# Patient Record
Sex: FEMALE | Race: BLACK OR AFRICAN AMERICAN | NOT HISPANIC OR LATINO | URBAN - METROPOLITAN AREA
[De-identification: names, ages, dates, MRNs, and addresses within clinical notes are randomized per-mention and may not be internally consistent; named-entity substitution may affect disease eponyms.]

---

## 2022-11-30 ENCOUNTER — INPATIENT (INPATIENT)
Facility: HOSPITAL | Age: 77
LOS: 47 days | Discharge: HOME | DRG: 884 | End: 2023-01-17
Attending: PSYCHIATRY & NEUROLOGY | Admitting: PSYCHIATRY & NEUROLOGY
Payer: MEDICARE

## 2022-11-30 VITALS
WEIGHT: 119.05 LBS | RESPIRATION RATE: 18 BRPM | HEIGHT: 62 IN | DIASTOLIC BLOOD PRESSURE: 74 MMHG | OXYGEN SATURATION: 99 % | HEART RATE: 64 BPM | SYSTOLIC BLOOD PRESSURE: 160 MMHG | TEMPERATURE: 97 F

## 2022-11-30 LAB
ALBUMIN SERPL ELPH-MCNC: 4 G/DL — SIGNIFICANT CHANGE UP (ref 3.3–5)
ALP SERPL-CCNC: 64 U/L — SIGNIFICANT CHANGE UP (ref 40–120)
ALT FLD-CCNC: 25 U/L — SIGNIFICANT CHANGE UP (ref 10–45)
AMPHET UR-MCNC: NEGATIVE — SIGNIFICANT CHANGE UP
ANION GAP SERPL CALC-SCNC: 7 MMOL/L — SIGNIFICANT CHANGE UP (ref 5–17)
APAP SERPL-MCNC: <5 UG/ML — LOW (ref 10–30)
APPEARANCE UR: CLEAR — SIGNIFICANT CHANGE UP
AST SERPL-CCNC: 42 U/L — HIGH (ref 10–40)
BACTERIA # UR AUTO: PRESENT /HPF
BARBITURATES UR SCN-MCNC: NEGATIVE — SIGNIFICANT CHANGE UP
BASOPHILS # BLD AUTO: 0.03 K/UL — SIGNIFICANT CHANGE UP (ref 0–0.2)
BASOPHILS NFR BLD AUTO: 0.8 % — SIGNIFICANT CHANGE UP (ref 0–2)
BENZODIAZ UR-MCNC: NEGATIVE — SIGNIFICANT CHANGE UP
BILIRUB SERPL-MCNC: 0.8 MG/DL — SIGNIFICANT CHANGE UP (ref 0.2–1.2)
BILIRUB UR-MCNC: NEGATIVE — SIGNIFICANT CHANGE UP
BUN SERPL-MCNC: 13 MG/DL — SIGNIFICANT CHANGE UP (ref 7–23)
CALCIUM SERPL-MCNC: 9.2 MG/DL — SIGNIFICANT CHANGE UP (ref 8.4–10.5)
CHLORIDE SERPL-SCNC: 109 MMOL/L — HIGH (ref 96–108)
CO2 SERPL-SCNC: 28 MMOL/L — SIGNIFICANT CHANGE UP (ref 22–31)
COCAINE METAB.OTHER UR-MCNC: NEGATIVE — SIGNIFICANT CHANGE UP
COLOR SPEC: YELLOW — SIGNIFICANT CHANGE UP
COMMENT - URINE: SIGNIFICANT CHANGE UP
CREAT SERPL-MCNC: 1.05 MG/DL — SIGNIFICANT CHANGE UP (ref 0.5–1.3)
DIFF PNL FLD: NEGATIVE — SIGNIFICANT CHANGE UP
EGFR: 55 ML/MIN/1.73M2 — LOW
EOSINOPHIL # BLD AUTO: 0.03 K/UL — SIGNIFICANT CHANGE UP (ref 0–0.5)
EOSINOPHIL NFR BLD AUTO: 0.8 % — SIGNIFICANT CHANGE UP (ref 0–6)
EPI CELLS # UR: SIGNIFICANT CHANGE UP /HPF (ref 0–5)
ETHANOL SERPL-MCNC: <10 MG/DL — SIGNIFICANT CHANGE UP (ref 0–10)
GLUCOSE SERPL-MCNC: 72 MG/DL — SIGNIFICANT CHANGE UP (ref 70–99)
GLUCOSE UR QL: NEGATIVE — SIGNIFICANT CHANGE UP
HCT VFR BLD CALC: 34.8 % — SIGNIFICANT CHANGE UP (ref 34.5–45)
HGB BLD-MCNC: 11.1 G/DL — LOW (ref 11.5–15.5)
IMM GRANULOCYTES NFR BLD AUTO: 0.3 % — SIGNIFICANT CHANGE UP (ref 0–0.9)
KETONES UR-MCNC: ABNORMAL MG/DL
LEUKOCYTE ESTERASE UR-ACNC: ABNORMAL
LYMPHOCYTES # BLD AUTO: 1.39 K/UL — SIGNIFICANT CHANGE UP (ref 1–3.3)
LYMPHOCYTES # BLD AUTO: 38.4 % — SIGNIFICANT CHANGE UP (ref 13–44)
MCHC RBC-ENTMCNC: 28 PG — SIGNIFICANT CHANGE UP (ref 27–34)
MCHC RBC-ENTMCNC: 31.9 GM/DL — LOW (ref 32–36)
MCV RBC AUTO: 87.7 FL — SIGNIFICANT CHANGE UP (ref 80–100)
METHADONE UR-MCNC: NEGATIVE — SIGNIFICANT CHANGE UP
MONOCYTES # BLD AUTO: 0.4 K/UL — SIGNIFICANT CHANGE UP (ref 0–0.9)
MONOCYTES NFR BLD AUTO: 11 % — SIGNIFICANT CHANGE UP (ref 2–14)
NEUTROPHILS # BLD AUTO: 1.76 K/UL — LOW (ref 1.8–7.4)
NEUTROPHILS NFR BLD AUTO: 48.7 % — SIGNIFICANT CHANGE UP (ref 43–77)
NITRITE UR-MCNC: NEGATIVE — SIGNIFICANT CHANGE UP
NRBC # BLD: 0 /100 WBCS — SIGNIFICANT CHANGE UP (ref 0–0)
NT-PROBNP SERPL-SCNC: 162 PG/ML — SIGNIFICANT CHANGE UP (ref 0–300)
OPIATES UR-MCNC: NEGATIVE — SIGNIFICANT CHANGE UP
PCP SPEC-MCNC: SIGNIFICANT CHANGE UP
PCP UR-MCNC: NEGATIVE — SIGNIFICANT CHANGE UP
PH UR: 6 — SIGNIFICANT CHANGE UP (ref 5–8)
PLATELET # BLD AUTO: 220 K/UL — SIGNIFICANT CHANGE UP (ref 150–400)
POTASSIUM SERPL-MCNC: 3.3 MMOL/L — LOW (ref 3.5–5.3)
POTASSIUM SERPL-SCNC: 3.3 MMOL/L — LOW (ref 3.5–5.3)
PROT SERPL-MCNC: 6.4 G/DL — SIGNIFICANT CHANGE UP (ref 6–8.3)
PROT UR-MCNC: NEGATIVE MG/DL — SIGNIFICANT CHANGE UP
RBC # BLD: 3.97 M/UL — SIGNIFICANT CHANGE UP (ref 3.8–5.2)
RBC # FLD: 13.1 % — SIGNIFICANT CHANGE UP (ref 10.3–14.5)
RBC CASTS # UR COMP ASSIST: < 5 /HPF — SIGNIFICANT CHANGE UP
SALICYLATES SERPL-MCNC: <0.3 MG/DL — LOW (ref 2.8–20)
SARS-COV-2 RNA SPEC QL NAA+PROBE: NEGATIVE — SIGNIFICANT CHANGE UP
SODIUM SERPL-SCNC: 144 MMOL/L — SIGNIFICANT CHANGE UP (ref 135–145)
SP GR SPEC: >=1.03 — SIGNIFICANT CHANGE UP (ref 1–1.03)
THC UR QL: NEGATIVE — SIGNIFICANT CHANGE UP
TROPONIN T SERPL-MCNC: 0.01 NG/ML — SIGNIFICANT CHANGE UP (ref 0–0.01)
UROBILINOGEN FLD QL: 1 E.U./DL — SIGNIFICANT CHANGE UP
WBC # BLD: 3.62 K/UL — LOW (ref 3.8–10.5)
WBC # FLD AUTO: 3.62 K/UL — LOW (ref 3.8–10.5)
WBC UR QL: ABNORMAL /HPF

## 2022-11-30 PROCEDURE — 71046 X-RAY EXAM CHEST 2 VIEWS: CPT | Mod: 26

## 2022-11-30 PROCEDURE — 93010 ELECTROCARDIOGRAM REPORT: CPT

## 2022-11-30 PROCEDURE — 99285 EMERGENCY DEPT VISIT HI MDM: CPT | Mod: FS

## 2022-11-30 PROCEDURE — 90792 PSYCH DIAG EVAL W/MED SRVCS: CPT

## 2022-11-30 RX ORDER — TRAZODONE HCL 50 MG
50 TABLET ORAL AT BEDTIME
Refills: 0 | Status: DISCONTINUED | OUTPATIENT
Start: 2022-11-30 | End: 2023-01-17

## 2022-11-30 RX ORDER — OLANZAPINE 15 MG/1
2.5 TABLET, FILM COATED ORAL EVERY 8 HOURS
Refills: 0 | Status: DISCONTINUED | OUTPATIENT
Start: 2022-11-30 | End: 2023-01-17

## 2022-11-30 RX ORDER — ACETAMINOPHEN 500 MG
650 TABLET ORAL EVERY 6 HOURS
Refills: 0 | Status: DISCONTINUED | OUTPATIENT
Start: 2022-11-30 | End: 2023-01-17

## 2022-11-30 RX ORDER — MAGNESIUM HYDROXIDE 400 MG/1
30 TABLET, CHEWABLE ORAL DAILY
Refills: 0 | Status: DISCONTINUED | OUTPATIENT
Start: 2022-11-30 | End: 2023-01-17

## 2022-11-30 RX ORDER — RISPERIDONE 4 MG/1
0.5 TABLET ORAL AT BEDTIME
Refills: 0 | Status: DISCONTINUED | OUTPATIENT
Start: 2022-11-30 | End: 2022-12-04

## 2022-11-30 RX ORDER — POTASSIUM CHLORIDE 20 MEQ
40 PACKET (EA) ORAL ONCE
Refills: 0 | Status: COMPLETED | OUTPATIENT
Start: 2022-11-30 | End: 2022-11-30

## 2022-11-30 RX ADMIN — Medication 40 MILLIEQUIVALENT(S): at 06:45

## 2022-11-30 NOTE — ED ADULT NURSE NOTE - NSIMPLEMENTINTERV_GEN_ALL_ED
Implemented All Universal Safety Interventions:  [place] to call system. Call bell, personal items and telephone within reach. Instruct patient to call for assistance. Room bathroom lighting operational. Non-slip footwear when patient is off stretcher. Physically safe environment: no spills, clutter or unnecessary equipment. Stretcher in lowest position, wheels locked, appropriate side rails in place.

## 2022-11-30 NOTE — ED ADULT TRIAGE NOTE - ARRIVAL INFO ADDITIONAL COMMENTS
Pt to ED c/o intromittent chest pain x4 months. Pt has hx of Dementia. A&OX3 at this time. Daughter at bedside. Seen at [facility] yesterday for same complaint.

## 2022-11-30 NOTE — ED ADULT NURSE REASSESSMENT NOTE - NS ED NURSE REASSESS COMMENT FT1
After psych consult, patient placed on constant observation d/t involuntary admission. [facility]. Patient undressed, wanded, valuables secured. Patient maintained in a safe environment. Pt given safe food tray. After psych consult, patient placed on constant observation d/t involuntary admission. Pt pending [facility]. Patient undressed and valuables secured. Patient maintained in a safe environment. Pt given safe food tray.
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
## 2022-11-30 NOTE — BH PATIENT PROFILE - NSPROPTRIGHTCAREGIVER_GEN_A_NUR
January 10, 2020      Fabio Bruno MD  [address]  [address]  [facility] [address]           [facility], operated by [facility] [address]  [facility], [address] [address]  Phone: [phone]  Fax: [phone]          Patient: Kimi Cadena   MR Number: [id number]   YOB: 1956   Date of Visit: 1/10/2020       Dear Dr. Fabio Bruno:    Thank you for referring Kimi Cadena to me for evaluation. Attached you will find relevant portions of my assessment and plan of care.    If you have questions, please do not hesitate to call me. I look forward to following Kimi Cadena along with you.    Sincerely,    [employer], GAVIN    Enclosure  CC:  No Recipients    If you would like to receive this communication electronically, please contact [email or web address] or [phone] to request more information on [employer] access.    For providers and/or their staff who would like to refer a patient to [facility], please contact us through our one-stop-shop provider referral line, [facility], at [phone].    If you feel you have received this communication in error or would no longer like to receive these types of communications, please e-mail [email or web address]          declines

## 2022-11-30 NOTE — BH CONSULTATION LIAISON ASSESSMENT NOTE - DESCRIPTION
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 She has been staying with her daughter for the past 2 months after being d/c from [facility] psychiatric unit. Her  passed away ~4-5 years ago but sustains herself with financial compensation after his death. She has 2 daughters, oldest one lives in [place]. She believes that her oldest daughter is obsessed with her youngest daughter, since she can't have children and the youngest one does. She has 8 other siblings, 2 sisters have refused to take her in for care.

## 2022-11-30 NOTE — BH PATIENT PROFILE - FALL HARM RISK - UNIVERSAL INTERVENTIONS
Bed in lowest position, wheels locked, appropriate side rails in place/Call bell, personal items and telephone in reach/Instruct patient to call for assistance before getting out of bed or chair/Non-slip footwear when patient is out of bed/[place] to call system/Physically safe environment - no spills, clutter or unnecessary equipment/Purposeful Proactive Rounding/Room/bathroom lighting operational, light cord in reach

## 2022-11-30 NOTE — BH CONSULTATION LIAISON ASSESSMENT NOTE - NSBHATTESTCOMMENTATTENDFT_PSY_A_CORE
Patient is a 76 y/o woman, with no PMH and PPH of one prior psychiatric admission for paranoid delusions, 2 months ago at [facility], BIB by her daughter after missing from home for 3 days (was called by [facility] last night), for worsening paranoid delusions, irritability, agitation and abusing behavior.  Per pt's daughter, pt symptoms have been worsening since her discharge from inpatient psych (patient refused to take the risperidone prescribed). Upon assessment today, pt presents calm and cooperative, with disorganized thought process and delusions about being stalked and having her belongings stollen. Patient presents AAO to person, place (hospital), month, year, day of the week. She presents with normal registration and impaired recall at 3 and 5 min (2/3). Patient's delusional symptoms are currently causing significant impact in her ability to care for herself and she requires inpatient psychiatric admission for stabilization and safety.  Plan:  -patient would benefit from psychiatric admission, currently meeting criteria for involuntary admission  -restart risperidone at 0.5mg po qhs  - consult to facilitate access to inpatient [facility]

## 2022-11-30 NOTE — ED PROVIDER NOTE - PROGRESS NOTE DETAILS
kelsey -   mild hypokalemia to 3.3 - given oral repletion.  otherwise chest pain work-up unremarkable. ecg nonischemic, cxr ok, trop negative.   tox labs ok.   UA pending   medically cleared. psych consulted. to be seen by day psych  signed out to day team pending psych eval, pt ultimately will need admission as daughter does not feel safe caring for her at home. psych eval pt, for involuntary admit.  one to one order placed.  SW aware and will work on finding [facility] bed d/w psych and bed available on 8uris

## 2022-11-30 NOTE — ED ADULT NURSE NOTE - OBJECTIVE STATEMENT
77F/ ambulates to ED c/o on and off CP x 4 mos, accompanied by daughter . Phx of multiple visits  to [facility] ED for similar complaints as per patient. Patient denies sob, dizziness, n/v , fever.

## 2022-11-30 NOTE — ED PROVIDER NOTE - OBJECTIVE STATEMENT
77 yr old female, no medical hx, history of paranoid delusional psychosis, presents to the Emergency Department for multiple issues.   majority of history comes from daughter at bedside.   per daughter pt previously lived in [place]. recently had 2 month admission to geriatric psych reveles at [facility] for paranoid delusional psychosis. is supposed to take risperidone - was mostly refusing during admission and has refused to take is since being out of the hospital. since that admission pt now lives in [place] with daughter. has had intermittent issues w agitation / anger. frequently complains of chest pain and has been seen at multiple ERs for the chest pain with negative work-ups. pt left the home ?saturday morning and had been missing since that time. was found at [facility] yesterday. was evaluated there and deemed medically stable. tonight pt became agitated again and again complained of chest pain and wanted to go to the hospital.   daughter feels as though especially after recent episde of leaving the house on her own she can no longer take care of her properly at home. feels she needs to be admitted back for management of psychosis or go to a rehab facility. of note daughter reports pt is normally calm and cooperative at hospitals - seems to feel safe there. gets agitated and paranoid when at home.  pt current denies complaints. states she had chest pain earlier but it went away on its own.

## 2022-11-30 NOTE — ED PROVIDER NOTE - PHYSICAL EXAMINATION
Constitutional : Well appearing, non-toxic, no acute distress. calm and cooperative. awake, alert, oriented to person, place, time/situation.  Head : head normocephalic, atraumatic  EENMT : eyes clear bilaterally, PERRL, EOMI. airway patent. moist mucous membranes. neck supple.  Cardiac : Normal rate, regular rhythm. No murmur appreciated, no LE edema.  Resp : Breath sounds clear and equal bilaterally. Respirations even and unlabored.   Gastro : abdomen soft, nontender, nondistended. no rebound or guarding.  MSK :  range of motion is not limited, no muscle or joint tenderness  Back : No evidence of trauma. No spinal or CVA tenderness.  Vasc : Extremities warm and well perfused. 2+ radial and DP pulses. cap refill <2 seconds  Neuro : Alert and oriented, CNII-XII grossly intact, no focal deficits, no motor or sensory deficits.  Skin : Skin normal color for race, warm, dry and intact. No evidence of rash.  Psych : Alert and oriented to person, place, time/situation. normal mood and affect. no apparent risk to self or others.

## 2022-11-30 NOTE — ED PROVIDER NOTE - CLINICAL SUMMARY MEDICAL DECISION MAKING FREE TEXT BOX
pt w hx of paranoid delusional psychosis (not on meds). recent 2 month psych hospitalization. here w daughter. recently wandered from home and missing for two days. found yesterday, medically cleared at [facility] but daughter does not feel she can care for her at home anymore. pt wanted to come to hospital for chest pain - now resolved. per daughter ongoing x months. evaluated at multiple hospitals for same  pt well / nontoxic appearing, stable vitals. exam unremarkable. calm / cooperative answering questions appropriately   low suspicion acs given multiple work-ups in past.   will get labs, ecg, cxr. if negative cleared medically.   otherwise concern for safety at home and daughter doesn't feel she can care for her.  will consult psych given recent psych admission as symptoms dx paranoid delusional psychosis.   pt will ultimately need admission psych vs medicine for facility / rehab placement

## 2022-11-30 NOTE — ED PROVIDER NOTE - NS ED ATTENDING STATEMENT MOD
This was a shared visit with the JEREMY. I reviewed and verified the documentation and independently performed the documented:

## 2022-11-30 NOTE — BH CONSULTATION LIAISON ASSESSMENT NOTE - RISK ASSESSMENT
low risk of suicidality : patient does not have prior history of self harm, currently denies SI; she has a good support system, she demonstrates future oriented thinking.

## 2022-11-30 NOTE — BH CONSULTATION LIAISON ASSESSMENT NOTE - NSBHCHARTREVIEWVS_PSY_A_CORE FT
Vital Signs Last 24 Hrs  T(C): 36.6 (30 Nov 2022 06:45), Max: 36.6 (30 Nov 2022 06:45)  T(F): 97.9 (30 Nov 2022 06:45), Max: 97.9 (30 Nov 2022 06:45)  HR: 62 (30 Nov 2022 06:45) (62 - 64)  BP: 146/73 (30 Nov 2022 06:45) (146/73 - 160/74)  BP(mean): --  RR: 16 (30 Nov 2022 06:45) (16 - 18)  SpO2: 98% (30 Nov 2022 06:45) (98% - 99%)    Parameters below as of 30 Nov 2022 06:45  Patient On (Oxygen Delivery Method): room air

## 2022-12-01 LAB
A1C WITH ESTIMATED AVERAGE GLUCOSE RESULT: 5.4 % — SIGNIFICANT CHANGE UP (ref 4–5.6)
CHOLEST SERPL-MCNC: 168 MG/DL — SIGNIFICANT CHANGE UP
ESTIMATED AVERAGE GLUCOSE: 108 MG/DL — SIGNIFICANT CHANGE UP (ref 68–114)
HDLC SERPL-MCNC: 89 MG/DL — SIGNIFICANT CHANGE UP
LIPID PNL WITH DIRECT LDL SERPL: 73 MG/DL — SIGNIFICANT CHANGE UP
NON HDL CHOLESTEROL: 79 MG/DL — SIGNIFICANT CHANGE UP
TRIGL SERPL-MCNC: 29 MG/DL — SIGNIFICANT CHANGE UP

## 2022-12-01 PROCEDURE — 70450 CT HEAD/BRAIN W/O DYE: CPT | Mod: 26

## 2022-12-01 PROCEDURE — 99223 1ST HOSP IP/OBS HIGH 75: CPT

## 2022-12-01 RX ADMIN — RISPERIDONE 0.5 MILLIGRAM(S): 4 TABLET ORAL at 22:00

## 2022-12-01 RX ADMIN — Medication 650 MILLIGRAM(S): at 17:58

## 2022-12-01 NOTE — BH INPATIENT PSYCHIATRY ASSESSMENT NOTE - NSTXDISORGGOALOTHER_PSY_ALL_CORE
Patient will stabilize mood & alleviate sx of disorganized thoughts/behaviors to engage with discharge planning.
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
## 2022-12-01 NOTE — BH INPATIENT PSYCHIATRY ASSESSMENT NOTE - RISK ASSESSMENT
Static: mental illness, family hx of mental illness, age  Modifiable: Current mood/psychotic episode, insight/judgment, access to treatment  Protective: Involved family, physically healthy, future oriented

## 2022-12-01 NOTE — BH INPATIENT PSYCHIATRY ASSESSMENT NOTE - CURRENT MEDICATION
MEDICATIONS  (STANDING):  risperiDONE   Tablet 0.5 milliGRAM(s) Oral at bedtime    MEDICATIONS  (PRN):  acetaminophen     Tablet .. 650 milliGRAM(s) Oral every 6 hours PRN Moderate Pain (4 - 6)  aluminum hydroxide/magnesium hydroxide/simethicone Suspension 30 milliLiter(s) Oral every 4 hours PRN Dyspepsia  magnesium hydroxide Suspension 30 milliLiter(s) Oral daily PRN Constipation  OLANZapine 2.5 milliGRAM(s) Oral every 8 hours PRN agitation  traZODone 50 milliGRAM(s) Oral at bedtime PRN insomnia   MEDICATIONS  (STANDING):  multivitamin 1 Tablet(s) Oral daily  risperiDONE   Tablet 0.5 milliGRAM(s) Oral at bedtime    MEDICATIONS  (PRN):  acetaminophen     Tablet .. 650 milliGRAM(s) Oral every 6 hours PRN Moderate Pain (4 - 6)  aluminum hydroxide/magnesium hydroxide/simethicone Suspension 30 milliLiter(s) Oral every 4 hours PRN Dyspepsia  magnesium hydroxide Suspension 30 milliLiter(s) Oral daily PRN Constipation  OLANZapine 2.5 milliGRAM(s) Oral every 8 hours PRN agitation  traZODone 50 milliGRAM(s) Oral at bedtime PRN insomnia

## 2022-12-01 NOTE — BH SOCIAL WORK INITIAL PSYCHOSOCIAL EVALUATION - DETAILS
Per ED report: Per daughter, Schizophrenia runs heavily in the family, having affected 2 brothers, father and grandfather.

## 2022-12-01 NOTE — BH INPATIENT PSYCHIATRY ASSESSMENT NOTE - NSBHATTESTAPPBILLTIME_PSY_A_CORE
I attest my time as JEREMY is greater than 50% of the total combined time spent on qualifying patient care activities. I have reviewed and verified the documentation.

## 2022-12-01 NOTE — BH INPATIENT PSYCHIATRY ASSESSMENT NOTE - NSBHMSEINSIGHT_PSY_A_CORE
Poor Posterior Auricular Interpolation Flap Text: A decision was made to reconstruct the defect utilizing an interpolation axial flap and a staged reconstruction.  A telfa template was made of the defect.  This telfa template was then used to outline the posterior auricular interpolation flap.  The donor area for the pedicle flap was then injected with anesthesia.  The flap was excised through the skin and subcutaneous tissue down to the layer of the underlying musculature.  The pedicle flap was carefully excised within this deep plane to maintain its blood supply.  The edges of the donor site were undermined.   The donor site was closed in a primary fashion.  The pedicle was then rotated into position and sutured.  Once the tube was sutured into place, adequate blood supply was confirmed with blanching and refill.  The pedicle was then wrapped with xeroform gauze and dressed appropriately with a telfa and gauze bandage to ensure continued blood supply and protect the attached pedicle.

## 2022-12-01 NOTE — BH SOCIAL WORK INITIAL PSYCHOSOCIAL EVALUATION - NSHIGHRISKBEHFT_PSY_ALL_CORE
Per ED report: Per daughter, pt left Saturday morning and was missing for 3-4 days before being found in [facility] yesterday.

## 2022-12-01 NOTE — BH INPATIENT PSYCHIATRY ASSESSMENT NOTE - NSBHMETABOLIC_PSY_ALL_CORE_FT
BMI: BMI (kg/m2): 21.8 (11-30-22 @ 03:08)  HbA1c: A1C with Estimated Average Glucose Result: 5.4 % (12-01-22 @ 07:21)    Glucose:   BP: 142/77 (12-01-22 @ 08:45) (142/77 - 163/65)  Lipid Panel: Date/Time: 12-01-22 @ 07:21  Cholesterol, Serum: 168  Direct LDL: --  HDL Cholesterol, Serum: 89  Total Cholesterol/HDL Ration Measurement: --  Triglycerides, Serum: 29   BMI: BMI (kg/m2): 21.8 (11-30-22 @ 03:08)  HbA1c: A1C with Estimated Average Glucose Result: 5.4 % (12-01-22 @ 07:21)    Glucose:   BP: 146/61 (12-01-22 @ 16:48) (142/77 - 163/65)  Lipid Panel: Date/Time: 12-01-22 @ 07:21  Cholesterol, Serum: 168  Direct LDL: --  HDL Cholesterol, Serum: 89  Total Cholesterol/HDL Ration Measurement: --  Triglycerides, Serum: 29   BMI: BMI (kg/m2): 21.8 (11-30-22 @ 03:08)  HbA1c: A1C with Estimated Average Glucose Result: 5.4 % (12-01-22 @ 07:21)    Glucose:   BP: 131/60 (12-02-22 @ 10:02) (131/60 - 163/65)  Lipid Panel: Date/Time: 12-01-22 @ 07:21  Cholesterol, Serum: 168  Direct LDL: --  HDL Cholesterol, Serum: 89  Total Cholesterol/HDL Ration Measurement: --  Triglycerides, Serum: 29
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
## 2022-12-01 NOTE — BH INPATIENT PSYCHIATRY ASSESSMENT NOTE - DESCRIPTION
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 She has been staying with her daughter for the past 2 months after being d/c from [facility] psychiatric unit. Her  passed away ~4-5 years ago but sustains herself with financial compensation after his death. She has 2 daughters, oldest one [facility]. She believes that her oldest daughter is obsessed with her youngest daughter, since she can't have children and the youngest one does. She has 8 other siblings, 2 sisters have refused to take her in for care.

## 2022-12-01 NOTE — BH INPATIENT PSYCHIATRY ASSESSMENT NOTE - NSBHCHARTREVIEWVS_PSY_A_CORE FT
Vital Signs Last 24 Hrs  T(C): 36.9 (11-30-22 @ 18:32), Max: 36.9 (11-30-22 @ 18:32)  T(F): 98.5 (11-30-22 @ 18:32), Max: 98.5 (11-30-22 @ 18:32)  HR: 54 (12-01-22 @ 08:45) (54 - 66)  BP: 142/77 (12-01-22 @ 08:45) (142/77 - 163/65)  BP(mean): --  RR: 18 (12-01-22 @ 08:45) (16 - 18)  SpO2: 97% (12-01-22 @ 08:45) (97% - 100%)     Vital Signs Last 24 Hrs  T(C): 36.6 (12-01-22 @ 16:48), Max: 36.6 (12-01-22 @ 16:48)  T(F): 97.8 (12-01-22 @ 16:48), Max: 97.8 (12-01-22 @ 16:48)  HR: 65 (12-01-22 @ 16:48) (54 - 65)  BP: 146/61 (12-01-22 @ 16:48) (142/77 - 146/61)  BP(mean): --  RR: 18 (12-01-22 @ 16:48) (18 - 18)  SpO2: 100% (12-01-22 @ 16:48) (97% - 100%)     Vital Signs Last 24 Hrs  T(C): 36.5 (12-02-22 @ 10:02), Max: 36.6 (12-01-22 @ 16:48)  T(F): 97.7 (12-02-22 @ 10:02), Max: 97.8 (12-01-22 @ 16:48)  HR: 55 (12-02-22 @ 10:02) (55 - 65)  BP: 131/60 (12-02-22 @ 10:02) (131/60 - 146/61)  BP(mean): --  RR: 18 (12-02-22 @ 10:02) (18 - 18)  SpO2: 99% (12-02-22 @ 10:02) (99% - 100%)
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
## 2022-12-01 NOTE — BH SOCIAL WORK INITIAL PSYCHOSOCIAL EVALUATION - NSBHHOUSECOMMENTFT_PSY_ALL_CORE
Pt reports that she is currently residing with her daughter, Tuessusanne, and her son-in-law, Solitario Muller. Per chart, address is: [address] [place] [address] [id number], [place] [address]. However, pt wishes to return to her home in [place].
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
## 2022-12-02 DIAGNOSIS — F22 DELUSIONAL DISORDERS: ICD-10-CM

## 2022-12-02 LAB
APPEARANCE UR: CLEAR — SIGNIFICANT CHANGE UP
BILIRUB UR-MCNC: NEGATIVE — SIGNIFICANT CHANGE UP
COLOR SPEC: YELLOW — SIGNIFICANT CHANGE UP
DIFF PNL FLD: NEGATIVE — SIGNIFICANT CHANGE UP
GLUCOSE UR QL: NEGATIVE — SIGNIFICANT CHANGE UP
HIV 1+2 AB+HIV1 P24 AG SERPL QL IA: SIGNIFICANT CHANGE UP
KETONES UR-MCNC: NEGATIVE — SIGNIFICANT CHANGE UP
LEUKOCYTE ESTERASE UR-ACNC: NEGATIVE — SIGNIFICANT CHANGE UP
NITRITE UR-MCNC: NEGATIVE — SIGNIFICANT CHANGE UP
PH UR: 6 — SIGNIFICANT CHANGE UP (ref 5–8)
PROT UR-MCNC: NEGATIVE MG/DL — SIGNIFICANT CHANGE UP
SP GR SPEC: 1.02 — SIGNIFICANT CHANGE UP (ref 1–1.03)
TSH SERPL-MCNC: 1.38 UIU/ML — SIGNIFICANT CHANGE UP (ref 0.27–4.2)
UROBILINOGEN FLD QL: 0.2 E.U./DL — SIGNIFICANT CHANGE UP

## 2022-12-02 PROCEDURE — 93010 ELECTROCARDIOGRAM REPORT: CPT

## 2022-12-02 PROCEDURE — 99233 SBSQ HOSP IP/OBS HIGH 50: CPT

## 2022-12-02 RX ADMIN — Medication 1 TABLET(S): at 10:58

## 2022-12-02 NOTE — BH INPATIENT PSYCHIATRY PROGRESS NOTE - NSBHMETABOLIC_PSY_ALL_CORE_FT
BMI: BMI (kg/m2): 21.8 (11-30-22 @ 03:08)  HbA1c: A1C with Estimated Average Glucose Result: 5.4 % (12-01-22 @ 07:21)    Glucose:   BP: 131/60 (12-02-22 @ 10:02) (131/60 - 163/65)  Lipid Panel: Date/Time: 12-01-22 @ 07:21  Cholesterol, Serum: 168  Direct LDL: --  HDL Cholesterol, Serum: 89  Total Cholesterol/HDL Ration Measurement: --  Triglycerides, Serum: 29   BMI: BMI (kg/m2): 21.8 (11-30-22 @ 03:08)  HbA1c: A1C with Estimated Average Glucose Result: 5.4 % (12-01-22 @ 07:21)    Glucose:   BP: 131/60 (12-02-22 @ 10:02) (131/60 - 163/65)  Lipid Panel: Date/Time: 12-01-22 @ 07:21  Cholesterol, Serum: 168  Direct LDL: --  HDL Cholesterol, Serum: 89  Total Cholesterol/HDL Ration Measurement: --  Triglycerides, Serum: 29    ACC: [id number] EXAM:  CT BRAIN                          PROCEDURE DATE:  12/01/2022          INTERPRETATION:  PROCEDURE: CT head without intravenous contrast    INDICATION: Acute onset of psychosis    TECHNIQUE: Multiple axial images were obtained at 5 mm intervals from the   skull base to the vertex. Sagittal and coronal reformatted images were   obtained from the axial data set. The images were reviewed in brain and   bone windows.    COMPARISON: None    FINDINGS: The CT examination demonstrates is limited secondary to to   extensive streak artifact from multiple overlying metallic objects on the   patient's head. The ventricles, cisternal spaces, and cortical sulci   grossly appear within normal limits. There is no midline shift or extra   axial collections. The gray white differentiation appears within normal   limits. There is no intracranial hemorrhage or acute transcortical   infarct. There is extensive hypodensity within the periventricular white   matter which is nonspecific and may represent the sequela of small vessel   ischemic disease in this patient.    The bony windows demonstrates no fractures. The visualized paranasal   sinuses are within normal limits. The mastoid air cells are well aerated.    IMPRESSION: Limited exam secondary to extensive streak artifact. No   definite intracranial hemorrhage or acute transcortical infarct.   Extensive small vessel ischemic disease.    --- End of Report ---

## 2022-12-02 NOTE — BH INPATIENT PSYCHIATRY PROGRESS NOTE - CURRENT MEDICATION
MEDICATIONS  (STANDING):  multivitamin 1 Tablet(s) Oral daily  risperiDONE   Tablet 0.5 milliGRAM(s) Oral at bedtime    MEDICATIONS  (PRN):  acetaminophen     Tablet .. 650 milliGRAM(s) Oral every 6 hours PRN Moderate Pain (4 - 6)  aluminum hydroxide/magnesium hydroxide/simethicone Suspension 30 milliLiter(s) Oral every 4 hours PRN Dyspepsia  magnesium hydroxide Suspension 30 milliLiter(s) Oral daily PRN Constipation  OLANZapine 2.5 milliGRAM(s) Oral every 8 hours PRN agitation  traZODone 50 milliGRAM(s) Oral at bedtime PRN insomnia

## 2022-12-02 NOTE — BH INPATIENT PSYCHIATRY PROGRESS NOTE - NSBHCHARTREVIEWVS_PSY_A_CORE FT
Vital Signs Last 24 Hrs  T(C): 36.5 (12-02-22 @ 10:02), Max: 36.6 (12-01-22 @ 16:48)  T(F): 97.7 (12-02-22 @ 10:02), Max: 97.8 (12-01-22 @ 16:48)  HR: 55 (12-02-22 @ 10:02) (55 - 65)  BP: 131/60 (12-02-22 @ 10:02) (131/60 - 146/61)  BP(mean): --  RR: 18 (12-02-22 @ 10:02) (18 - 18)  SpO2: 99% (12-02-22 @ 10:02) (99% - 100%)
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
## 2022-12-03 LAB — FOLATE SERPL-MCNC: >20 NG/ML — SIGNIFICANT CHANGE UP

## 2022-12-03 PROCEDURE — 99232 SBSQ HOSP IP/OBS MODERATE 35: CPT

## 2022-12-03 RX ADMIN — RISPERIDONE 0.5 MILLIGRAM(S): 4 TABLET ORAL at 21:58

## 2022-12-03 NOTE — DIETITIAN INITIAL EVALUATION ADULT - NUTRITIONGOAL OUTCOME1
Pt to recall 2+ knowledge points from assessment and follow through with general healthy diet upon discharge.

## 2022-12-03 NOTE — DIETITIAN INITIAL EVALUATION ADULT - ADD RECOMMEND
1. Continue with Regular diet.   2. Monitor %PO intake, diet tolerance.   >>Encourage PO intake as appropriate   3. Monitor lytes, replete prn. Monitor BG.   4. Trend weekly wts. Monitor skin integrity, s/sx GI distress.   5. Pain/BM regimen per team   6. RD diet edu reinforcement prn.   7. RD to remain available for additional nutrition interventions as needed.

## 2022-12-03 NOTE — DIETITIAN INITIAL EVALUATION ADULT - NSFNSPHYEXAMSKINFT_GEN_A_CORE
Airway  Performed by: Trent Jennings MD  Authorized by: Trent Jennings MD     Final Airway Type:  Endotracheal airway  Final Endotracheal Airway*:  ETT  ETT Size (mm)*:  7.5  Cuff*:  Regular  Technique Used for Successful ETT Placement:  Direct laryngoscopy  Devices/Methods Used in Placement*:  Mask and Oral Airway  Intubation Procedure*:  Preoxygenation, ETCO2, Atraumatic, Dentition Unchanged and Phaynx Clear  Insertion Site:  Oral  Blade Type*:  MAC  Blade Size*:  3  Cuff Volume (mL):  8  Measured from*:  Gums  Secured at (cm)*:  23  Placement Verified by: auscultation and capnometry    Glottic View*:  2 - partial view of glottis  Attempts*:  1  Number of Other Approaches Attempted:  0   Patient Identified, Procedure confirmed, Emergency equipment available and Safety protocols followed  Location:  OR  Urgency:  Elective  Difficult Airway: No    Indications for Airway Management:  Anesthesia  Spontaneous Ventilation: absent    Sedation Level:  Anesthetized  Mask Difficulty Assessment:  2 - vent by mask + OA or adjuvant +/- NMBA  Performed By:  Anesthesiologist  Anesthesiologist:  Trent Jennings MD  Start Time: 4/5/2022 11:05 AM        
Celestino 22  no pressure injuries   no edema

## 2022-12-03 NOTE — BH INPATIENT PSYCHIATRY PROGRESS NOTE - NSBHMETABOLIC_PSY_ALL_CORE_FT
BMI: BMI (kg/m2): 21.8 (11-30-22 @ 03:08)  HbA1c: A1C with Estimated Average Glucose Result: 5.4 % (12-01-22 @ 07:21)    Glucose:   BP: 124/66 (12-02-22 @ 17:04) (124/66 - 163/65)  Lipid Panel: Date/Time: 12-01-22 @ 07:21  Cholesterol, Serum: 168  Direct LDL: --  HDL Cholesterol, Serum: 89  Total Cholesterol/HDL Ration Measurement: --  Triglycerides, Serum: 29
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
## 2022-12-03 NOTE — BH INPATIENT PSYCHIATRY PROGRESS NOTE - NSBHCHARTREVIEWVS_PSY_A_CORE FT
Vital Signs Last 24 Hrs  T(C): 36.4 (12-02-22 @ 17:04), Max: 36.4 (12-02-22 @ 17:04)  T(F): 97.5 (12-02-22 @ 17:04), Max: 97.5 (12-02-22 @ 17:04)  HR: 66 (12-02-22 @ 17:04) (66 - 66)  BP: 124/66 (12-02-22 @ 17:04) (124/66 - 124/66)  BP(mean): --  RR: 18 (12-02-22 @ 17:04) (18 - 18)  SpO2: 100% (12-02-22 @ 17:04) (100% - 100%)

## 2022-12-03 NOTE — DIETITIAN INITIAL EVALUATION ADULT - OTHER CALCULATIONS
%IBW; 108; ABW used to calculate estimated needs d/t pt being between 80 and 120%IBW. Adjusted for risk for PCM, clinical judgment.

## 2022-12-03 NOTE — BH INPATIENT PSYCHIATRY PROGRESS NOTE - NSBHASSESSSUMMFT_PSY_ALL_CORE
76 yo woman, , retired, domiciled alone in [facility] [place] [facility], denies psychiatric history, no known PMH, with symptoms of paranoia, recently T&R'd from [facility], now admitted to [facility] on 2[facility] for psychosis with prominent paranoid delusions. Of note, MMSE 22/30. 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    PLAN:  --Continue 2PC admission  --Risperdal 0.5mg qhs PO for psychosis, titrate as tolerated  --EKG with QTc wnl but with 1st degree AV block  --S/p head CT with extensive small vessel ischemic disease
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
## 2022-12-04 LAB
ANION GAP SERPL CALC-SCNC: 7 MMOL/L — SIGNIFICANT CHANGE UP (ref 5–17)
BUN SERPL-MCNC: 18 MG/DL — SIGNIFICANT CHANGE UP (ref 7–23)
CALCIUM SERPL-MCNC: 9.5 MG/DL — SIGNIFICANT CHANGE UP (ref 8.4–10.5)
CHLORIDE SERPL-SCNC: 108 MMOL/L — SIGNIFICANT CHANGE UP (ref 96–108)
CO2 SERPL-SCNC: 30 MMOL/L — SIGNIFICANT CHANGE UP (ref 22–31)
CREAT SERPL-MCNC: 1.01 MG/DL — SIGNIFICANT CHANGE UP (ref 0.5–1.3)
EGFR: 57 ML/MIN/1.73M2 — LOW
GLUCOSE SERPL-MCNC: 99 MG/DL — SIGNIFICANT CHANGE UP (ref 70–99)
MAGNESIUM SERPL-MCNC: 1.9 MG/DL — SIGNIFICANT CHANGE UP (ref 1.6–2.6)
PHOSPHATE SERPL-MCNC: 3.3 MG/DL — SIGNIFICANT CHANGE UP (ref 2.5–4.5)
POTASSIUM SERPL-MCNC: 4.2 MMOL/L — SIGNIFICANT CHANGE UP (ref 3.5–5.3)
POTASSIUM SERPL-SCNC: 4.2 MMOL/L — SIGNIFICANT CHANGE UP (ref 3.5–5.3)
SODIUM SERPL-SCNC: 145 MMOL/L — SIGNIFICANT CHANGE UP (ref 135–145)

## 2022-12-04 PROCEDURE — 99231 SBSQ HOSP IP/OBS SF/LOW 25: CPT

## 2022-12-04 RX ORDER — RISPERIDONE 4 MG/1
1 TABLET ORAL AT BEDTIME
Refills: 0 | Status: DISCONTINUED | OUTPATIENT
Start: 2022-12-04 | End: 2022-12-23

## 2022-12-04 RX ADMIN — Medication 1 TABLET(S): at 10:01

## 2022-12-04 NOTE — BH INPATIENT PSYCHIATRY PROGRESS NOTE - NSBHCHARTREVIEWVS_PSY_A_CORE FT
Vital Signs Last 24 Hrs  T(C): 36.6 (12-03-22 @ 16:00), Max: 36.6 (12-03-22 @ 16:00)  T(F): 97.9 (12-03-22 @ 16:00), Max: 97.9 (12-03-22 @ 16:00)  HR: 72 (12-03-22 @ 16:00) (72 - 72)  BP: 132/59 (12-03-22 @ 16:00) (132/59 - 132/59)  BP(mean): --  RR: 18 (12-03-22 @ 16:00) (18 - 18)  SpO2: 100% (12-03-22 @ 16:00) (100% - 100%)     Vital Signs Last 24 Hrs  T(C): 36.5 (12-04-22 @ 09:00), Max: 36.6 (12-03-22 @ 16:00)  T(F): 97.7 (12-04-22 @ 09:00), Max: 97.9 (12-03-22 @ 16:00)  HR: 63 (12-04-22 @ 09:00) (63 - 72)  BP: 130/83 (12-04-22 @ 09:00) (130/83 - 132/59)  BP(mean): --  RR: 17 (12-04-22 @ 09:00) (17 - 18)  SpO2: 100% (12-04-22 @ 09:00) (100% - 100%)

## 2022-12-04 NOTE — BH INPATIENT PSYCHIATRY PROGRESS NOTE - NSBHASSESSSUMMFT_PSY_ALL_CORE
76 yo woman, , retired, domiciled alone in [facility], denies psychiatric history, no known PMH, with symptoms of paranoia, recently T&R'd from [facility], now admitted to [facility] for psychosis with prominent paranoid delusions. Of note, MMSE 22/30. Overall impression includes neurocognitive disorder (likely component of vascular-related dementia with recent CTH findings of small vessel ischemic disease) with psychotic features, r/o primary psychotic disorder.    PLAN:  -2PC admit; frequent checks  -f/u repeat BMP, Mg, Phos (s/p potassium supplement with prior K+ 3.3)  -uptitrate risperidone to 1mg qhs  -re-eval if c/f MDD w psychotic features vs NCD with psychoses/behav disturbances; may benefit from SSRI + neuroleptic if hx c/w mood sx preceding psychoses  -repeat EKG, monitor QTc, chronic 1st degree AV block; pt asymptomatic 76 yo woman, , retired, domiciled alone in [facility], denies psychiatric history, no known PMH, with symptoms of paranoia, recently T&R'd from [facility], now admitted to [facility] for psychosis with prominent paranoid delusions. Of note, MMSE 22/30. Overall impression includes neurocognitive disorder (likely component of vascular-related dementia with recent CTH findings of small vessel ischemic disease) with psychotic features, r/o primary psychotic disorder.    PLAN:  -2PC admit; frequent checks  -f/u repeat BMP, Mg, Phos (s/p potassium supplement with prior K+ 3.3)--normalized  -uptitrate risperidone to 1mg qhs  -re-eval if c/f MDD w psychotic features vs NCD with psychoses/behav disturbances; may benefit from SSRI + neuroleptic if hx c/w mood sx preceding psychoses  -repeat EKG, monitor QTc, chronic 1st degree AV block; pt asymptomatic  -nutrition consult

## 2022-12-04 NOTE — BH INPATIENT PSYCHIATRY PROGRESS NOTE - CURRENT MEDICATION
MEDICATIONS  (STANDING):  multivitamin 1 Tablet(s) Oral daily  risperiDONE   Tablet 0.5 milliGRAM(s) Oral at bedtime    MEDICATIONS  (PRN):  acetaminophen     Tablet .. 650 milliGRAM(s) Oral every 6 hours PRN Moderate Pain (4 - 6)  aluminum hydroxide/magnesium hydroxide/simethicone Suspension 30 milliLiter(s) Oral every 4 hours PRN Dyspepsia  magnesium hydroxide Suspension 30 milliLiter(s) Oral daily PRN Constipation  OLANZapine 2.5 milliGRAM(s) Oral every 8 hours PRN agitation  traZODone 50 milliGRAM(s) Oral at bedtime PRN insomnia   MEDICATIONS  (STANDING):  multivitamin 1 Tablet(s) Oral daily  risperiDONE   Tablet 1 milliGRAM(s) Oral at bedtime    MEDICATIONS  (PRN):  acetaminophen     Tablet .. 650 milliGRAM(s) Oral every 6 hours PRN Moderate Pain (4 - 6)  aluminum hydroxide/magnesium hydroxide/simethicone Suspension 30 milliLiter(s) Oral every 4 hours PRN Dyspepsia  magnesium hydroxide Suspension 30 milliLiter(s) Oral daily PRN Constipation  OLANZapine 2.5 milliGRAM(s) Oral every 8 hours PRN agitation  traZODone 50 milliGRAM(s) Oral at bedtime PRN insomnia
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
## 2022-12-04 NOTE — BH INPATIENT PSYCHIATRY PROGRESS NOTE - NSBHMETABOLIC_PSY_ALL_CORE_FT
BMI: BMI (kg/m2): 21.8 (11-30-22 @ 03:08)  HbA1c: A1C with Estimated Average Glucose Result: 5.4 % (12-01-22 @ 07:21)    Glucose:   BP: 132/59 (12-03-22 @ 16:00) (124/66 - 146/61)  Lipid Panel: Date/Time: 12-01-22 @ 07:21  Cholesterol, Serum: 168  Direct LDL: --  HDL Cholesterol, Serum: 89  Total Cholesterol/HDL Ration Measurement: --  Triglycerides, Serum: 29   BMI: BMI (kg/m2): 21.8 (11-30-22 @ 03:08)  HbA1c: A1C with Estimated Average Glucose Result: 5.4 % (12-01-22 @ 07:21)    Glucose:   BP: 130/83 (12-04-22 @ 09:00) (124/66 - 146/61)  Lipid Panel: Date/Time: 12-01-22 @ 07:21  Cholesterol, Serum: 168  Direct LDL: --  HDL Cholesterol, Serum: 89  Total Cholesterol/HDL Ration Measurement: --  Triglycerides, Serum: 29

## 2022-12-05 PROCEDURE — 99233 SBSQ HOSP IP/OBS HIGH 50: CPT

## 2022-12-05 NOTE — BH INPATIENT PSYCHIATRY PROGRESS NOTE - NSBHCHARTREVIEWVS_PSY_A_CORE FT
Vital Signs Last 24 Hrs  T(C): 36.4 (12-05-22 @ 09:00), Max: 36.8 (12-04-22 @ 16:28)  T(F): 97.6 (12-05-22 @ 09:00), Max: 98.2 (12-04-22 @ 16:28)  HR: 58 (12-05-22 @ 09:00) (58 - 62)  BP: 148/71 (12-05-22 @ 09:00) (125/75 - 148/71)  BP(mean): --  RR: 18 (12-05-22 @ 09:00) (18 - 18)  SpO2: 97% (12-05-22 @ 09:00) (97% - 99%)     Vital Signs Last 24 Hrs  T(C): 36.4 (12-05-22 @ 09:00), Max: 36.4 (12-05-22 @ 09:00)  T(F): 97.6 (12-05-22 @ 09:00), Max: 97.6 (12-05-22 @ 09:00)  HR: 58 (12-05-22 @ 09:00) (58 - 58)  BP: 148/71 (12-05-22 @ 09:00) (148/71 - 148/71)  BP(mean): --  RR: 18 (12-05-22 @ 09:00) (18 - 18)  SpO2: 97% (12-05-22 @ 09:00) (97% - 97%)

## 2022-12-05 NOTE — BH INPATIENT PSYCHIATRY PROGRESS NOTE - NSBHMETABOLIC_PSY_ALL_CORE_FT
BMI: BMI (kg/m2): 21.8 (11-30-22 @ 03:08)  HbA1c: A1C with Estimated Average Glucose Result: 5.4 % (12-01-22 @ 07:21)    Glucose:   BP: 148/71 (12-05-22 @ 09:00) (124/66 - 148/71)  Lipid Panel: Date/Time: 12-01-22 @ 07:21  Cholesterol, Serum: 168  Direct LDL: --  HDL Cholesterol, Serum: 89  Total Cholesterol/HDL Ration Measurement: --  Triglycerides, Serum: 29
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
## 2022-12-05 NOTE — BH INPATIENT PSYCHIATRY PROGRESS NOTE - CURRENT MEDICATION
MEDICATIONS  (STANDING):  multivitamin 1 Tablet(s) Oral daily  risperiDONE   Tablet 1 milliGRAM(s) Oral at bedtime    MEDICATIONS  (PRN):  acetaminophen     Tablet .. 650 milliGRAM(s) Oral every 6 hours PRN Moderate Pain (4 - 6)  aluminum hydroxide/magnesium hydroxide/simethicone Suspension 30 milliLiter(s) Oral every 4 hours PRN Dyspepsia  magnesium hydroxide Suspension 30 milliLiter(s) Oral daily PRN Constipation  OLANZapine 2.5 milliGRAM(s) Oral every 8 hours PRN agitation  traZODone 50 milliGRAM(s) Oral at bedtime PRN insomnia
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
## 2022-12-06 PROCEDURE — 99233 SBSQ HOSP IP/OBS HIGH 50: CPT

## 2022-12-06 NOTE — BH INPATIENT PSYCHIATRY PROGRESS NOTE - NSBHCHARTREVIEWVS_PSY_A_CORE FT
Vital Signs Last 24 Hrs  T(C): 37.1 (12-06-22 @ 08:55), Max: 37.1 (12-06-22 @ 08:55)  T(F): 98.7 (12-06-22 @ 08:55), Max: 98.7 (12-06-22 @ 08:55)  HR: 97 (12-06-22 @ 08:55) (65 - 97)  BP: 187/72 (12-06-22 @ 08:55) (123/63 - 187/72)  BP(mean): --  RR: 18 (12-06-22 @ 08:55) (18 - 18)  SpO2: 100% (12-06-22 @ 08:55) (99% - 100%)

## 2022-12-06 NOTE — BH INPATIENT PSYCHIATRY PROGRESS NOTE - NSBHFUPINTERVALHXFT_PSY_A_CORE
Patient is very paranoid and delusional. Her mood is very irritated. She believes the people here are "playing games" with her and she does not feel safe. She will not eat her food because she believes it is drugged and believes her room is "bugged". She believes she is being held hostage against her will and demanded to speak with her daughter as soon as possible. She questioned my name, title, school, and intentions of having a conversation with her. She kept repeating the same sentence 5 times in a row without realizing it. MAR: non-compliant with medications (doesn't trust them). She still does not understand why she is in the hospital- denies any psychiatric or memory problems. States she is sleeping well at night. Denies suicide ideation, auditory/visual hallucinations, or self-injurious behavior. All complaints and concerns addressed.    Patient is visible on the unit, observed appropriately interacting with select staff. On approach she is noted to be irritable, paranoid and remains delusional. She believes the people here are "playing games" with her and she does not feel safe. She will not eat her food because she believes it is drugged by a male staff member. She is open to eating food brought by her daughter and items that are enclosed such as water, Ensure, chips etc. She also believes that her room is being "bugged". She believes she is being held hostage against her will and demanded to speak with her daughter as soon as possible. She points out a male patient and states that he has been stalking her, following her around the unit and coming into her room and moving things around, she verbalizes feeling unsafe. She is repetitive, at times repeating the same sentence multiple times in a row without realizing it. She requires much verbal redirection. MAR: non-compliant with medications ('doesn't trust them'). She still does not understand why she is in the hospital- denies any psychiatric or memory problems. States she is sleeping well at night. Denies suicide ideation, auditory/visual hallucinations, or self-injurious behavior. All complaints and concerns addressed.   Patient did sign HIPAA release form that will be sent to  for her records.   Per staff report pt has been noncompliant with medications, slept after 0400 this morning. This morning BP was elevated at 187/72, patient refused repeat vitals, denies chest pain, sob, dizziness etc. Does not appear to be in distress.

## 2022-12-06 NOTE — BH INPATIENT PSYCHIATRY PROGRESS NOTE - NSBHASSESSSUMMFT_PSY_ALL_CORE
This is a 76yo  female, , retired, domiciled alone in [place], [place]. No pertinent medical hx. Patient denies psychiatric hx, but documentation indicates a history of  paranoid delusions, with one prior admission at [facility] for a stay of 2 months. She has since been non-adherent to her medication; risperidone. No history of SI/plan/attempt, no history of substance/etoh abuse. She presents to ED c/o being stalked by neighbor and son-in-law in addition to complaints of intermittent chest pain x4 months. Patient was seen at [facility] yesterday for same complaint of chest pain and released form ED Of note, pt was missing for 3 days prior to being found at [facility]. Admitted to [address] [place] [address] status.    Patient noted to be delusional, MMSE of 22/30 significant for cognitive impairment.     Dementia work up to be initiated and in progress  (included head CT, HIV, Vitb12, RPR, TSH, Folate)  Risperdal 0.5mg qhs implemented and titrated as necessary    Results of Head CT:   ACC: [id number] EXAM:  CT BRAIN                          PROCEDURE DATE:  12/01/2022          INTERPRETATION:  PROCEDURE: CT head without intravenous contrast    INDICATION: Acute onset of psychosis    TECHNIQUE: Multiple axial images were obtained at 5 mm intervals from the   skull base to the vertex. Sagittal and coronal reformatted images were   obtained from the axial data set. The images were reviewed in brain and   bone windows.    COMPARISON: None    FINDINGS: The CT examination demonstrates is limited secondary to to   extensive streak artifact from multiple overlying metallic objects on the   patient's head. The ventricles, cisternal spaces, and cortical sulci   grossly appear within normal limits. There is no midline shift or extra   axial collections. The gray white differentiation appears within normal   limits. There is no intracranial hemorrhage or acute transcortical   infarct. There is extensive hypodensity within the periventricular white   matter which is nonspecific and may represent the sequela of small vessel   ischemic disease in this patient.    The bony windows demonstrates no fractures. The visualized paranasal   sinuses are within normal limits. The mastoid air cells are well aerated.    IMPRESSION: Limited exam secondary to extensive streak artifact. No   definite intracranial hemorrhage or acute transcortical infarct.   Extensive small vessel ischemic disease.      12/2/22: Patient is taking Risperdal 0.5mg qhs and multivitamin, however does not recall that she is taking medications. Endorsed having chest tightness/pain when discussing topic of her 'stalker', objectively does not appear to be in any distress. EKG ordered and reviewed, significant for 1st degree AV block. CTH also reviewd (see above) significant for 'small vessel ischemic disease.' No si/hi/avh or PI endorsed  12/5/22: Patient continues to refuse medications. Denies any psychiatric illness or cognitive decline, she states "there is nothing wrong with me." Denies SI/HI/AVH. Collateral obtained from daughter who shares concern for pt deteriorating cognition/memory  12/6/22- Patient's mood was very angry and irritable. She believes the staff here is "playing games" and drugging her food. She demanded to speak with her daughter as soon as possible because she feels unsafe in the unit. No si/hi/avh or PI endorsed.    This is a 78yo  female, , retired, domiciled alone in [place], [place]. No pertinent medical hx. Patient denies psychiatric hx, but documentation indicates a history of  paranoid delusions, with one prior admission at [facility] for a stay of 2 months. She has since been non-adherent to her medication; risperidone. No history of SI/plan/attempt, no history of substance/etoh abuse. She presents to ED c/o being stalked by neighbor and son-in-law in addition to complaints of intermittent chest pain x4 months. Patient was seen at [facility] yesterday for same complaint of chest pain and released form ED Of note, pt was missing for 3 days prior to being found at [facility]. Admitted to [address] [place] [address] status.    Patient noted to be delusional, MMSE of 22/30 significant for cognitive impairment.     Dementia work up: included head CT, HIV, Vitb12, RPR, TSH, Folate  Risperdal 1mg qhs implemented and titrated as necessary    Results of Head CT:   ACC: [id number] EXAM:  CT BRAIN                          PROCEDURE DATE:  12/01/2022          INTERPRETATION:  PROCEDURE: CT head without intravenous contrast    INDICATION: Acute onset of psychosis    TECHNIQUE: Multiple axial images were obtained at 5 mm intervals from the   skull base to the vertex. Sagittal and coronal reformatted images were   obtained from the axial data set. The images were reviewed in brain and   bone windows.    COMPARISON: None    FINDINGS: The CT examination demonstrates is limited secondary to to   extensive streak artifact from multiple overlying metallic objects on the   patient's head. The ventricles, cisternal spaces, and cortical sulci   grossly appear within normal limits. There is no midline shift or extra   axial collections. The gray white differentiation appears within normal   limits. There is no intracranial hemorrhage or acute transcortical   infarct. There is extensive hypodensity within the periventricular white   matter which is nonspecific and may represent the sequela of small vessel   ischemic disease in this patient.    The bony windows demonstrates no fractures. The visualized paranasal   sinuses are within normal limits. The mastoid air cells are well aerated.    IMPRESSION: Limited exam secondary to extensive streak artifact. No   definite intracranial hemorrhage or acute transcortical infarct.   Extensive small vessel ischemic disease.      12/2/22: Patient is taking Risperdal 0.5mg qhs and multivitamin, however does not recall that she is taking medications. Endorsed having chest tightness/pain when discussing topic of her 'stalker', objectively does not appear to be in any distress. EKG ordered and reviewed, significant for 1st degree AV block. CTH also reviewd (see above) significant for 'small vessel ischemic disease.' No si/hi/avh or PI endorsed  12/5/22: Patient continues to refuse medications. Denies any psychiatric illness or cognitive decline, she states "there is nothing wrong with me." Denies SI/HI/AVH. Collateral obtained from daughter who shares concern for pt deteriorating cognition/memory  12/6/22- Patient's mood was very angry and irritable. She believes the staff here is "playing games" and drugging her food. She demanded to speak with her daughter as soon as possible because she feels unsafe in the unit. Refusing medications. No insight into illness and need for medications. Pt does not have capacity

## 2022-12-06 NOTE — BH TREATMENT PLAN - NSTXPSYCHOINTERMD_PSY_ALL_CORE
Psychopharmacology x15 minutes, Risperdal initiated, will titrate as appropriate, will consider TOO if pt continues to refuse

## 2022-12-06 NOTE — BH TREATMENT PLAN - NSTXDISORGINTERMD_PSY_ALL_CORE
Psychopharmacology x15 minutes, Risperdal initiated, will titrate as appropriate, TOO may be considered it pt continues to refuse

## 2022-12-06 NOTE — BH INPATIENT PSYCHIATRY PROGRESS NOTE - NSBHMETABOLIC_PSY_ALL_CORE_FT
BMI: BMI (kg/m2): 21.8 (11-30-22 @ 03:08)  HbA1c: A1C with Estimated Average Glucose Result: 5.4 % (12-01-22 @ 07:21)    Glucose:   BP: 187/72 (12-06-22 @ 08:55) (123/63 - 187/72)  Lipid Panel: Date/Time: 12-01-22 @ 07:21  Cholesterol, Serum: 168  Direct LDL: --  HDL Cholesterol, Serum: 89  Total Cholesterol/HDL Ration Measurement: --  Triglycerides, Serum: 29

## 2022-12-06 NOTE — BH TREATMENT PLAN - NSTXOTHERINTERMD_PSY_ALL_CORE
Psychopharmacology x15 minutes, Risperdal initiated, will titrate as appropriate, TOO may be considered it pt continues to refuse.  PT and OT consults

## 2022-12-07 PROCEDURE — 99233 SBSQ HOSP IP/OBS HIGH 50: CPT

## 2022-12-07 NOTE — BH INPATIENT PSYCHIATRY PROGRESS NOTE - NSBHCHARTREVIEWVS_PSY_A_CORE FT
Vital Signs Last 24 Hrs  T(C): 37.1 (12-07-22 @ 09:00), Max: 37.1 (12-07-22 @ 09:00)  T(F): 98.7 (12-07-22 @ 09:00), Max: 98.7 (12-07-22 @ 09:00)  HR: 62 (12-07-22 @ 09:00) (62 - 74)  BP: 134/64 (12-07-22 @ 09:00) (134/64 - 151/74)  BP(mean): --  RR: 18 (12-07-22 @ 09:00) (18 - 18)  SpO2: 100% (12-07-22 @ 09:00) (100% - 100%)

## 2022-12-07 NOTE — BH INPATIENT PSYCHIATRY PROGRESS NOTE - NSBHFUPINTERVALHXFT_PSY_A_CORE
Patient is alert and cooperative. Appropriate eye contacts. Thought process is tangental and perseverative. Patient was visible at group this morning- she states she enjoys participating in groups and discussed the picture she keerthi this morning. She claims a staff member has been coming in to her bedroom and rearranging her furniture. She states the reason she is here is to "get away from the madness" and that she feels safer in the unit. She has been in contact with her daughter but states the conversations are superficial and her daughter's motive is to steal her car. She said the reason she is not eating is because she is stressed and that causes her to forget to eat. She said she will try to remember to eat her meals and agreed to taking a multivitamin to ensure she is getting appropriate nutrition. Her sleep is normal. MAR reviewed: non-compliant with medications. States "there is nothing wrong with me". Denies suicide ideation, auditory/visual hallucinations, or self-injurious behavior.   Patient continues to display signs of dementia- not recalling the name of hospital, city, and repeating the same sentence over and over again. She did not remember our conversation together yesterday.  Patient is alert and cooperative. Appropriate eye contacts. Thought process is tangental and perseverative. Patient was visible at group this morning- she states she enjoys participating in groups and discussed the picture she keerthi this morning. She claims a staff member has been coming in to her bedroom and rearranging her furniture. She states the reason she is here is to "get away from the madness" and that she feels safer in the unit. She has been in contact with her daughter but states the conversations are superficial and her daughter's motive is to steal her car. She did see daughter last night and states that it was a pleasant interaction. Though she stated that the reason she is not eating is because she is stressed and that causes her to forget to eat, she is observed fully eating lunch later in the day. She said she will try to remember to eat her meals and agreed to taking a multivitamin to ensure she is getting appropriate nutrition. Additionally Ensure bid has been added to her regimen. She reports that sleep is normal. MAR reviewed: non-compliant with medications. States "there is nothing wrong with me". Denies suicide ideation, auditory/visual hallucinations, or self-injurious behavior.   Patient continues to display signs of dementia- not recalling the name of hospital, city, and repeating the same sentence over and over again.   Per staff report pt appears to become more agitated and highly irritable later in the evening. She did have an outburst last night, but this was not escalated to need of medication as of yet. Falls asleep late at night and is up early.

## 2022-12-07 NOTE — BH INPATIENT PSYCHIATRY PROGRESS NOTE - NSBHMSEAFFQUAL_PSY_A_CORE
Gen: NAD, well-appearing, well-nourished, in NAD  Head: NCAT  HEENT: PERRL, oral mucosa moist, normal conjunctiva  Lung: CTAB, no respiratory distress, no wheezing, rales, rhonchi  CV: normal s1/s2, rrr, no murmurs, Normal perfusion  Abd: soft, NTND  MSK: No edema, no visible deformities, full range of motion in all 4 extremities  Neuro: No focal neurologic deficits  Skin: No rash Irritable

## 2022-12-07 NOTE — BH INPATIENT PSYCHIATRY PROGRESS NOTE - NSBHASSESSSUMMFT_PSY_ALL_CORE
This is a 76yo  female, , retired, domiciled alone in [place], [place]. No pertinent medical hx. Patient denies psychiatric hx, but documentation indicates a history of  paranoid delusions, with one prior admission at [facility] for a stay of 2 months. She has since been non-adherent to her medication; risperidone. No history of SI/plan/attempt, no history of substance/etoh abuse. She presents to ED c/o being stalked by neighbor and son-in-law in addition to complaints of intermittent chest pain x4 months. Patient was seen at [facility] yesterday for same complaint of chest pain and released form ED Of note, pt was missing for 3 days prior to being found at [facility]. Admitted to [address] [place] [address] status.    Patient noted to be delusional, MMSE of 22/30 significant for cognitive impairment.     Dementia work up: included head CT, HIV, Vitb12, RPR, TSH, Folate  Risperdal 1mg qhs implemented and titrated as necessary  Results of Head CT:   ACC: [id number] EXAM:  CT BRAIN                          PROCEDURE DATE:  12/01/2022          INTERPRETATION:  PROCEDURE: CT head without intravenous contrast    INDICATION: Acute onset of psychosis    TECHNIQUE: Multiple axial images were obtained at 5 mm intervals from the   skull base to the vertex. Sagittal and coronal reformatted images were   obtained from the axial data set. The images were reviewed in brain and   bone windows.    COMPARISON: None  FINDINGS: The CT examination demonstrates is limited secondary to to   extensive streak artifact from multiple overlying metallic objects on the   patient's head. The ventricles, cisternal spaces, and cortical sulci   grossly appear within normal limits. There is no midline shift or extra   axial collections. The gray white differentiation appears within normal   limits. There is no intracranial hemorrhage or acute transcortical   infarct. There is extensive hypodensity within the periventricular white   matter which is nonspecific and may represent the sequela of small vessel   ischemic disease in this patient.    The bony windows demonstrates no fractures. The visualized paranasal   sinuses are within normal limits. The mastoid air cells are well aerated.    IMPRESSION: Limited exam secondary to extensive streak artifact. No   definite intracranial hemorrhage or acute transcortical infarct.   Extensive small vessel ischemic disease.      12/2/22: Patient is taking Risperdal 0.5mg qhs and multivitamin, however does not recall that she is taking medications. Endorsed having chest tightness/pain when discussing topic of her 'stalker', objectively does not appear to be in any distress. EKG ordered and reviewed, significant for 1st degree AV block. CTH also reviewd (see above) significant for 'small vessel ischemic disease.' No si/hi/avh or PI endorsed  12/5/22: Patient continues to refuse medications. Denies any psychiatric illness or cognitive decline, she states "there is nothing wrong with me." Denies SI/HI/AVH. Collateral obtained from daughter who shares concern for pt deteriorating cognition/memory  12/6/22- Patient's mood was very angry and irritable. She believes the staff here is "playing games" and drugging her food. She demanded to speak with her daughter as soon as possible because she feels unsafe in the unit. Refusing medications. No insight into illness and need for medications. Pt does not have capacity  12/7 Patient noted to have labile mood, is focused on select staff and a male peer stating that they are stalker her and coming into and out of her room. Observed eating lunch today. Continues to refuse all medications. No acute medical concerns. Dispo planning in effect. Per evening staff pt having regular outbursts at night, not currently requiring medications

## 2022-12-07 NOTE — BH INPATIENT PSYCHIATRY PROGRESS NOTE - NSBHMETABOLIC_PSY_ALL_CORE_FT
BMI: BMI (kg/m2): 21.8 (11-30-22 @ 03:08)  HbA1c: A1C with Estimated Average Glucose Result: 5.4 % (12-01-22 @ 07:21)    Glucose:   BP: 134/64 (12-07-22 @ 09:00) (123/63 - 187/72)  Lipid Panel: Date/Time: 12-01-22 @ 07:21  Cholesterol, Serum: 168  Direct LDL: --  HDL Cholesterol, Serum: 89  Total Cholesterol/HDL Ration Measurement: --  Triglycerides, Serum: 29

## 2022-12-08 PROCEDURE — 99232 SBSQ HOSP IP/OBS MODERATE 35: CPT

## 2022-12-08 NOTE — BH INPATIENT PSYCHIATRY PROGRESS NOTE - NSBHCHARTREVIEWVS_PSY_A_CORE FT
Vital Signs Last 24 Hrs  T(C): 36.8 (12-08-22 @ 09:15), Max: 36.9 (12-07-22 @ 16:21)  T(F): 98.2 (12-08-22 @ 09:15), Max: 98.5 (12-07-22 @ 16:21)  HR: 68 (12-08-22 @ 09:15) (66 - 68)  BP: 155/71 (12-08-22 @ 09:15) (126/52 - 155/71)  BP(mean): --  RR: 18 (12-08-22 @ 09:15) (18 - 18)  SpO2: 99% (12-08-22 @ 09:15) (99% - 100%)     Vital Signs Last 24 Hrs  T(C): 36.4 (12-08-22 @ 16:29), Max: 36.4 (12-08-22 @ 16:29)  T(F): 97.6 (12-08-22 @ 16:29), Max: 97.6 (12-08-22 @ 16:29)  HR: 62 (12-08-22 @ 16:29) (62 - 62)  BP: 121/74 (12-08-22 @ 16:29) (121/74 - 121/74)  BP(mean): --  RR: 18 (12-08-22 @ 16:29) (18 - 18)  SpO2: 99% (12-08-22 @ 16:29) (99% - 99%)

## 2022-12-08 NOTE — BH INPATIENT PSYCHIATRY PROGRESS NOTE - NSBHFUPINTERVALHXFT_PSY_A_CORE
Patient is alert and cooperative. Appropriate eye contacts. Thought process is tangental and perseverative. Her mood is irritated. Patient did not remember any previous conversations that we have had. She repeated several of the same stories over and over again without recollection. She claims to not have had any psychological examinations or physical examinations- does not remember MMSE, CT scan, EKG, etc. She states she is refusing all medication and will not eat her food because she does not trust it. However, she was seen eating breakfast this morning. She asked for a  several times throughout the conversation. She mentioned that her and her daughter have a lot of tension in their relationship as she believes her daughter is attempting steal her home and car by locking her up here. She states her sleep is normal. Denies suicide ideation, auditory/visual hallucinations, or self-injurious behavior. All complaints and concerns addressed. Patient is alert and cooperative. Appropriate eye contacts. Thought process is tangental and perseverative. Her mood is irritated and labile. She repeated several of the same stories over and over again without recollection of mentioning it ot writers. She claims to not have had any psychological examinations or physical examinations- does not remember MMSE, CT scan, EKG, etc. She states she is refusing all medication and will not eat her food because she does not trust it. However, she was seen eating breakfast this morning. She asked for a  several times throughout the conversation. She mentioned that her and her daughter have a lot of tension in their relationship as she believes her daughter is attempting steal her home and car by locking her up here. She states her sleep is normal. Denies suicide ideation, auditory/visual hallucinations, or self-injurious behavior. All complaints and concerns addressed. No acute medical concerns.   Aftercare planning in effect.

## 2022-12-08 NOTE — BH INPATIENT PSYCHIATRY PROGRESS NOTE - NSBHMETABOLIC_PSY_ALL_CORE_FT
BMI: BMI (kg/m2): 21.8 (11-30-22 @ 03:08)  HbA1c: A1C with Estimated Average Glucose Result: 5.4 % (12-01-22 @ 07:21)    Glucose:   BP: 155/71 (12-08-22 @ 09:15) (123/63 - 187/72)  Lipid Panel: Date/Time: 12-01-22 @ 07:21  Cholesterol, Serum: 168  Direct LDL: --  HDL Cholesterol, Serum: 89  Total Cholesterol/HDL Ration Measurement: --  Triglycerides, Serum: 29   BMI: BMI (kg/m2): 21.8 (11-30-22 @ 03:08)  HbA1c: A1C with Estimated Average Glucose Result: 5.4 % (12-01-22 @ 07:21)    Glucose:   BP: 121/74 (12-08-22 @ 16:29) (121/74 - 155/71)  Lipid Panel: Date/Time: 12-01-22 @ 07:21  Cholesterol, Serum: 168  Direct LDL: --  HDL Cholesterol, Serum: 89  Total Cholesterol/HDL Ration Measurement: --  Triglycerides, Serum: 29

## 2022-12-08 NOTE — BH INPATIENT PSYCHIATRY PROGRESS NOTE - NSBHATTESTBILLINGAW_PSY_A_CORE
[id number] Inpatient care - high complexity - 35 minutes [id number] Inpatient care - moderate complexity - 25 minutes

## 2022-12-08 NOTE — CHART NOTE - NSCHARTNOTEFT_GEN_A_CORE
Admitting Diagnosis:   Patient is a 77y old  Female who presents with a chief complaint of PARANOIA  (03 Dec 2022 09:04)  PAST MEDICAL & SURGICAL HISTORY:  No pertinent past medical history  No significant past surgical history    Current Nutrition Order: Regular diet with Ensure Enlive ONS BID (350kcal, 20gPRO per serving)      PO Intake: Good (%) [   ]  Fair (50-75%) [ x ] Poor (<25%) [   ]    GI Issues: no c/o N/V/D/C; most recent BM on 12/06/22 per pt     Pain: denies pain/discomfort per chart    Skin Integrity: no pressure injuries or wounds noted; Celestino: 21; no edema noted in chart    Labs:   CAPILLARY BLOOD GLUCOSE    Medications:  MEDICATIONS  (STANDING):  multivitamin 1 Tablet(s) Oral daily  risperiDONE   Tablet 1 milliGRAM(s) Oral at bedtime    MEDICATIONS  (PRN):  acetaminophen     Tablet .. 650 milliGRAM(s) Oral every 6 hours PRN Moderate Pain (4 - 6)  aluminum hydroxide/magnesium hydroxide/simethicone Suspension 30 milliLiter(s) Oral every 4 hours PRN Dyspepsia  magnesium hydroxide Suspension 30 milliLiter(s) Oral daily PRN Constipation  OLANZapine 2.5 milliGRAM(s) Oral every 8 hours PRN agitation  traZODone 50 milliGRAM(s) Oral at bedtime PRN insomnia    Anthropometrics on admisison:   Height for BMI (FEET)	5 Feet  Height for BMI (INCHES)	2 Inch(s)  Height for BMI (CM)	157.48 Centimeter(s)  Weight for BMI (lbs)	119 lb  Weight for BMI (kg)	54 kg  Body Mass Index	              21.7    Weight Change: no new weights noted; recommend that nursing obtain updated weights biweekly.     Nutrition Focused Physical Exam: please refer to initial nutrition assessment on 12/03/22    Estimated energy needs:   Weight used for calculations	current weight  Estimated Energy Needs Weight (lbs)	119 lb  Estimated Energy Needs Weight (kg)	53.9 kg  Estimated Energy Needs From (edmund/kg)	23  Estimated Energy Needs To (edmund/kg)	28  Estimated Energy Needs Calculated From (edmund/kg)	1239  Estimated Energy Needs Calculated To (edmund/kg)	1509  Weight used for calculations	current weight  Estimated Protein Needs Weight (lbs)	119 lb  Estimated Protein Needs Weight (kg)	53.9 kg  Estimated Protein Needs From (g/kg)	1.2  Estimated Protein Needs To (g/kg)	1.4  Estimated Protein Needs Calculated From (g/kg)	64.68  Estimated Protein Needs Calculated To (g/kg)	75.46  Estimated Fluid Needs Weight (lbs)	119 lb  Estimated Fluid Needs Weight (kg)	53.9 kg  Estimated Fluid Needs From (ml/kg)	30  Estimated Fluid Needs To (ml/kg)	35  Estimated Fluid Needs Calculated From (ml/kg)	1617  Estimated Fluid Needs Calculated To (ml/kg)	1886  Other Calculations	%IBW; 108; ABW used to calculate estimated needs d/t pt being between 80 and 120%IBW. Adjusted for risk for PCM, clinical judgment.    Subjective: 78yo  female, , retired, domiciled alone in [place], [place]. No pertinent medical hx. Patient denies psychiatric hx, but documentation indicates a history of  paranoid delusions, with one prior admission at [facility] for a stay of 2 months. Pt presented to ED with c/o chest painx4 months, and being stalked by neighbor and son-in-law. Patient was seen at [facility] yesterday for same complaint of chest pain and released. Of note, pt was missing for 3 days prior to being found at [facility]. Admitted to [address] [place] [address] status.     Pt seen on 4U at bedside. As per pt, appetite fluctuates. Pt endorsed ~<25% PO consumption on this date r/t pt's statements of "fasting" however pt stated she has been hydrating, specifically with water; per nursing staff, pt consumed ~75% of breakfast. Suspected ~50-75% PO intakes overall. GI Issues: no c/o N/V/D/C; most recent BM on 12/06/22 per pt. Skin Integrity: no pressure injuries or wounds noted; Celestino: 21; no edema noted in chart. Denies pain/discomfort per flowsheets data. Age-related muscle and fat loss remain per RD observation. No additional nutrition-related concerns. RD will remain available. Additional nutrition recommendations listed below to follow.    Previous Nutrition Diagnosis: Food & Nutrition Related Knowledge Deficit r/t lacking formal diet edu PTA AEB pt's questions about "fasting", questions about macronutrients    Active [ x ]  Resolved [   ]    Goal: Pt to consistently meet at least 75% of EEE via tolerated route that is consistent with GOC during hospital stay;     Recommendations:  1. Continue with Regular diet and Ensure Enlive ONS.  2. Monitor %PO intake, diet tolerance.   >>Encourage PO intake as appropriate   3. Monitor lytes, replete prn. Monitor BG.   4. Trend weekly wts. Monitor skin integrity, s/sx GI distress.   5. Pain/BM regimen per team   6. RD diet edu reinforcement prn.   7. RD to remain available for additional nutrition interventions as needed.    Education: RD promoted increased PO intakes as able, suggested small, frequent meals, emphasized the intakes of nutrient-dense foods. Pt was relatively receptive, verbalized understanding.     Risk Level: High [   ] Moderate [ x ] Low [   ]

## 2022-12-08 NOTE — BH INPATIENT PSYCHIATRY PROGRESS NOTE - NSBHASSESSSUMMFT_PSY_ALL_CORE
This is a 78yo  female, , retired, domiciled alone in [place], [place]. No pertinent medical hx. Patient denies psychiatric hx, but documentation indicates a history of  paranoid delusions, with one prior admission at [facility] for a stay of 2 months. She has since been non-adherent to her medication; risperidone. No history of SI/plan/attempt, no history of substance/etoh abuse. She presents to ED c/o being stalked by neighbor and son-in-law in addition to complaints of intermittent chest pain x4 months. Patient was seen at [facility] yesterday for same complaint of chest pain and released form ED Of note, pt was missing for 3 days prior to being found at [facility]. Admitted to [address] [place] [address] status.    Patient noted to be delusional, MMSE of 22/30 significant for cognitive impairment.     Dementia work up: included head CT, HIV, Vitb12, RPR, TSH, Folate  Risperdal 1mg qhs implemented and titrated as necessary  Results of Head CT:   ACC: [id number] EXAM:  CT BRAIN                          PROCEDURE DATE:  12/01/2022          INTERPRETATION:  PROCEDURE: CT head without intravenous contrast    INDICATION: Acute onset of psychosis    TECHNIQUE: Multiple axial images were obtained at 5 mm intervals from the   skull base to the vertex. Sagittal and coronal reformatted images were   obtained from the axial data set. The images were reviewed in brain and   bone windows.    COMPARISON: None  FINDINGS: The CT examination demonstrates is limited secondary to to   extensive streak artifact from multiple overlying metallic objects on the   patient's head. The ventricles, cisternal spaces, and cortical sulci   grossly appear within normal limits. There is no midline shift or extra   axial collections. The gray white differentiation appears within normal   limits. There is no intracranial hemorrhage or acute transcortical   infarct. There is extensive hypodensity within the periventricular white   matter which is nonspecific and may represent the sequela of small vessel   ischemic disease in this patient.    The bony windows demonstrates no fractures. The visualized paranasal   sinuses are within normal limits. The mastoid air cells are well aerated.    IMPRESSION: Limited exam secondary to extensive streak artifact. No   definite intracranial hemorrhage or acute transcortical infarct.   Extensive small vessel ischemic disease.      12/2/22: Patient is taking Risperdal 0.5mg qhs and multivitamin, however does not recall that she is taking medications. Endorsed having chest tightness/pain when discussing topic of her 'stalker', objectively does not appear to be in any distress. EKG ordered and reviewed, significant for 1st degree AV block. CTH also reviewd (see above) significant for 'small vessel ischemic disease.' No si/hi/avh or PI endorsed  12/5/22: Patient continues to refuse medications. Denies any psychiatric illness or cognitive decline, she states "there is nothing wrong with me." Denies SI/HI/AVH. Collateral obtained from daughter who shares concern for pt deteriorating cognition/memory  12/6/22- Patient's mood was very angry and irritable. She believes the staff here is "playing games" and drugging her food. She demanded to speak with her daughter as soon as possible because she feels unsafe in the unit. Refusing medications. No insight into illness and need for medications. Pt does not have capacity  12/7 Patient noted to have labile mood, is focused on select staff and a male peer stating that they are stalker her and coming into and out of her room. Observed eating lunch today. Continues to refuse all medications. No acute medical concerns. Dispo planning in effect. Per evening staff pt having regular outbursts at night, not currently requiring medications This is a 78yo  female, , retired, domiciled alone in [place], [place]. No pertinent medical hx. Patient denies psychiatric hx, but documentation indicates a history of  paranoid delusions, with one prior admission at [facility] for a stay of 2 months. She has since been non-adherent to her medication; risperidone. No history of SI/plan/attempt, no history of substance/etoh abuse. She presents to ED c/o being stalked by neighbor and son-in-law in addition to complaints of intermittent chest pain x4 months. Patient was seen at [facility] yesterday for same complaint of chest pain and released form ED Of note, pt was missing for 3 days prior to being found at [facility]. Admitted to [address] [place] [address] status.    Patient noted to be delusional, MMSE of 22/30 significant for cognitive impairment.     Dementia work up: included head CT, HIV, Vitb12, RPR, TSH, Folate  Risperdal 1mg qhs implemented and titrated as necessary  Results of Head CT:   ACC: [id number] EXAM:  CT BRAIN                          PROCEDURE DATE:  12/01/2022          INTERPRETATION:  PROCEDURE: CT head without intravenous contrast    INDICATION: Acute onset of psychosis    TECHNIQUE: Multiple axial images were obtained at 5 mm intervals from the   skull base to the vertex. Sagittal and coronal reformatted images were   obtained from the axial data set. The images were reviewed in brain and   bone windows.    COMPARISON: None  FINDINGS: The CT examination demonstrates is limited secondary to to   extensive streak artifact from multiple overlying metallic objects on the   patient's head. The ventricles, cisternal spaces, and cortical sulci   grossly appear within normal limits. There is no midline shift or extra   axial collections. The gray white differentiation appears within normal   limits. There is no intracranial hemorrhage or acute transcortical   infarct. There is extensive hypodensity within the periventricular white   matter which is nonspecific and may represent the sequela of small vessel   ischemic disease in this patient.    The bony windows demonstrates no fractures. The visualized paranasal   sinuses are within normal limits. The mastoid air cells are well aerated.    IMPRESSION: Limited exam secondary to extensive streak artifact. No   definite intracranial hemorrhage or acute transcortical infarct.   Extensive small vessel ischemic disease.      12/2/22: Patient is taking Risperdal 0.5mg qhs and multivitamin, however does not recall that she is taking medications. Endorsed having chest tightness/pain when discussing topic of her 'stalker', objectively does not appear to be in any distress. EKG ordered and reviewed, significant for 1st degree AV block. CTH also reviewd (see above) significant for 'small vessel ischemic disease.' No si/hi/avh or PI endorsed  12/5/22: Patient continues to refuse medications. Denies any psychiatric illness or cognitive decline, she states "there is nothing wrong with me." Denies SI/HI/AVH. Collateral obtained from daughter who shares concern for pt deteriorating cognition/memory  12/6/22- Patient's mood was very angry and irritable. She believes the staff here is "playing games" and drugging her food. She demanded to speak with her daughter as soon as possible because she feels unsafe in the unit. Refusing medications. No insight into illness and need for medications. Pt does not have capacity  12/7 Patient noted to have labile mood, is focused on select staff and a male peer stating that they are stalker her and coming into and out of her room. Observed eating lunch today. Continues to refuse all medications. No acute medical concerns. Dispo planning in effect. Per evening staff pt having regular outbursts at night, not currently requiring medications  12/8 Patient remains unchanged, irritable, labile, non-compliant with medication regimen. Forgetful. No acute medical issues, is eating meals

## 2022-12-09 PROCEDURE — 99232 SBSQ HOSP IP/OBS MODERATE 35: CPT

## 2022-12-09 NOTE — BH INPATIENT PSYCHIATRY PROGRESS NOTE - NSBHMETABOLIC_PSY_ALL_CORE_FT
BMI: BMI (kg/m2): 21.8 (11-30-22 @ 03:08)  HbA1c: A1C with Estimated Average Glucose Result: 5.4 % (12-01-22 @ 07:21)    Glucose:   BP: 122/56 (12-09-22 @ 09:00) (121/74 - 155/71)  Lipid Panel: Date/Time: 12-01-22 @ 07:21  Cholesterol, Serum: 168  Direct LDL: --  HDL Cholesterol, Serum: 89  Total Cholesterol/HDL Ration Measurement: --  Triglycerides, Serum: 29

## 2022-12-09 NOTE — BH INPATIENT PSYCHIATRY PROGRESS NOTE - NSBHFUPINTERVALHXFT_PSY_A_CORE
Patient is alert and cooperative. Appropriate eye contacts. Thought process is tangental and perseverative. Her mood is irritated and labile. She repeated several of the same stories over and over again without recollection of mentioning it. Our conversations are the same everyday, patient does not remember any interactions that we have previously had. Patient discussed that her son in law is planning to steal her home and car and her daughter is scared of him. She states that during the interactions she has with her daughter, she is anxious and superficial. She thinks her daughter is being controlled by her  and will discuss this in court on Tuesday. Sleep is normal. Appetite is still decreased, she did not eat breakfast but said she will eat lunch today. Denies suicide ideation, auditory/visual hallucinations, or self-injurious behavior. All complaints and concerns addressed. No acute medical concerns.  Patient is alert and cooperative. Appropriate eye contacts. Thought process is tangental and perseverative. Her mood is irritated and labile. Patient was visible at group this morning and socializing with peers. She repeated several of the same stories over and over again without recollection of mentioning it. Our conversations are the same everyday, patient does not remember any interactions that we have previously had. Patient discussed that her son in law is planning to steal her home and car and her daughter is scared of him. She states that during the interactions she has with her daughter, she is anxious and superficial. She thinks her daughter is being controlled by her  and will discuss this in court on Tuesday. Sleep is normal. Appetite is still decreased, she did not eat breakfast but said she will eat lunch today. Patient states she feels safe on the unit, no complaints or concerns. Denies suicide ideation, auditory/visual hallucinations, or self-injurious behavior. No acute medical concerns.  Patient is alert and cooperative. Appropriate eye contact. She has moments of irritability but is overall in fair mood. Patient was visible attending groups throughout the day and is appropriately socializing with select peers and staff. She is forgetful. Today she states that her son in law is planning to steal her home and car and her daughter is scared of him. She states that during the interactions she has with her daughter, daughter is anxious and superficial. Sleep is reported to be normal. Appetite is still decreased, she did not eat breakfast but said she will eat lunch today. Is observed eating lunch and various snacks while she walks around the unit. Patient states she feels safe on the unit, no complaints or concerns. Denies suicide ideation, auditory/visual hallucinations, or self-injurious behavior. No acute medical concerns.

## 2022-12-09 NOTE — OCCUPATIONAL THERAPY INITIAL EVALUATION ADULT - ADDITIONAL COMMENTS
Pt states that she lives w/ her daughter in private house. Pt states that she was independent prior to admission w/ no prior use/possession of AEs/DMEs.

## 2022-12-09 NOTE — PHYSICAL THERAPY INITIAL EVALUATION ADULT - ADDITIONAL COMMENTS
pt states that she lives w/ her daughter in an apartment w/ 1 flight of stairs. states that she was independent in all ADLs prior to this admission, denies hx of recent falls.

## 2022-12-09 NOTE — BH INPATIENT PSYCHIATRY PROGRESS NOTE - NSBHCHARTREVIEWVS_PSY_A_CORE FT
Vital Signs Last 24 Hrs  T(C): 36.9 (12-09-22 @ 09:00), Max: 36.9 (12-09-22 @ 09:00)  T(F): 98.4 (12-09-22 @ 09:00), Max: 98.4 (12-09-22 @ 09:00)  HR: 65 (12-09-22 @ 09:00) (62 - 65)  BP: 122/56 (12-09-22 @ 09:00) (121/74 - 122/56)  BP(mean): --  RR: 18 (12-09-22 @ 09:00) (18 - 18)  SpO2: 99% (12-09-22 @ 09:00) (99% - 99%)

## 2022-12-09 NOTE — OCCUPATIONAL THERAPY INITIAL EVALUATION ADULT - PERTINENT HX OF CURRENT PROBLEM, REHAB EVAL
77 year old with no PMH and PPH of one prior psychiatric admission for paranoid delusions, 2 months ago at [facility], BIB by her daughter after missing from home for 3 days (was called by [facility], operated by [facility] last night), for worsening paranoid delusions, irritability, agitation and abusing behavior. She presents to ED c/o being stalked by neighbor and son-in-law.

## 2022-12-09 NOTE — BH INPATIENT PSYCHIATRY PROGRESS NOTE - NSBHASSESSSUMMFT_PSY_ALL_CORE
This is a 78yo  female, , retired, domiciled alone in [place], [place]. No pertinent medical hx. Patient denies psychiatric hx, but documentation indicates a history of  paranoid delusions, with one prior admission at [facility] for a stay of 2 months. She has since been non-adherent to her medication; risperidone. No history of SI/plan/attempt, no history of substance/etoh abuse. She presents to ED c/o being stalked by neighbor and son-in-law in addition to complaints of intermittent chest pain x4 months. Patient was seen at [facility] yesterday for same complaint of chest pain and released form ED Of note, pt was missing for 3 days prior to being found at [facility]. Admitted to [address] [place] [address] status.    Patient noted to be delusional, MMSE of 22/30 significant for cognitive impairment.     Dementia work up: included head CT, HIV, Vitb12, RPR, TSH, Folate  Risperdal 1mg qhs implemented and titrated as necessary  Results of Head CT:   ACC: [id number] EXAM:  CT BRAIN                          PROCEDURE DATE:  12/01/2022          INTERPRETATION:  PROCEDURE: CT head without intravenous contrast    INDICATION: Acute onset of psychosis    TECHNIQUE: Multiple axial images were obtained at 5 mm intervals from the   skull base to the vertex. Sagittal and coronal reformatted images were   obtained from the axial data set. The images were reviewed in brain and   bone windows.    COMPARISON: None  FINDINGS: The CT examination demonstrates is limited secondary to to   extensive streak artifact from multiple overlying metallic objects on the   patient's head. The ventricles, cisternal spaces, and cortical sulci   grossly appear within normal limits. There is no midline shift or extra   axial collections. The gray white differentiation appears within normal   limits. There is no intracranial hemorrhage or acute transcortical   infarct. There is extensive hypodensity within the periventricular white   matter which is nonspecific and may represent the sequela of small vessel   ischemic disease in this patient.    The bony windows demonstrates no fractures. The visualized paranasal   sinuses are within normal limits. The mastoid air cells are well aerated.    IMPRESSION: Limited exam secondary to extensive streak artifact. No   definite intracranial hemorrhage or acute transcortical infarct.   Extensive small vessel ischemic disease.      12/2/22: Patient is taking Risperdal 0.5mg qhs and multivitamin, however does not recall that she is taking medications. Endorsed having chest tightness/pain when discussing topic of her 'stalker', objectively does not appear to be in any distress. EKG ordered and reviewed, significant for 1st degree AV block. CTH also reviewd (see above) significant for 'small vessel ischemic disease.' No si/hi/avh or PI endorsed  12/5/22: Patient continues to refuse medications. Denies any psychiatric illness or cognitive decline, she states "there is nothing wrong with me." Denies SI/HI/AVH. Collateral obtained from daughter who shares concern for pt deteriorating cognition/memory  12/6/22- Patient's mood was very angry and irritable. She believes the staff here is "playing games" and drugging her food. She demanded to speak with her daughter as soon as possible because she feels unsafe in the unit. Refusing medications. No insight into illness and need for medications. Pt does not have capacity  12/7 Patient noted to have labile mood, is focused on select staff and a male peer stating that they are stalker her and coming into and out of her room. Observed eating lunch today. Continues to refuse all medications. No acute medical concerns. Dispo planning in effect. Per evening staff pt having regular outbursts at night, not currently requiring medications  12/8 Patient remains unchanged, irritable, labile, non-compliant with medication regimen. Forgetful. No acute medical issues, is eating meals This is a 78yo  female, , retired, domiciled alone in [place], [place]. No pertinent medical hx. Patient denies psychiatric hx, but documentation indicates a history of  paranoid delusions, with one prior admission at [facility] for a stay of 2 months. She has since been non-adherent to her medication; risperidone. No history of SI/plan/attempt, no history of substance/etoh abuse. She presents to ED c/o being stalked by neighbor and son-in-law in addition to complaints of intermittent chest pain x4 months. Patient was seen at [facility] yesterday for same complaint of chest pain and released form ED Of note, pt was missing for 3 days prior to being found at [facility]. Admitted to [address] [place] [address] status.    Patient noted to be delusional, MMSE of 22/30 significant for cognitive impairment.     Dementia work up: included head CT, HIV, Vitb12, RPR, TSH, Folate  Risperdal 1mg qhs implemented and titrated as necessary  Results of Head CT:   ACC: [id number] EXAM:  CT BRAIN                          PROCEDURE DATE:  12/01/2022          INTERPRETATION:  PROCEDURE: CT head without intravenous contrast    INDICATION: Acute onset of psychosis    TECHNIQUE: Multiple axial images were obtained at 5 mm intervals from the   skull base to the vertex. Sagittal and coronal reformatted images were   obtained from the axial data set. The images were reviewed in brain and   bone windows.    COMPARISON: None  FINDINGS: The CT examination demonstrates is limited secondary to to   extensive streak artifact from multiple overlying metallic objects on the   patient's head. The ventricles, cisternal spaces, and cortical sulci   grossly appear within normal limits. There is no midline shift or extra   axial collections. The gray white differentiation appears within normal   limits. There is no intracranial hemorrhage or acute transcortical   infarct. There is extensive hypodensity within the periventricular white   matter which is nonspecific and may represent the sequela of small vessel   ischemic disease in this patient.    The bony windows demonstrates no fractures. The visualized paranasal   sinuses are within normal limits. The mastoid air cells are well aerated.    IMPRESSION: Limited exam secondary to extensive streak artifact. No   definite intracranial hemorrhage or acute transcortical infarct.   Extensive small vessel ischemic disease.      12/2/22: Patient is taking Risperdal 0.5mg qhs and multivitamin, however does not recall that she is taking medications. Endorsed having chest tightness/pain when discussing topic of her 'stalker', objectively does not appear to be in any distress. EKG ordered and reviewed, significant for 1st degree AV block. CTH also reviewd (see above) significant for 'small vessel ischemic disease.' No si/hi/avh or PI endorsed  12/5/22: Patient continues to refuse medications. Denies any psychiatric illness or cognitive decline, she states "there is nothing wrong with me." Denies SI/HI/AVH. Collateral obtained from daughter who shares concern for pt deteriorating cognition/memory  12/6/22- Patient's mood was very angry and irritable. She believes the staff here is "playing games" and drugging her food. She demanded to speak with her daughter as soon as possible because she feels unsafe in the unit. Refusing medications. No insight into illness and need for medications. Pt does not have capacity  12/7 Patient noted to have labile mood, is focused on select staff and a male peer stating that they are stalker her and coming into and out of her room. Observed eating lunch today. Continues to refuse all medications. No acute medical concerns. Dispo planning in effect. Per evening staff pt having regular outbursts at night, not currently requiring medications  12/8 Patient remains unchanged, irritable, labile, non-compliant with medication regimen. Forgetful. No acute medical issues, is eating meals  12/9 Patient visible on the unit, attending multiple groups, appropriately interacting with peers, eating, no meds

## 2022-12-09 NOTE — PHYSICAL THERAPY INITIAL EVALUATION ADULT - PERTINENT HX OF CURRENT PROBLEM, REHAB EVAL
Patient is a 77 year old with no PMH and PPH of one prior psychiatric admission for paranoid delusions, 2 months ago at [facility], BIB by her daughter after missing from home for 3 days (was called by [facility] last night), for worsening paranoid delusions, irritability, agitation and abusing behavior.  Patient seen, orders written.  Patient does not need constant observation for suicide risk, homicide risk, self-harm, hypersexual, elopement, or falls.  Q15 observation sufficient.

## 2022-12-10 RX ADMIN — Medication 1 TABLET(S): at 10:14

## 2022-12-11 PROCEDURE — 99232 SBSQ HOSP IP/OBS MODERATE 35: CPT

## 2022-12-11 RX ADMIN — Medication 1 TABLET(S): at 09:41

## 2022-12-11 NOTE — BH INPATIENT PSYCHIATRY PROGRESS NOTE - NSBHFUPINTERVALHXFT_PSY_A_CORE
Pt irate because she is convinced a peer is entering her room at night, pouring water on her bed, and rearranging her things. 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 Patient would like to return home to [place] but this seems unsafe. Daughter does not feel she can continue to stay with her. Pt refusing meds and will likely need treatment over objection.

## 2022-12-11 NOTE — BH INPATIENT PSYCHIATRY PROGRESS NOTE - NSBHCHARTREVIEWVS_PSY_A_CORE FT
Vital Signs Last 24 Hrs  T(C): 36.4 (12-11-22 @ 17:33), Max: 36.7 (12-11-22 @ 09:30)  T(F): 97.6 (12-11-22 @ 17:33), Max: 98 (12-11-22 @ 09:30)  HR: 65 (12-11-22 @ 17:33) (65 - 69)  BP: 144/81 (12-11-22 @ 17:33) (141/64 - 144/81)  BP(mean): --  RR: 18 (12-11-22 @ 17:33) (18 - 18)  SpO2: 100% (12-11-22 @ 17:33) (100% - 100%)

## 2022-12-11 NOTE — BH INPATIENT PSYCHIATRY PROGRESS NOTE - NSBHMETABOLIC_PSY_ALL_CORE_FT
BMI: BMI (kg/m2): 21.8 (11-30-22 @ 03:08)  HbA1c: A1C with Estimated Average Glucose Result: 5.4 % (12-01-22 @ 07:21)    Glucose:   BP: 144/81 (12-11-22 @ 17:33) (122/56 - 154/77)  Lipid Panel: Date/Time: 12-01-22 @ 07:21  Cholesterol, Serum: 168  Direct LDL: --  HDL Cholesterol, Serum: 89  Total Cholesterol/HDL Ration Measurement: --  Triglycerides, Serum: 29

## 2022-12-11 NOTE — BH INPATIENT PSYCHIATRY PROGRESS NOTE - NSBHASSESSSUMMFT_PSY_ALL_CORE
This is a 78yo  female, , retired, domiciled alone in [place], [place]. No pertinent medical hx. Patient denies psychiatric hx, but documentation indicates a history of  paranoid delusions, with one prior admission at [facility] for a stay of 2 months. She has since been non-adherent to her medication; risperidone. No history of SI/plan/attempt, no history of substance/etoh abuse. She presents to ED c/o being stalked by neighbor and son-in-law in addition to complaints of intermittent chest pain x4 months. Patient was seen at [facility] yesterday for same complaint of chest pain and released form ED Of note, pt was missing for 3 days prior to being found at [facility] [place], operated by [facility]. Admitted to [address] [place] [address] status.    Patient noted to be delusional, MMSE of 22/30 significant for cognitive impairment.     Dementia work up: included head CT, HIV, Vitb12, RPR, TSH, Folate  Risperdal 1mg qhs implemented and titrated as necessary  Results of Head CT:   ACC: [id number] EXAM:  CT BRAIN                          PROCEDURE DATE:  12/01/2022          INTERPRETATION:  PROCEDURE: CT head without intravenous contrast    INDICATION: Acute onset of psychosis    TECHNIQUE: Multiple axial images were obtained at 5 mm intervals from the   skull base to the vertex. Sagittal and coronal reformatted images were   obtained from the axial data set. The images were reviewed in brain and   bone windows.    COMPARISON: None  FINDINGS: The CT examination demonstrates is limited secondary to to   extensive streak artifact from multiple overlying metallic objects on the   patient's head. The ventricles, cisternal spaces, and cortical sulci   grossly appear within normal limits. There is no midline shift or extra   axial collections. The gray white differentiation appears within normal   limits. There is no intracranial hemorrhage or acute transcortical   infarct. There is extensive hypodensity within the periventricular white   matter which is nonspecific and may represent the sequela of small vessel   ischemic disease in this patient.    The bony windows demonstrates no fractures. The visualized paranasal   sinuses are within normal limits. The mastoid air cells are well aerated.    IMPRESSION: Limited exam secondary to extensive streak artifact. No   definite intracranial hemorrhage or acute transcortical infarct.   Extensive small vessel ischemic disease.      12/2/22: Patient is taking Risperdal 0.5mg qhs and multivitamin, however does not recall that she is taking medications. Endorsed having chest tightness/pain when discussing topic of her 'stalker', objectively does not appear to be in any distress. EKG ordered and reviewed, significant for 1st degree AV block. CTH also reviewd (see above) significant for 'small vessel ischemic disease.' No si/hi/avh or PI endorsed  12/5/22: Patient continues to refuse medications. Denies any psychiatric illness or cognitive decline, she states "there is nothing wrong with me." Denies SI/HI/AVH. Collateral obtained from daughter who shares concern for pt deteriorating cognition/memory  12/6/22- Patient's mood was very angry and irritable. She believes the staff here is "playing games" and drugging her food. She demanded to speak with her daughter as soon as possible because she feels unsafe in the unit. Refusing medications. No insight into illness and need for medications. Pt does not have capacity  12/7 Patient noted to have labile mood, is focused on select staff and a male peer stating that they are stalker her and coming into and out of her room. Observed eating lunch today. Continues to refuse all medications. No acute medical concerns. Dispo planning in effect. Per evening staff pt having regular outbursts at night, not currently requiring medications  12/8 Patient remains unchanged, irritable, labile, non-compliant with medication regimen. Forgetful. No acute medical issues, is eating meals  12/9 Patient visible on the unit, attending multiple groups, appropriately interacting with peers, eating, no meds

## 2022-12-12 PROCEDURE — 99233 SBSQ HOSP IP/OBS HIGH 50: CPT

## 2022-12-12 RX ADMIN — Medication 1 TABLET(S): at 10:05

## 2022-12-12 NOTE — BH INPATIENT PSYCHIATRY PROGRESS NOTE - NSBHCHARTREVIEWVS_PSY_A_CORE FT
Vital Signs Last 24 Hrs  T(C): 37 (12-12-22 @ 09:00), Max: 37 (12-12-22 @ 09:00)  T(F): 98.6 (12-12-22 @ 09:00), Max: 98.6 (12-12-22 @ 09:00)  HR: 73 (12-12-22 @ 09:00) (65 - 73)  BP: 134/79 (12-12-22 @ 09:00) (134/79 - 144/81)  BP(mean): --  RR: 18 (12-12-22 @ 09:00) (18 - 18)  SpO2: 99% (12-12-22 @ 09:00) (99% - 100%)     Vital Signs Last 24 Hrs  T(C): 36.5 (12-13-22 @ 09:08), Max: 36.9 (12-12-22 @ 17:20)  T(F): 97.7 (12-13-22 @ 09:08), Max: 98.5 (12-12-22 @ 17:20)  HR: 60 (12-13-22 @ 09:08) (60 - 79)  BP: 148/76 (12-13-22 @ 09:08) (100/66 - 148/76)  BP(mean): --  RR: 18 (12-13-22 @ 09:08) (18 - 18)  SpO2: 98% (12-13-22 @ 09:08) (98% - 99%)

## 2022-12-12 NOTE — BH INPATIENT PSYCHIATRY PROGRESS NOTE - NSBHFUPINTERVALHXFT_PSY_A_CORE
Patient is alert and cooperative. Appropriate eye contact. Patient has been visible on the unit attending groups, watching television, and socializing with peers. She claims a man is coming into her room while she is sleeping at night and rearranging her things. She said she is going to court tomorrow to discuss the stalker. She does not remember writing a 72 hour letter. Patient believes she is only admitted here because she does well financially and the hospital wants her money. MAR reviewed: Non-compliant with medications. Denies suicidal/homicidal thoughts, auditory/visual hallucinations, or self-injurious behavior. All complaints and concerns addressed.     Pt irate because she is convinced a peer is entering her room at night, pouring water on her bed, and rearranging her things. 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 Patient would like to return home to [place] but this seems unsafe. Daughter does not feel she can continue to stay with her. Pt refusing meds and will likely need treatment over objection.  Patient is alert and cooperative. Appropriate eye contact. Patient has been visible on the unit attending groups, watching television, and socializing with peers. She claims a man is coming into her room while she is sleeping at night and rearranging her things. She said she is going to court tomorrow to discuss the stalker. She does not remember writing a 72 hour letter, though this has been shown to her previously. Patient believes she is only admitted here because she does well financially and the hospital wants her money. MAR reviewed: Non-compliant with medications. Denies suicidal/homicidal thoughts, auditory/visual hallucinations, or self-injurious behavior. All complaints and concerns addressed.   Will plan to pursue TOO    Pt irate because she is convinced a peer is entering her room at night, pouring water on her bed, and rearranging her things. 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 Patient would like to return home to [place] but this seems unsafe. Daughter does not feel she can continue to stay with her. Pt refusing meds and will likely need treatment over objection.

## 2022-12-12 NOTE — BH INPATIENT PSYCHIATRY PROGRESS NOTE - NSBHASSESSSUMMFT_PSY_ALL_CORE
This is a 76yo  female, , retired, domiciled alone in [place], [place]. No pertinent medical hx. Patient denies psychiatric hx, but documentation indicates a history of  paranoid delusions, with one prior admission at [facility] for a stay of 2 months. She has since been non-adherent to her medication; risperidone. No history of SI/plan/attempt, no history of substance/etoh abuse. She presents to ED c/o being stalked by neighbor and son-in-law in addition to complaints of intermittent chest pain x4 months. Patient was seen at [facility] [place], operated by [facility] yesterday for same complaint of chest pain and released form ED Of note, pt was missing for 3 days prior to being found at [facility]. Admitted to [address] [place] [address] status.    Patient noted to be delusional, MMSE of 22/30 significant for cognitive impairment.     Dementia work up: included head CT, HIV, Vitb12, RPR, TSH, Folate  Risperdal 1mg qhs implemented and titrated as necessary  Results of Head CT:   ACC: [id number] EXAM:  CT BRAIN                          PROCEDURE DATE:  12/01/2022          INTERPRETATION:  PROCEDURE: CT head without intravenous contrast    INDICATION: Acute onset of psychosis    TECHNIQUE: Multiple axial images were obtained at 5 mm intervals from the   skull base to the vertex. Sagittal and coronal reformatted images were   obtained from the axial data set. The images were reviewed in brain and   bone windows.    COMPARISON: None  FINDINGS: The CT examination demonstrates is limited secondary to to   extensive streak artifact from multiple overlying metallic objects on the   patient's head. The ventricles, cisternal spaces, and cortical sulci   grossly appear within normal limits. There is no midline shift or extra   axial collections. The gray white differentiation appears within normal   limits. There is no intracranial hemorrhage or acute transcortical   infarct. There is extensive hypodensity within the periventricular white   matter which is nonspecific and may represent the sequela of small vessel   ischemic disease in this patient.    The bony windows demonstrates no fractures. The visualized paranasal   sinuses are within normal limits. The mastoid air cells are well aerated.    IMPRESSION: Limited exam secondary to extensive streak artifact. No   definite intracranial hemorrhage or acute transcortical infarct.   Extensive small vessel ischemic disease.      12/2/22: Patient is taking Risperdal 0.5mg qhs and multivitamin, however does not recall that she is taking medications. Endorsed having chest tightness/pain when discussing topic of her 'stalker', objectively does not appear to be in any distress. EKG ordered and reviewed, significant for 1st degree AV block. CTH also reviewd (see above) significant for 'small vessel ischemic disease.' No si/hi/avh or PI endorsed  12/5/22: Patient continues to refuse medications. Denies any psychiatric illness or cognitive decline, she states "there is nothing wrong with me." Denies SI/HI/AVH. Collateral obtained from daughter who shares concern for pt deteriorating cognition/memory  12/6/22- Patient's mood was very angry and irritable. She believes the staff here is "playing games" and drugging her food. She demanded to speak with her daughter as soon as possible because she feels unsafe in the unit. Refusing medications. No insight into illness and need for medications. Pt does not have capacity  12/7 Patient noted to have labile mood, is focused on select staff and a male peer stating that they are stalker her and coming into and out of her room. Observed eating lunch today. Continues to refuse all medications. No acute medical concerns. Dispo planning in effect. Per evening staff pt having regular outbursts at night, not currently requiring medications  12/8 Patient remains unchanged, irritable, labile, non-compliant with medication regimen. Forgetful. No acute medical issues, is eating meals  12/9 Patient visible on the unit, attending multiple groups, appropriately interacting with peers, eating, no meds This is a 78yo  female, , retired, domiciled alone in [place], [place]. No pertinent medical hx. Patient denies psychiatric hx, but documentation indicates a history of  paranoid delusions, with one prior admission at [facility] for a stay of 2 months. She has since been non-adherent to her medication; risperidone. No history of SI/plan/attempt, no history of substance/etoh abuse. She presents to ED c/o being stalked by neighbor and son-in-law in addition to complaints of intermittent chest pain x4 months. Patient was seen at [facility] yesterday for same complaint of chest pain and released form ED Of note, pt was missing for 3 days prior to being found at [facility] [place], operated by [facility]. Admitted to [address] [place] [address] status.    Patient noted to be delusional, MMSE of 22/30 significant for cognitive impairment.     Dementia work up: included head CT, HIV, Vitb12, RPR, TSH, Folate  Risperdal 1mg qhs implemented and titrated as necessary  Results of Head CT:   ACC: [id number] EXAM:  CT BRAIN                          PROCEDURE DATE:  12/01/2022          INTERPRETATION:  PROCEDURE: CT head without intravenous contrast    INDICATION: Acute onset of psychosis    TECHNIQUE: Multiple axial images were obtained at 5 mm intervals from the   skull base to the vertex. Sagittal and coronal reformatted images were   obtained from the axial data set. The images were reviewed in brain and   bone windows.    COMPARISON: None  FINDINGS: The CT examination demonstrates is limited secondary to to   extensive streak artifact from multiple overlying metallic objects on the   patient's head. The ventricles, cisternal spaces, and cortical sulci   grossly appear within normal limits. There is no midline shift or extra   axial collections. The gray white differentiation appears within normal   limits. There is no intracranial hemorrhage or acute transcortical   infarct. There is extensive hypodensity within the periventricular white   matter which is nonspecific and may represent the sequela of small vessel   ischemic disease in this patient.    The bony windows demonstrates no fractures. The visualized paranasal   sinuses are within normal limits. The mastoid air cells are well aerated.    IMPRESSION: Limited exam secondary to extensive streak artifact. No   definite intracranial hemorrhage or acute transcortical infarct.   Extensive small vessel ischemic disease.      12/2/22: Patient is taking Risperdal 0.5mg qhs and multivitamin, however does not recall that she is taking medications. Endorsed having chest tightness/pain when discussing topic of her 'stalker', objectively does not appear to be in any distress. EKG ordered and reviewed, significant for 1st degree AV block. CTH also reviewd (see above) significant for 'small vessel ischemic disease.' No si/hi/avh or PI endorsed  12/5/22: Patient continues to refuse medications. Denies any psychiatric illness or cognitive decline, she states "there is nothing wrong with me." Denies SI/HI/AVH. Collateral obtained from daughter who shares concern for pt deteriorating cognition/memory  12/6/22- Patient's mood was very angry and irritable. She believes the staff here is "playing games" and drugging her food. She demanded to speak with her daughter as soon as possible because she feels unsafe in the unit. Refusing medications. No insight into illness and need for medications. Pt does not have capacity  12/7 Patient noted to have labile mood, is focused on select staff and a male peer stating that they are stalker her and coming into and out of her room. Observed eating lunch today. Continues to refuse all medications. No acute medical concerns. Dispo planning in effect. Per evening staff pt having regular outbursts at night, not currently requiring medications  12/8 Patient remains unchanged, irritable, labile, non-compliant with medication regimen. Forgetful. No acute medical issues, is eating meals  12/9 Patient visible on the unit, attending multiple groups, appropriately interacting with peers, eating, no meds  12/12 Patient going to multiple groups, pleasant with select peers and staff, continues to refuse all medications, remains paranoid and delusion. Will pursue TOO

## 2022-12-12 NOTE — BH INPATIENT PSYCHIATRY PROGRESS NOTE - NSBHMETABOLIC_PSY_ALL_CORE_FT
BMI: BMI (kg/m2): 21.8 (11-30-22 @ 03:08)  HbA1c: A1C with Estimated Average Glucose Result: 5.4 % (12-01-22 @ 07:21)    Glucose:   BP: 134/79 (12-12-22 @ 09:00) (134/79 - 154/77)  Lipid Panel: Date/Time: 12-01-22 @ 07:21  Cholesterol, Serum: 168  Direct LDL: --  HDL Cholesterol, Serum: 89  Total Cholesterol/HDL Ration Measurement: --  Triglycerides, Serum: 29   BMI: BMI (kg/m2): 21.8 (11-30-22 @ 03:08)  HbA1c: A1C with Estimated Average Glucose Result: 5.4 % (12-01-22 @ 07:21)    Glucose:   BP: 148/76 (12-13-22 @ 09:08) (100/66 - 154/77)  Lipid Panel: Date/Time: 12-01-22 @ 07:21  Cholesterol, Serum: 168  Direct LDL: --  HDL Cholesterol, Serum: 89  Total Cholesterol/HDL Ration Measurement: --  Triglycerides, Serum: 29

## 2022-12-13 PROCEDURE — 99232 SBSQ HOSP IP/OBS MODERATE 35: CPT

## 2022-12-13 NOTE — BH INPATIENT PSYCHIATRY PROGRESS NOTE - NSBHASSESSSUMMFT_PSY_ALL_CORE
This is a 76yo  female, , retired, domiciled alone in [place], [place]. No pertinent medical hx. Patient denies psychiatric hx, but documentation indicates a history of  paranoid delusions, with one prior admission at [facility] for a stay of 2 months. She has since been non-adherent to her medication; risperidone. No history of SI/plan/attempt, no history of substance/etoh abuse. She presents to ED c/o being stalked by neighbor and son-in-law in addition to complaints of intermittent chest pain x4 months. Patient was seen at [facility] yesterday for same complaint of chest pain and released form ED Of note, pt was missing for 3 days prior to being found at [facility]. Admitted to [address] [place] [address] status.    Patient noted to be delusional, MMSE of 22/30 significant for cognitive impairment.     Dementia work up: included head CT, HIV, Vitb12, RPR, TSH, Folate  Risperdal 1mg qhs implemented and titrated as necessary  Results of Head CT:   ACC: [id number] EXAM:  CT BRAIN                          PROCEDURE DATE:  12/01/2022          INTERPRETATION:  PROCEDURE: CT head without intravenous contrast    INDICATION: Acute onset of psychosis    TECHNIQUE: Multiple axial images were obtained at 5 mm intervals from the   skull base to the vertex. Sagittal and coronal reformatted images were   obtained from the axial data set. The images were reviewed in brain and   bone windows.    COMPARISON: None  FINDINGS: The CT examination demonstrates is limited secondary to to   extensive streak artifact from multiple overlying metallic objects on the   patient's head. The ventricles, cisternal spaces, and cortical sulci   grossly appear within normal limits. There is no midline shift or extra   axial collections. The gray white differentiation appears within normal   limits. There is no intracranial hemorrhage or acute transcortical   infarct. There is extensive hypodensity within the periventricular white   matter which is nonspecific and may represent the sequela of small vessel   ischemic disease in this patient.    The bony windows demonstrates no fractures. The visualized paranasal   sinuses are within normal limits. The mastoid air cells are well aerated.    IMPRESSION: Limited exam secondary to extensive streak artifact. No   definite intracranial hemorrhage or acute transcortical infarct.   Extensive small vessel ischemic disease.      12/2/22: Patient is taking Risperdal 0.5mg qhs and multivitamin, however does not recall that she is taking medications. Endorsed having chest tightness/pain when discussing topic of her 'stalker', objectively does not appear to be in any distress. EKG ordered and reviewed, significant for 1st degree AV block. CTH also reviewd (see above) significant for 'small vessel ischemic disease.' No si/hi/avh or PI endorsed  12/5/22: Patient continues to refuse medications. Denies any psychiatric illness or cognitive decline, she states "there is nothing wrong with me." Denies SI/HI/AVH. Collateral obtained from daughter who shares concern for pt deteriorating cognition/memory  12/6/22- Patient's mood was very angry and irritable. She believes the staff here is "playing games" and drugging her food. She demanded to speak with her daughter as soon as possible because she feels unsafe in the unit. Refusing medications. No insight into illness and need for medications. Pt does not have capacity  12/7 Patient noted to have labile mood, is focused on select staff and a male peer stating that they are stalker her and coming into and out of her room. Observed eating lunch today. Continues to refuse all medications. No acute medical concerns. Dispo planning in effect. Per evening staff pt having regular outbursts at night, not currently requiring medications  12/8 Patient remains unchanged, irritable, labile, non-compliant with medication regimen. Forgetful. No acute medical issues, is eating meals  12/9 Patient visible on the unit, attending multiple groups, appropriately interacting with peers, eating, no meds  12/12 Patient going to multiple groups, pleasant with select peers and staff, continues to refuse all medications, remains paranoid and delusion. Will pursue TOO This is a 78yo  female, , retired, domiciled alone in [place], [place]. No pertinent medical hx. Patient denies psychiatric hx, but documentation indicates a history of  paranoid delusions, with one prior admission at [facility] for a stay of 2 months. She has since been non-adherent to her medication; risperidone. No history of SI/plan/attempt, no history of substance/etoh abuse. She presents to ED c/o being stalked by neighbor and son-in-law in addition to complaints of intermittent chest pain x4 months. Patient was seen at [facility] yesterday for same complaint of chest pain and released form ED Of note, pt was missing for 3 days prior to being found at [facility]. Admitted to [address] [place] [address] status.    Patient noted to be delusional, MMSE of 22/30 significant for cognitive impairment.     Dementia work up: included head CT, HIV, Vitb12, RPR, TSH, Folate  Risperdal 1mg qhs implemented and titrated as necessary  Results of Head CT:   ACC: [id number] EXAM:  CT BRAIN                          PROCEDURE DATE:  12/01/2022          INTERPRETATION:  PROCEDURE: CT head without intravenous contrast    INDICATION: Acute onset of psychosis    TECHNIQUE: Multiple axial images were obtained at 5 mm intervals from the   skull base to the vertex. Sagittal and coronal reformatted images were   obtained from the axial data set. The images were reviewed in brain and   bone windows.    COMPARISON: None  FINDINGS: The CT examination demonstrates is limited secondary to to   extensive streak artifact from multiple overlying metallic objects on the   patient's head. The ventricles, cisternal spaces, and cortical sulci   grossly appear within normal limits. There is no midline shift or extra   axial collections. The gray white differentiation appears within normal   limits. There is no intracranial hemorrhage or acute transcortical   infarct. There is extensive hypodensity within the periventricular white   matter which is nonspecific and may represent the sequela of small vessel   ischemic disease in this patient.    The bony windows demonstrates no fractures. The visualized paranasal   sinuses are within normal limits. The mastoid air cells are well aerated.    IMPRESSION: Limited exam secondary to extensive streak artifact. No   definite intracranial hemorrhage or acute transcortical infarct.   Extensive small vessel ischemic disease.      12/2/22: Patient is taking Risperdal 0.5mg qhs and multivitamin, however does not recall that she is taking medications. Endorsed having chest tightness/pain when discussing topic of her 'stalker', objectively does not appear to be in any distress. EKG ordered and reviewed, significant for 1st degree AV block. CTH also reviewd (see above) significant for 'small vessel ischemic disease.' No si/hi/avh or PI endorsed  12/5/22: Patient continues to refuse medications. Denies any psychiatric illness or cognitive decline, she states "there is nothing wrong with me." Denies SI/HI/AVH. Collateral obtained from daughter who shares concern for pt deteriorating cognition/memory  12/6/22- Patient's mood was very angry and irritable. She believes the staff here is "playing games" and drugging her food. She demanded to speak with her daughter as soon as possible because she feels unsafe in the unit. Refusing medications. No insight into illness and need for medications. Pt does not have capacity  12/7 Patient noted to have labile mood, is focused on select staff and a male peer stating that they are stalker her and coming into and out of her room. Observed eating lunch today. Continues to refuse all medications. No acute medical concerns. Dispo planning in effect. Per evening staff pt having regular outbursts at night, not currently requiring medications  12/8 Patient remains unchanged, irritable, labile, non-compliant with medication regimen. Forgetful. No acute medical issues, is eating meals  12/9 Patient visible on the unit, attending multiple groups, appropriately interacting with peers, eating, no meds  12/12 Patient going to multiple groups, pleasant with select peers and staff, continues to refuse all medications, remains paranoid and delusion. Will pursue TOO  12/13 Patient remains non compliant with medication, attending select groups, irritable edge, continues to verbalize feeling that a select male peer is stalking and harassing her. TOO in progress

## 2022-12-13 NOTE — BH INPATIENT PSYCHIATRY PROGRESS NOTE - NSBHCHARTREVIEWVS_PSY_A_CORE FT
Vital Signs Last 24 Hrs  T(C): 36.5 (12-13-22 @ 09:08), Max: 36.9 (12-12-22 @ 17:20)  T(F): 97.7 (12-13-22 @ 09:08), Max: 98.5 (12-12-22 @ 17:20)  HR: 60 (12-13-22 @ 09:08) (60 - 79)  BP: 148/76 (12-13-22 @ 09:08) (100/66 - 148/76)  BP(mean): --  RR: 18 (12-13-22 @ 09:08) (18 - 18)  SpO2: 98% (12-13-22 @ 09:08) (98% - 99%)     Vital Signs Last 24 Hrs  T(C): 36.4 (12-13-22 @ 16:34), Max: 36.5 (12-13-22 @ 09:08)  T(F): 97.6 (12-13-22 @ 16:34), Max: 97.7 (12-13-22 @ 09:08)  HR: 110 (12-13-22 @ 16:34) (60 - 110)  BP: 123/66 (12-13-22 @ 16:34) (123/66 - 148/76)  BP(mean): --  RR: 18 (12-13-22 @ 16:34) (18 - 18)  SpO2: 97% (12-13-22 @ 16:34) (97% - 98%)

## 2022-12-13 NOTE — BH INPATIENT PSYCHIATRY PROGRESS NOTE - NSBHMETABOLIC_PSY_ALL_CORE_FT
BMI: BMI (kg/m2): 21.8 (11-30-22 @ 03:08)  HbA1c: A1C with Estimated Average Glucose Result: 5.4 % (12-01-22 @ 07:21)    Glucose:   BP: 148/76 (12-13-22 @ 09:08) (100/66 - 154/77)  Lipid Panel: Date/Time: 12-01-22 @ 07:21  Cholesterol, Serum: 168  Direct LDL: --  HDL Cholesterol, Serum: 89  Total Cholesterol/HDL Ration Measurement: --  Triglycerides, Serum: 29   BMI: BMI (kg/m2): 21.8 (11-30-22 @ 03:08)  HbA1c: A1C with Estimated Average Glucose Result: 5.4 % (12-01-22 @ 07:21)    Glucose:   BP: 123/66 (12-13-22 @ 16:34) (100/66 - 148/76)  Lipid Panel: Date/Time: 12-01-22 @ 07:21  Cholesterol, Serum: 168  Direct LDL: --  HDL Cholesterol, Serum: 89  Total Cholesterol/HDL Ration Measurement: --  Triglycerides, Serum: 29
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
## 2022-12-14 PROCEDURE — 99232 SBSQ HOSP IP/OBS MODERATE 35: CPT

## 2022-12-14 NOTE — BH INPATIENT PSYCHIATRY PROGRESS NOTE - NSBHCHARTREVIEWVS_PSY_A_CORE FT
Vital Signs Last 24 Hrs  T(C): 36.7 (12-14-22 @ 09:30), Max: 36.7 (12-14-22 @ 09:30)  T(F): 98.1 (12-14-22 @ 09:30), Max: 98.1 (12-14-22 @ 09:30)  HR: 71 (12-14-22 @ 09:30) (71 - 110)  BP: 115/63 (12-14-22 @ 09:30) (115/63 - 123/66)  BP(mean): --  RR: 18 (12-14-22 @ 09:30) (18 - 18)  SpO2: 98% (12-14-22 @ 09:30) (97% - 98%)
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
## 2022-12-14 NOTE — BH INPATIENT PSYCHIATRY PROGRESS NOTE - NSBHASSESSSUMMFT_PSY_ALL_CORE
This is a 78yo  female, , retired, domiciled alone in [place], [place]. No pertinent medical hx. Patient denies psychiatric hx, but documentation indicates a history of  paranoid delusions, with one prior admission at [facility] for a stay of 2 months. She has since been non-adherent to her medication; risperidone. No history of SI/plan/attempt, no history of substance/etoh abuse. She presents to ED c/o being stalked by neighbor and son-in-law in addition to complaints of intermittent chest pain x4 months. Patient was seen at [facility] yesterday for same complaint of chest pain and released form ED Of note, pt was missing for 3 days prior to being found at [facility]. Admitted to [address] [place] [address] status.    Patient noted to be delusional, MMSE of 22/30 significant for cognitive impairment.     Dementia work up: included head CT, HIV, Vitb12, RPR, TSH, Folate  Risperdal 1mg qhs implemented and titrated as necessary  Results of Head CT:   ACC: [id number] EXAM:  CT BRAIN                          PROCEDURE DATE:  12/01/2022          INTERPRETATION:  PROCEDURE: CT head without intravenous contrast    INDICATION: Acute onset of psychosis    TECHNIQUE: Multiple axial images were obtained at 5 mm intervals from the   skull base to the vertex. Sagittal and coronal reformatted images were   obtained from the axial data set. The images were reviewed in brain and   bone windows.    COMPARISON: None  FINDINGS: The CT examination demonstrates is limited secondary to to   extensive streak artifact from multiple overlying metallic objects on the   patient's head. The ventricles, cisternal spaces, and cortical sulci   grossly appear within normal limits. There is no midline shift or extra   axial collections. The gray white differentiation appears within normal   limits. There is no intracranial hemorrhage or acute transcortical   infarct. There is extensive hypodensity within the periventricular white   matter which is nonspecific and may represent the sequela of small vessel   ischemic disease in this patient.    The bony windows demonstrates no fractures. The visualized paranasal   sinuses are within normal limits. The mastoid air cells are well aerated.    IMPRESSION: Limited exam secondary to extensive streak artifact. No   definite intracranial hemorrhage or acute transcortical infarct.   Extensive small vessel ischemic disease.      12/2/22: Patient is taking Risperdal 0.5mg qhs and multivitamin, however does not recall that she is taking medications. Endorsed having chest tightness/pain when discussing topic of her 'stalker', objectively does not appear to be in any distress. EKG ordered and reviewed, significant for 1st degree AV block. CTH also reviewd (see above) significant for 'small vessel ischemic disease.' No si/hi/avh or PI endorsed  12/5/22: Patient continues to refuse medications. Denies any psychiatric illness or cognitive decline, she states "there is nothing wrong with me." Denies SI/HI/AVH. Collateral obtained from daughter who shares concern for pt deteriorating cognition/memory  12/6/22- Patient's mood was very angry and irritable. She believes the staff here is "playing games" and drugging her food. She demanded to speak with her daughter as soon as possible because she feels unsafe in the unit. Refusing medications. No insight into illness and need for medications. Pt does not have capacity  12/7 Patient noted to have labile mood, is focused on select staff and a male peer stating that they are stalker her and coming into and out of her room. Observed eating lunch today. Continues to refuse all medications. No acute medical concerns. Dispo planning in effect. Per evening staff pt having regular outbursts at night, not currently requiring medications  12/8 Patient remains unchanged, irritable, labile, non-compliant with medication regimen. Forgetful. No acute medical issues, is eating meals  12/9 Patient visible on the unit, attending multiple groups, appropriately interacting with peers, eating, no meds  12/12 Patient going to multiple groups, pleasant with select peers and staff, continues to refuse all medications, remains paranoid and delusion. Will pursue TOO  12/13 Patient remains non compliant with medication, attending select groups, irritable edge, continues to verbalize feeling that a select male peer is stalking and harassing her. TOO in progress This is a 78yo  female, , retired, domiciled alone in [place], [place]. No pertinent medical hx. Patient denies psychiatric hx, but documentation indicates a history of  paranoid delusions, with one prior admission at [facility] for a stay of 2 months. She has since been non-adherent to her medication; risperidone. No history of SI/plan/attempt, no history of substance/etoh abuse. She presents to ED c/o being stalked by neighbor and son-in-law in addition to complaints of intermittent chest pain x4 months. Patient was seen at [facility] yesterday for same complaint of chest pain and released form ED Of note, pt was missing for 3 days prior to being found at [facility]. Admitted to [address] [place] [address] status.    Patient noted to be delusional, MMSE of 22/30 significant for cognitive impairment.     Dementia work up: included head CT, HIV, Vitb12, RPR, TSH, Folate  Risperdal 1mg qhs implemented and titrated as necessary  Results of Head CT:   ACC: [id number] EXAM:  CT BRAIN                          PROCEDURE DATE:  12/01/2022          INTERPRETATION:  PROCEDURE: CT head without intravenous contrast    INDICATION: Acute onset of psychosis    TECHNIQUE: Multiple axial images were obtained at 5 mm intervals from the   skull base to the vertex. Sagittal and coronal reformatted images were   obtained from the axial data set. The images were reviewed in brain and   bone windows.    COMPARISON: None  FINDINGS: The CT examination demonstrates is limited secondary to to   extensive streak artifact from multiple overlying metallic objects on the   patient's head. The ventricles, cisternal spaces, and cortical sulci   grossly appear within normal limits. There is no midline shift or extra   axial collections. The gray white differentiation appears within normal   limits. There is no intracranial hemorrhage or acute transcortical   infarct. There is extensive hypodensity within the periventricular white   matter which is nonspecific and may represent the sequela of small vessel   ischemic disease in this patient.    The bony windows demonstrates no fractures. The visualized paranasal   sinuses are within normal limits. The mastoid air cells are well aerated.    IMPRESSION: Limited exam secondary to extensive streak artifact. No   definite intracranial hemorrhage or acute transcortical infarct.   Extensive small vessel ischemic disease.      12/2/22: Patient is taking Risperdal 0.5mg qhs and multivitamin, however does not recall that she is taking medications. Endorsed having chest tightness/pain when discussing topic of her 'stalker', objectively does not appear to be in any distress. EKG ordered and reviewed, significant for 1st degree AV block. CTH also reviewd (see above) significant for 'small vessel ischemic disease.' No si/hi/avh or PI endorsed  12/5/22: Patient continues to refuse medications. Denies any psychiatric illness or cognitive decline, she states "there is nothing wrong with me." Denies SI/HI/AVH. Collateral obtained from daughter who shares concern for pt deteriorating cognition/memory  12/6/22- Patient's mood was very angry and irritable. She believes the staff here is "playing games" and drugging her food. She demanded to speak with her daughter as soon as possible because she feels unsafe in the unit. Refusing medications. No insight into illness and need for medications. Pt does not have capacity  12/7 Patient noted to have labile mood, is focused on select staff and a male peer stating that they are stalker her and coming into and out of her room. Observed eating lunch today. Continues to refuse all medications. No acute medical concerns. Dispo planning in effect. Per evening staff pt having regular outbursts at night, not currently requiring medications  12/8 Patient remains unchanged, irritable, labile, non-compliant with medication regimen. Forgetful. No acute medical issues, is eating meals  12/9 Patient visible on the unit, attending multiple groups, appropriately interacting with peers, eating, no meds  12/12 Patient going to multiple groups, pleasant with select peers and staff, continues to refuse all medications, remains paranoid and delusion. Will pursue TOO  12/13 Patient remains non compliant with medication, attending select groups, irritable edge, continues to verbalize feeling that a select male peer is stalking and harassing her. TOO in progress  12/14 Patient noted to be irritable and dismissive. She is focused on going to court today for discharge. Refusing all medications, paranoid and delusional

## 2022-12-14 NOTE — BH INPATIENT PSYCHIATRY PROGRESS NOTE - NSBHMETABOLIC_PSY_ALL_CORE_FT
BMI: BMI (kg/m2): 21.8 (11-30-22 @ 03:08)  HbA1c: A1C with Estimated Average Glucose Result: 5.4 % (12-01-22 @ 07:21)    Glucose:   BP: 115/63 (12-14-22 @ 09:30) (100/66 - 148/76)  Lipid Panel: Date/Time: 12-01-22 @ 07:21  Cholesterol, Serum: 168  Direct LDL: --  HDL Cholesterol, Serum: 89  Total Cholesterol/HDL Ration Measurement: --  Triglycerides, Serum: 29

## 2022-12-15 PROCEDURE — 99232 SBSQ HOSP IP/OBS MODERATE 35: CPT

## 2022-12-15 NOTE — BH TREATMENT PLAN - NSTXOTHERINTERMD_PSY_ALL_CORE
Psychopharmacology x15 minutes, Risperdal initiated, will titrate as appropriate, TOO with MALLOY the goal will be completed.   PT and OT consult

## 2022-12-15 NOTE — BH TREATMENT PLAN - NSTXPSYCHOGOALOTHER_PSY_ALL_CORE
Patient will stabilize mood and improve on disorganized thoughts/behaviors to engage with discharge planning.
Patient will stabilize mood & alleviate sx of psychosis to engage with discharge planning.
Patient will stabilize mood and improve on disorganized thoughts/behaviors to engage with discharge planning.
Patient will stabilize mood and improve on disorganized thoughts/behaviors to engage with discharge planning.

## 2022-12-15 NOTE — BH INPATIENT PSYCHIATRY PROGRESS NOTE - NSBHFUPINTERVALHXFT_PSY_A_CORE
Patient is alert and cooperative, with appropriate eye contact. Patients thought process is tangential and perseverative. She states she is in a very good mood today because she believes court ruled in her favor yesterday. She says she had a good  that represented her and the case was dismissed in under 15 minutes. She is under the impression that she is leaving soon. She discussed how she believes the staff here is racist and dislikes black women. She has been sleeping well during the night and states her appetite is improving. Patient denies suicidal/homicidal thoughts, auditory/visual hallucinations, or self-injurious behavior. MAR reviewed: non-compliant with medications- patient does not believe she has any medical problems that require her to take medication.

## 2022-12-15 NOTE — BH INPATIENT PSYCHIATRY PROGRESS NOTE - NSBHCHARTREVIEWVS_PSY_A_CORE FT
Vital Signs Last 24 Hrs  T(C): 36.6 (12-14-22 @ 17:09), Max: 36.6 (12-14-22 @ 17:09)  T(F): 97.9 (12-14-22 @ 17:09), Max: 97.9 (12-14-22 @ 17:09)  HR: --  BP: 115/74 (12-14-22 @ 17:09) (115/74 - 115/74)  BP(mean): --  RR: 18 (12-14-22 @ 17:09) (18 - 18)  SpO2: 100% (12-14-22 @ 17:09) (100% - 100%)     Vital Signs Last 24 Hrs  T(C): 36.6 (12-16-22 @ 09:00), Max: 36.9 (12-15-22 @ 17:00)  T(F): 97.8 (12-16-22 @ 09:00), Max: 98.5 (12-15-22 @ 17:00)  HR: 67 (12-16-22 @ 09:00) (67 - 71)  BP: 121/77 (12-16-22 @ 09:00) (111/68 - 121/77)  BP(mean): --  RR: 18 (12-16-22 @ 09:00) (18 - 18)  SpO2: 99% (12-16-22 @ 09:00) (99% - 100%)

## 2022-12-15 NOTE — CHART NOTE - NSCHARTNOTEFT_GEN_A_CORE
Admitting Diagnosis:   Patient is a 77y old  Female who presents with a chief complaint of PARANOIA   (03 Dec 2022 09:04)    PAST MEDICAL & SURGICAL HISTORY: none    Current Nutrition Order: Regular diet with Ensure Enlive ONS BID (350kcal, 20gPRO per serving)      PO Intake: Good (%) [ x ]  Fair (50-75%) [   ] Poor (<25%) [   ]    GI Issues:     Pain:    Skin Integrity:    Labs:         CAPILLARY BLOOD GLUCOSE          Medications:  MEDICATIONS  (STANDING):  multivitamin 1 Tablet(s) Oral daily  risperiDONE   Tablet 1 milliGRAM(s) Oral at bedtime    MEDICATIONS  (PRN):  acetaminophen     Tablet .. 650 milliGRAM(s) Oral every 6 hours PRN Moderate Pain (4 - 6)  aluminum hydroxide/magnesium hydroxide/simethicone Suspension 30 milliLiter(s) Oral every 4 hours PRN Dyspepsia  magnesium hydroxide Suspension 30 milliLiter(s) Oral daily PRN Constipation  OLANZapine 2.5 milliGRAM(s) Oral every 8 hours PRN agitation  traZODone 50 milliGRAM(s) Oral at bedtime PRN insomnia      Weight:  Daily     Daily     Weight Change:     Nutrition Focused Physical Exam: Completed [   ]  Not Pertinent [   ]  Muscle Wasting- Temporal [   ]  Clavicle/Pectoral [   ]  Shoulder/Deltoid [   ]  Scapula [   ]  Interosseous [   ]  Quadriceps [   ]  Gastrocnemius [   ]  Fat Wasting- Orbital [   ]  Buccal [   ]  Triceps [   ]  Rib [   ]  Suspect [PCM] 2/2 to physical assessment, [poor intake], and [wt loss]; please see malnutrition chart note.    Estimated energy needs:     Subjective:     Previous Nutrition Diagnosis:    Active [   ]  Resolved [   ]    If resolved, new PES:     Goal:    Recommendations:    Education:     Risk Level: High [   ] Moderate [   ] Low [   ] Admitting Diagnosis:   Patient is a 77y old  Female who presents with a chief complaint of PARANOIA   (03 Dec 2022 09:04)    PAST MEDICAL & SURGICAL HISTORY: none    Current Nutrition Order: Regular diet with Ensure Enlive ONS BID (350kcal, 20gPRO per serving)      PO Intake: Good (%) [ x ]  Fair (50-75%) [   ] Poor (<25%) [   ]    GI Issues: none noted by pt; no c/o N/V/D/C; most recent BM yesterday per pt statements    Pain: denies pain/discomfort per chart     Skin Integrity: within normal limits at this time per chart; no notes r/t edematous status; Celestino: 21    Labs:     CAPILLARY BLOOD GLUCOSE    Medications:  MEDICATIONS  (STANDING):  multivitamin 1 Tablet(s) Oral daily  risperiDONE   Tablet 1 milliGRAM(s) Oral at bedtime    MEDICATIONS  (PRN):  acetaminophen     Tablet .. 650 milliGRAM(s) Oral every 6 hours PRN Moderate Pain (4 - 6)  aluminum hydroxide/magnesium hydroxide/simethicone Suspension 30 milliLiter(s) Oral every 4 hours PRN Dyspepsia  magnesium hydroxide Suspension 30 milliLiter(s) Oral daily PRN Constipation  OLANZapine 2.5 milliGRAM(s) Oral every 8 hours PRN agitation  traZODone 50 milliGRAM(s) Oral at bedtime PRN insomnia    Anthropometrics on admisison:   Height for BMI (FEET)	5 Feet  Height for BMI (INCHES)	2 Inch(s)  Height for BMI (CM)	157.48 Centimeter(s)  Weight for BMI (lbs)	119 lb  Weight for BMI (kg)	54 kg  Body Mass Index	              21.7    Weight Change: new weight noted on 12/06/22 in EMR: 57.2kg (126#); this wt increase of 7# is noteworthy    please refer to initial nutrition assessment on 12/03/22    Estimated energy needs:   Weight used for calculations	current weight  Estimated Energy Needs Weight (lbs)	126 lb  Estimated Energy Needs Weight (kg)	57.2 kg  kcal: 23-28kcal/kg = 1316-1602kcal/day   protein: 1.2-1.4g/kg = 69-80g/day   fluids: 30-35mL/kg = 1716-2002mL/day     Other Calculations	%IBW; 115; ABW used to calculate estimated needs d/t pt being between 80 and 120% IBW. Adjusted for risk for PCM, clinical judgment.    Subjective: 76yo  female, , retired, domiciled alone in [place], [place]. No pertinent medical hx. Patient denies psychiatric hx, but documentation indicates a history of  paranoid delusions, with one prior admission at [facility] for a stay of 2 months. Pt presented to ED with c/o chest painx4 months, and being stalked by neighbor and son-in-law. Patient was seen at [facility] yesterday for same complaint of chest pain and released. Of note, pt was missing for 3 days prior to being found at [facility]. Admitted to [address] [place] [address] status.     Pt seen on 4U at bedside. As per pt, appetite fluctuates but appetite is currently fair to good. Pt endorsed ~75% PO consumption on this date, ~75% PO intakes overall. Pt stated she generally enjoys drinking the Ensure ONS BID. GI: no c/o N/V/D/C; most recent BM on 12/06/22 per pt. Skin Integrity: within normal limits at this time per chart; no notes r/t edematous status; Celestino: 21. Denies pain/discomfort per flowsheets data. Labs reviewed: elevated AST (42), low eGFR (57, 55); RD will continue to monitor trends. Age-related muscle and fat loss remain per RD observation. No additional nutrition-related concerns. RD will remain available. Additional nutrition recommendations listed below to follow.    Previous Nutrition Diagnosis: Food & Nutrition Related Knowledge Deficit r/t lacking formal diet edu PTA AEB pt's questions about "fasting", questions about macronutrients    Active [ x ]  Resolved [   ]    Goal: Pt to consistently meet at least 75% of EEE via tolerated route that is consistent with GOC during hospital stay;     Recommendations:  1. Continue with Regular diet and Ensure Enlive ONS.  2. Monitor %PO intake, diet tolerance.   >>Encourage PO intake as appropriate   3. Monitor lytes, replete prn. Monitor BG.   4. Trend weekly wts. Monitor skin integrity, s/sx GI distress.   5. Pain/BM regimen per team   6. RD diet edu reinforcement prn.   7. RD to remain available for additional nutrition interventions as needed.    Education: RD promoted increased PO intakes as able, suggested small, frequent meals when appetite is decreased, emphasized the intakes of nutrient-dense foods. Pt was relatively receptive, verbalized understanding.     Risk Level: High [   ] Moderate [ x ] Low [   ]. Admitting Diagnosis:   Patient is a 77y old  Female who presents with a chief complaint of PARANOIA   (03 Dec 2022 09:04)    PAST MEDICAL & SURGICAL HISTORY: none    Current Nutrition Order: Regular diet with Ensure Enlive ONS BID (350kcal, 20gPRO per serving)      PO Intake: Good (%) [ x ]  Fair (50-75%) [   ] Poor (<25%) [   ]    GI Issues: none noted by pt; no c/o N/V/D/C; most recent BM yesterday per pt statements    Pain: denies pain/discomfort per chart     Skin Integrity: within normal limits at this time per chart; no notes r/t edematous status; Celestino: 21    Labs:     CAPILLARY BLOOD GLUCOSE    Medications:  MEDICATIONS  (STANDING):  multivitamin 1 Tablet(s) Oral daily  risperiDONE   Tablet 1 milliGRAM(s) Oral at bedtime    MEDICATIONS  (PRN):  acetaminophen     Tablet .. 650 milliGRAM(s) Oral every 6 hours PRN Moderate Pain (4 - 6)  aluminum hydroxide/magnesium hydroxide/simethicone Suspension 30 milliLiter(s) Oral every 4 hours PRN Dyspepsia  magnesium hydroxide Suspension 30 milliLiter(s) Oral daily PRN Constipation  OLANZapine 2.5 milliGRAM(s) Oral every 8 hours PRN agitation  traZODone 50 milliGRAM(s) Oral at bedtime PRN insomnia    Anthropometrics on admisison:   Height for BMI (FEET)	5 Feet  Height for BMI (INCHES)	2 Inch(s)  Height for BMI (CM)	157.48 Centimeter(s)  Weight for BMI (lbs)	119 lb  Weight for BMI (kg)	54 kg  Body Mass Index	              21.7    Weight Change: new weight noted on 12/06/22 in EMR: 57.2kg (126#); this wt increase of 7# is noteworthy    please refer to initial nutrition assessment on 12/03/22    Estimated energy needs:   Weight used for calculations	current weight  Estimated Energy Needs Weight (lbs)	126 lb  Estimated Energy Needs Weight (kg)	57.2 kg  kcal: 23-28kcal/kg = 1316-1602kcal/day   protein: 1.2-1.4g/kg = 69-80g/day   fluids: 30-35mL/kg = 1716-2002mL/day     Other Calculations	%IBW; 115; ABW used to calculate estimated needs d/t pt being between 80 and 120% IBW. Adjusted for risk for PCM, clinical judgment.    Subjective: 76yo  female, , retired, domiciled alone in [place], [place]. No pertinent medical hx. Patient denies psychiatric hx, but documentation indicates a history of  paranoid delusions, with one prior admission at [facility] for a stay of 2 months. Pt presented to ED with c/o chest painx4 months, and being stalked by neighbor and son-in-law. Patient was seen at [facility] yesterday for same complaint of chest pain and released. Of note, pt was missing for 3 days prior to being found at [facility]. Admitted to [address] [place] [address] status.     Pt seen on 4U at bedside. As per pt, appetite fluctuates but appetite is currently fair to good. Pt endorsed ~75% PO consumption on this date, ~75% PO intakes overall. Pt stated she generally enjoys drinking the Ensure ONS BID. GI: no c/o N/V/D/C; most recent BM on 12/06/22 per pt. Skin Integrity: within normal limits at this time per chart; no notes r/t edematous status; Celestino: 21. Denies pain/discomfort per flowsheets data. Labs reviewed: elevated AST (42), low eGFR (57, 55); RD will continue to monitor trends. Age-related muscle and fat loss remain per RD observation. Pt continues to receive MVI supplementation. No additional nutrition-related concerns. RD will remain available. Additional nutrition recommendations listed below to follow.    Previous Nutrition Diagnosis: Food & Nutrition Related Knowledge Deficit r/t lacking formal diet edu PTA AEB pt's questions about "fasting", questions about macronutrients    Active [ x ]  Resolved [   ]    Goal: Pt to consistently meet at least 75% of EEE via tolerated route that is consistent with GOC during hospital stay;     Recommendations:  1. Continue with Regular diet and Ensure Enlive ONS.  2. Monitor %PO intake, diet tolerance.   >>Encourage PO intake as appropriate   3. Monitor lytes, replete prn. Monitor BG.   4. Trend weekly wts. Monitor skin integrity, s/sx GI distress.   5. Pain/BM regimen per team   6. RD diet edu reinforcement prn.   7. RD to remain available for additional nutrition interventions as needed.    Education: RD promoted increased PO intakes as able, suggested small, frequent meals when appetite is decreased, emphasized the intakes of nutrient-dense foods. Pt was relatively receptive, verbalized understanding.     Risk Level: High [   ] Moderate [ x ] Low [   ].

## 2022-12-15 NOTE — BH TREATMENT PLAN - NSTXDISORGINTERMD_PSY_ALL_CORE
Psychopharmacology x15 minutes, Risperdal initiated, will titrate as appropriate, TOO with the goal of MALLOY will be completed.

## 2022-12-15 NOTE — BH TREATMENT PLAN - NSTXPSYCHOINTERMD_PSY_ALL_CORE
Psychopharmacology x15 minutes, Risperdal initiated, will titrate as appropriate,TOO with the goal of MALLOY will be completed.

## 2022-12-15 NOTE — BH INPATIENT PSYCHIATRY PROGRESS NOTE - NSBHMETABOLIC_PSY_ALL_CORE_FT
BMI: BMI (kg/m2): 21.8 (11-30-22 @ 03:08)  HbA1c: A1C with Estimated Average Glucose Result: 5.4 % (12-01-22 @ 07:21)    Glucose:   BP: 115/74 (12-14-22 @ 17:09) (100/66 - 148/76)  Lipid Panel: Date/Time: 12-01-22 @ 07:21  Cholesterol, Serum: 168  Direct LDL: --  HDL Cholesterol, Serum: 89  Total Cholesterol/HDL Ration Measurement: --  Triglycerides, Serum: 29   BMI: BMI (kg/m2): 21.8 (11-30-22 @ 03:08)  HbA1c: A1C with Estimated Average Glucose Result: 5.4 % (12-01-22 @ 07:21)    Glucose:   BP: 121/77 (12-16-22 @ 09:00) (111/68 - 140/74)  Lipid Panel: Date/Time: 12-01-22 @ 07:21  Cholesterol, Serum: 168  Direct LDL: --  HDL Cholesterol, Serum: 89  Total Cholesterol/HDL Ration Measurement: --  Triglycerides, Serum: 29

## 2022-12-15 NOTE — BH INPATIENT PSYCHIATRY PROGRESS NOTE - NSBHASSESSSUMMFT_PSY_ALL_CORE
This is a 78yo  female, , retired, domiciled alone in [place], [place]. No pertinent medical hx. Patient denies psychiatric hx, but documentation indicates a history of  paranoid delusions, with one prior admission at [facility] for a stay of 2 months. She has since been non-adherent to her medication; risperidone. No history of SI/plan/attempt, no history of substance/etoh abuse. She presents to ED c/o being stalked by neighbor and son-in-law in addition to complaints of intermittent chest pain x4 months. Patient was seen at [facility] [place] operated by [facility] yesterday for same complaint of chest pain and released form ED Of note, pt was missing for 3 days prior to being found at [facility]. Admitted to [address] [place] [address] status.    Patient noted to be delusional, MMSE of 22/30 significant for cognitive impairment.     Dementia work up: included head CT, HIV, Vitb12, RPR, TSH, Folate  Risperdal 1mg qhs implemented and titrated as necessary  Results of Head CT:   ACC: [id number] EXAM:  CT BRAIN                          PROCEDURE DATE:  12/01/2022          INTERPRETATION:  PROCEDURE: CT head without intravenous contrast    INDICATION: Acute onset of psychosis    TECHNIQUE: Multiple axial images were obtained at 5 mm intervals from the   skull base to the vertex. Sagittal and coronal reformatted images were   obtained from the axial data set. The images were reviewed in brain and   bone windows.    COMPARISON: None  FINDINGS: The CT examination demonstrates is limited secondary to to   extensive streak artifact from multiple overlying metallic objects on the   patient's head. The ventricles, cisternal spaces, and cortical sulci   grossly appear within normal limits. There is no midline shift or extra   axial collections. The gray white differentiation appears within normal   limits. There is no intracranial hemorrhage or acute transcortical   infarct. There is extensive hypodensity within the periventricular white   matter which is nonspecific and may represent the sequela of small vessel   ischemic disease in this patient.    The bony windows demonstrates no fractures. The visualized paranasal   sinuses are within normal limits. The mastoid air cells are well aerated.    IMPRESSION: Limited exam secondary to extensive streak artifact. No   definite intracranial hemorrhage or acute transcortical infarct.   Extensive small vessel ischemic disease.      12/2/22: Patient is taking Risperdal 0.5mg qhs and multivitamin, however does not recall that she is taking medications. Endorsed having chest tightness/pain when discussing topic of her 'stalker', objectively does not appear to be in any distress. EKG ordered and reviewed, significant for 1st degree AV block. CTH also reviewd (see above) significant for 'small vessel ischemic disease.' No si/hi/avh or PI endorsed  12/5/22: Patient continues to refuse medications. Denies any psychiatric illness or cognitive decline, she states "there is nothing wrong with me." Denies SI/HI/AVH. Collateral obtained from daughter who shares concern for pt deteriorating cognition/memory  12/6/22- Patient's mood was very angry and irritable. She believes the staff here is "playing games" and drugging her food. She demanded to speak with her daughter as soon as possible because she feels unsafe in the unit. Refusing medications. No insight into illness and need for medications. Pt does not have capacity  12/7 Patient noted to have labile mood, is focused on select staff and a male peer stating that they are stalker her and coming into and out of her room. Observed eating lunch today. Continues to refuse all medications. No acute medical concerns. Dispo planning in effect. Per evening staff pt having regular outbursts at night, not currently requiring medications  12/8 Patient remains unchanged, irritable, labile, non-compliant with medication regimen. Forgetful. No acute medical issues, is eating meals  12/9 Patient visible on the unit, attending multiple groups, appropriately interacting with peers, eating, no meds  12/12 Patient going to multiple groups, pleasant with select peers and staff, continues to refuse all medications, remains paranoid and delusion. Will pursue TOO  12/13 Patient remains non compliant with medication, attending select groups, irritable edge, continues to verbalize feeling that a select male peer is stalking and harassing her. TOO in progress  12/14 Patient noted to be irritable and dismissive. She is focused on going to court today for discharge. Refusing all medications, paranoid and delusional This is a 78yo  female, , retired, domiciled alone in [place], [place]. No pertinent medical hx. Patient denies psychiatric hx, but documentation indicates a history of  paranoid delusions, with one prior admission at [facility] for a stay of 2 months. She has since been non-adherent to her medication; risperidone. No history of SI/plan/attempt, no history of substance/etoh abuse. She presents to ED c/o being stalked by neighbor and son-in-law in addition to complaints of intermittent chest pain x4 months. Patient was seen at [facility] yesterday for same complaint of chest pain and released form ED Of note, pt was missing for 3 days prior to being found at [facility]. Admitted to [address] [place] [address] status.    Patient noted to be delusional, MMSE of 22/30 significant for cognitive impairment.     Dementia work up: included head CT, HIV, Vitb12, RPR, TSH, Folate  Risperdal 1mg qhs implemented and titrated as necessary  Results of Head CT:   ACC: [id number] EXAM:  CT BRAIN                          PROCEDURE DATE:  12/01/2022          INTERPRETATION:  PROCEDURE: CT head without intravenous contrast    INDICATION: Acute onset of psychosis    TECHNIQUE: Multiple axial images were obtained at 5 mm intervals from the   skull base to the vertex. Sagittal and coronal reformatted images were   obtained from the axial data set. The images were reviewed in brain and   bone windows.    COMPARISON: None  FINDINGS: The CT examination demonstrates is limited secondary to to   extensive streak artifact from multiple overlying metallic objects on the   patient's head. The ventricles, cisternal spaces, and cortical sulci   grossly appear within normal limits. There is no midline shift or extra   axial collections. The gray white differentiation appears within normal   limits. There is no intracranial hemorrhage or acute transcortical   infarct. There is extensive hypodensity within the periventricular white   matter which is nonspecific and may represent the sequela of small vessel   ischemic disease in this patient.    The bony windows demonstrates no fractures. The visualized paranasal   sinuses are within normal limits. The mastoid air cells are well aerated.    IMPRESSION: Limited exam secondary to extensive streak artifact. No   definite intracranial hemorrhage or acute transcortical infarct.   Extensive small vessel ischemic disease.      12/2/22: Patient is taking Risperdal 0.5mg qhs and multivitamin, however does not recall that she is taking medications. Endorsed having chest tightness/pain when discussing topic of her 'stalker', objectively does not appear to be in any distress. EKG ordered and reviewed, significant for 1st degree AV block. CTH also reviewd (see above) significant for 'small vessel ischemic disease.' No si/hi/avh or PI endorsed  12/5/22: Patient continues to refuse medications. Denies any psychiatric illness or cognitive decline, she states "there is nothing wrong with me." Denies SI/HI/AVH. Collateral obtained from daughter who shares concern for pt deteriorating cognition/memory  12/6/22- Patient's mood was very angry and irritable. She believes the staff here is "playing games" and drugging her food. She demanded to speak with her daughter as soon as possible because she feels unsafe in the unit. Refusing medications. No insight into illness and need for medications. Pt does not have capacity  12/7 Patient noted to have labile mood, is focused on select staff and a male peer stating that they are stalker her and coming into and out of her room. Observed eating lunch today. Continues to refuse all medications. No acute medical concerns. Dispo planning in effect. Per evening staff pt having regular outbursts at night, not currently requiring medications  12/8 Patient remains unchanged, irritable, labile, non-compliant with medication regimen. Forgetful. No acute medical issues, is eating meals  12/9 Patient visible on the unit, attending multiple groups, appropriately interacting with peers, eating, no meds  12/12 Patient going to multiple groups, pleasant with select peers and staff, continues to refuse all medications, remains paranoid and delusion. Will pursue TOO  12/13 Patient remains non compliant with medication, attending select groups, irritable edge, continues to verbalize feeling that a select male peer is stalking and harassing her. TOO in progress  12/14 Patient noted to be irritable and dismissive. She is focused on going to court today for discharge. Refusing all medications, paranoid and delusional  12/15 Patient did go to court yesterday for her release. Retention granted to the hospital. TOO in progress

## 2022-12-16 PROCEDURE — 99232 SBSQ HOSP IP/OBS MODERATE 35: CPT

## 2022-12-16 NOTE — BH INPATIENT PSYCHIATRY PROGRESS NOTE - NSBHMETABOLIC_PSY_ALL_CORE_FT
BMI: BMI (kg/m2): 21.8 (11-30-22 @ 03:08)  HbA1c: A1C with Estimated Average Glucose Result: 5.4 % (12-01-22 @ 07:21)    Glucose:   BP: 121/77 (12-16-22 @ 09:00) (111/68 - 140/74)  Lipid Panel: Date/Time: 12-01-22 @ 07:21  Cholesterol, Serum: 168  Direct LDL: --  HDL Cholesterol, Serum: 89  Total Cholesterol/HDL Ration Measurement: --  Triglycerides, Serum: 29

## 2022-12-16 NOTE — BH INPATIENT PSYCHIATRY PROGRESS NOTE - NSBHFUPINTERVALHXFT_PSY_A_CORE
Patient remains visible on the unit, seen attending multiple groups, walking around the unit and engaging with peers and staff without issue. Cooperative on approach, but appears irritable, suspicious and guarded. Discharge focused and maintaining that she is going to get a  to troy the hospital. Continues to refuse all medications. Treatment over objection in progress.   Sleep and appetite are fair.

## 2022-12-16 NOTE — BH INPATIENT PSYCHIATRY PROGRESS NOTE - NSBHASSESSSUMMFT_PSY_ALL_CORE
This is a 76yo  female, , retired, domiciled alone in [place], [place]. No pertinent medical hx. Patient denies psychiatric hx, but documentation indicates a history of  paranoid delusions, with one prior admission at [facility] for a stay of 2 months. She has since been non-adherent to her medication; risperidone. No history of SI/plan/attempt, no history of substance/etoh abuse. She presents to ED c/o being stalked by neighbor and son-in-law in addition to complaints of intermittent chest pain x4 months. Patient was seen at [facility] yesterday for same complaint of chest pain and released form ED Of note, pt was missing for 3 days prior to being found at [facility]. Admitted to [address] [place] [address] status.    Patient noted to be delusional, MMSE of 22/30 significant for cognitive impairment.     Dementia work up: included head CT, HIV, Vitb12, RPR, TSH, Folate  Risperdal 1mg qhs implemented and titrated as necessary  Results of Head CT:   ACC: [id number] EXAM:  CT BRAIN                          PROCEDURE DATE:  12/01/2022          INTERPRETATION:  PROCEDURE: CT head without intravenous contrast    INDICATION: Acute onset of psychosis    TECHNIQUE: Multiple axial images were obtained at 5 mm intervals from the   skull base to the vertex. Sagittal and coronal reformatted images were   obtained from the axial data set. The images were reviewed in brain and   bone windows.    COMPARISON: None  FINDINGS: The CT examination demonstrates is limited secondary to to   extensive streak artifact from multiple overlying metallic objects on the   patient's head. The ventricles, cisternal spaces, and cortical sulci   grossly appear within normal limits. There is no midline shift or extra   axial collections. The gray white differentiation appears within normal   limits. There is no intracranial hemorrhage or acute transcortical   infarct. There is extensive hypodensity within the periventricular white   matter which is nonspecific and may represent the sequela of small vessel   ischemic disease in this patient.    The bony windows demonstrates no fractures. The visualized paranasal   sinuses are within normal limits. The mastoid air cells are well aerated.    IMPRESSION: Limited exam secondary to extensive streak artifact. No   definite intracranial hemorrhage or acute transcortical infarct.   Extensive small vessel ischemic disease.      12/2/22: Patient is taking Risperdal 0.5mg qhs and multivitamin, however does not recall that she is taking medications. Endorsed having chest tightness/pain when discussing topic of her 'stalker', objectively does not appear to be in any distress. EKG ordered and reviewed, significant for 1st degree AV block. CTH also reviewd (see above) significant for 'small vessel ischemic disease.' No si/hi/avh or PI endorsed  12/5/22: Patient continues to refuse medications. Denies any psychiatric illness or cognitive decline, she states "there is nothing wrong with me." Denies SI/HI/AVH. Collateral obtained from daughter who shares concern for pt deteriorating cognition/memory  12/6/22- Patient's mood was very angry and irritable. She believes the staff here is "playing games" and drugging her food. She demanded to speak with her daughter as soon as possible because she feels unsafe in the unit. Refusing medications. No insight into illness and need for medications. Pt does not have capacity  12/7 Patient noted to have labile mood, is focused on select staff and a male peer stating that they are stalker her and coming into and out of her room. Observed eating lunch today. Continues to refuse all medications. No acute medical concerns. Dispo planning in effect. Per evening staff pt having regular outbursts at night, not currently requiring medications  12/8 Patient remains unchanged, irritable, labile, non-compliant with medication regimen. Forgetful. No acute medical issues, is eating meals  12/9 Patient visible on the unit, attending multiple groups, appropriately interacting with peers, eating, no meds  12/12 Patient going to multiple groups, pleasant with select peers and staff, continues to refuse all medications, remains paranoid and delusion. Will pursue TOO  12/13 Patient remains non compliant with medication, attending select groups, irritable edge, continues to verbalize feeling that a select male peer is stalking and harassing her. TOO in progress  12/14 Patient noted to be irritable and dismissive. She is focused on going to court today for discharge. Refusing all medications, paranoid and delusional  12/15 Patient did go to court yesterday for her release. Retention granted to the hospital. TOO in progress  12/16 Suspicious, guarded, refusing medications, visible on the unit, going to groups.

## 2022-12-16 NOTE — BH INPATIENT PSYCHIATRY PROGRESS NOTE - NSBHCHARTREVIEWVS_PSY_A_CORE FT
Vital Signs Last 24 Hrs  T(C): 36.6 (12-16-22 @ 09:00), Max: 36.9 (12-15-22 @ 17:00)  T(F): 97.8 (12-16-22 @ 09:00), Max: 98.5 (12-15-22 @ 17:00)  HR: 67 (12-16-22 @ 09:00) (67 - 71)  BP: 121/77 (12-16-22 @ 09:00) (111/68 - 121/77)  BP(mean): --  RR: 18 (12-16-22 @ 09:00) (18 - 18)  SpO2: 99% (12-16-22 @ 09:00) (99% - 100%)

## 2022-12-19 PROCEDURE — 99232 SBSQ HOSP IP/OBS MODERATE 35: CPT

## 2022-12-19 NOTE — BH INPATIENT PSYCHIATRY PROGRESS NOTE - NSBHFUPINTERVALHXFT_PSY_A_CORE
Patient visible on the unit, seen standing my unit phones, states that she is trying to make phone calls, but on close observation, appears confused and not knowing how to dial on the phones, frequently pressing a couple numbers, then hanging up. Irritable on approach 'I'm not okay this is ridiculous, I'm being held here against my will.' She verbalizes intent to call her  to troy the hospital. Continues to refuse all medications, insight/judgment poor. Seen eating lunch. Per staff pt remains the same, slept after 0100 this morning

## 2022-12-19 NOTE — BH INPATIENT PSYCHIATRY PROGRESS NOTE - NSBHASSESSSUMMFT_PSY_ALL_CORE
This is a 78yo  female, , retired, domiciled alone in [place], [place]. No pertinent medical hx. Patient denies psychiatric hx, but documentation indicates a history of  paranoid delusions, with one prior admission at [facility] for a stay of 2 months. She has since been non-adherent to her medication; risperidone. No history of SI/plan/attempt, no history of substance/etoh abuse. She presents to ED c/o being stalked by neighbor and son-in-law in addition to complaints of intermittent chest pain x4 months. Patient was seen at [facility] yesterday for same complaint of chest pain and released form ED Of note, pt was missing for 3 days prior to being found at [facility]. Admitted to [address] [place] [address] status.    Patient noted to be delusional, MMSE of 22/30 significant for cognitive impairment.     Dementia work up: included head CT, HIV, Vitb12, RPR, TSH, Folate  Risperdal 1mg qhs implemented and titrated as necessary  Results of Head CT:   ACC: [id number] EXAM:  CT BRAIN                          PROCEDURE DATE:  12/01/2022          INTERPRETATION:  PROCEDURE: CT head without intravenous contrast    INDICATION: Acute onset of psychosis    TECHNIQUE: Multiple axial images were obtained at 5 mm intervals from the   skull base to the vertex. Sagittal and coronal reformatted images were   obtained from the axial data set. The images were reviewed in brain and   bone windows.    COMPARISON: None  FINDINGS: The CT examination demonstrates is limited secondary to to   extensive streak artifact from multiple overlying metallic objects on the   patient's head. The ventricles, cisternal spaces, and cortical sulci   grossly appear within normal limits. There is no midline shift or extra   axial collections. The gray white differentiation appears within normal   limits. There is no intracranial hemorrhage or acute transcortical   infarct. There is extensive hypodensity within the periventricular white   matter which is nonspecific and may represent the sequela of small vessel   ischemic disease in this patient.    The bony windows demonstrates no fractures. The visualized paranasal   sinuses are within normal limits. The mastoid air cells are well aerated.    IMPRESSION: Limited exam secondary to extensive streak artifact. No   definite intracranial hemorrhage or acute transcortical infarct.   Extensive small vessel ischemic disease.      12/2/22: Patient is taking Risperdal 0.5mg qhs and multivitamin, however does not recall that she is taking medications. Endorsed having chest tightness/pain when discussing topic of her 'stalker', objectively does not appear to be in any distress. EKG ordered and reviewed, significant for 1st degree AV block. CTH also reviewd (see above) significant for 'small vessel ischemic disease.' No si/hi/avh or PI endorsed  12/5/22: Patient continues to refuse medications. Denies any psychiatric illness or cognitive decline, she states "there is nothing wrong with me." Denies SI/HI/AVH. Collateral obtained from daughter who shares concern for pt deteriorating cognition/memory  12/6/22- Patient's mood was very angry and irritable. She believes the staff here is "playing games" and drugging her food. She demanded to speak with her daughter as soon as possible because she feels unsafe in the unit. Refusing medications. No insight into illness and need for medications. Pt does not have capacity  12/7 Patient noted to have labile mood, is focused on select staff and a male peer stating that they are stalker her and coming into and out of her room. Observed eating lunch today. Continues to refuse all medications. No acute medical concerns. Dispo planning in effect. Per evening staff pt having regular outbursts at night, not currently requiring medications  12/8 Patient remains unchanged, irritable, labile, non-compliant with medication regimen. Forgetful. No acute medical issues, is eating meals  12/9 Patient visible on the unit, attending multiple groups, appropriately interacting with peers, eating, no meds  12/12 Patient going to multiple groups, pleasant with select peers and staff, continues to refuse all medications, remains paranoid and delusion. Will pursue TOO  12/13 Patient remains non compliant with medication, attending select groups, irritable edge, continues to verbalize feeling that a select male peer is stalking and harassing her. TOO in progress  12/14 Patient noted to be irritable and dismissive. She is focused on going to court today for discharge. Refusing all medications, paranoid and delusional  12/15 Patient did go to court yesterday for her release. Retention granted to the hospital. TOO in progress  12/16 Suspicious, guarded, refusing medications, visible on the unit, going to groups.  12/19 Irritable, confused, TOO in progress, visible on the unit

## 2022-12-19 NOTE — BH PSYCHOLOGY - CLINICIAN PSYCHOTHERAPY NOTE - NSBHPSYCHOLADDL_PSY_A_CORE
This is a 76yo  female, , retired, domiciled alone in [place], [place]. No pertinent medical hx. Patient denies psychiatric hx, but documentation indicates a history of  paranoid delusions, with one prior admission at [facility] for a stay of 2 months. She has since been non-adherent to her medication; risperidone. No history of SI/plan/attempt, no history of substance/etoh abuse. She presents to ED c/o being stalked by neighbor and son-in-law in addition to complaints of intermittent chest pain x4 months.

## 2022-12-19 NOTE — BH PSYCHOLOGY - CLINICIAN PSYCHOTHERAPY NOTE - NSTXPSYCHOGOALOTHER_PSY_ALL_CORE
Patient will stabilize mood and improve on disorganized thoughts/behaviors to engage with discharge planning.

## 2022-12-19 NOTE — BH INPATIENT PSYCHIATRY PROGRESS NOTE - NSBHMETABOLIC_PSY_ALL_CORE_FT
BMI: BMI (kg/m2): 21.8 (11-30-22 @ 03:08)  HbA1c: A1C with Estimated Average Glucose Result: 5.4 % (12-01-22 @ 07:21)    Glucose:   BP: 146/63 (12-19-22 @ 08:20) (118/60 - 146/63)  Lipid Panel: Date/Time: 12-01-22 @ 07:21  Cholesterol, Serum: 168  Direct LDL: --  HDL Cholesterol, Serum: 89  Total Cholesterol/HDL Ration Measurement: --  Triglycerides, Serum: 29

## 2022-12-19 NOTE — BH INPATIENT PSYCHIATRY PROGRESS NOTE - NSTXPSYCHOGOALOTHER_PSY_ALL_CORE
Patient will stabilize mood and improve on disorganized thoughts/behaviors to engage with discharge planning.
Patient will stabilize mood & alleviate sx of psychosis to engage with discharge planning.
Patient will stabilize mood and improve on disorganized thoughts/behaviors to engage with discharge planning.
Patient will stabilize mood & alleviate sx of psychosis to engage with discharge planning.
Patient will stabilize mood and improve on disorganized thoughts/behaviors to engage with discharge planning.
Patient will stabilize mood & alleviate sx of psychosis to engage with discharge planning.
Patient will stabilize mood and improve on disorganized thoughts/behaviors to engage with discharge planning.
Patient will stabilize mood and improve on disorganized thoughts/behaviors to engage with discharge planning.
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
## 2022-12-19 NOTE — BH PSYCHOLOGY - CLINICIAN PSYCHOTHERAPY NOTE - TOKEN PULL-DIAGNOSIS
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

## 2022-12-19 NOTE — BH INPATIENT PSYCHIATRY PROGRESS NOTE - NSBHCHARTREVIEWVS_PSY_A_CORE FT
Vital Signs Last 24 Hrs  T(C): 36.7 (12-19-22 @ 08:20), Max: 36.9 (12-18-22 @ 17:00)  T(F): 98.1 (12-19-22 @ 08:20), Max: 98.5 (12-18-22 @ 17:00)  HR: 67 (12-19-22 @ 08:20) (65 - 67)  BP: 146/63 (12-19-22 @ 08:20) (118/60 - 146/63)  BP(mean): --  RR: 18 (12-19-22 @ 08:20) (18 - 18)  SpO2: 100% (12-19-22 @ 08:20) (97% - 100%)
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
## 2022-12-20 PROCEDURE — 99232 SBSQ HOSP IP/OBS MODERATE 35: CPT

## 2022-12-20 NOTE — BH INPATIENT PSYCHIATRY PROGRESS NOTE - NSBHCHARTREVIEWVS_PSY_A_CORE FT
Vital Signs Last 24 Hrs  T(C): 36.4 (12-21-22 @ 09:00), Max: 36.7 (12-20-22 @ 16:51)  T(F): 97.5 (12-21-22 @ 09:00), Max: 98.1 (12-20-22 @ 16:51)  HR: 58 (12-21-22 @ 09:00) (58 - 93)  BP: 133/70 (12-21-22 @ 09:00) (103/66 - 133/70)  BP(mean): --  RR: 18 (12-21-22 @ 09:00) (18 - 18)  SpO2: 100% (12-21-22 @ 09:00) (98% - 100%)

## 2022-12-20 NOTE — BH INPATIENT PSYCHIATRY PROGRESS NOTE - NSBHMETABOLIC_PSY_ALL_CORE_FT
BMI: BMI (kg/m2): 21.8 (11-30-22 @ 03:08)  HbA1c: A1C with Estimated Average Glucose Result: 5.4 % (12-01-22 @ 07:21)    Glucose:   BP: 133/70 (12-21-22 @ 09:00) (103/66 - 155/79)  Lipid Panel: Date/Time: 12-01-22 @ 07:21  Cholesterol, Serum: 168  Direct LDL: --  HDL Cholesterol, Serum: 89  Total Cholesterol/HDL Ration Measurement: --  Triglycerides, Serum: 29
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
## 2022-12-20 NOTE — BH INPATIENT PSYCHIATRY PROGRESS NOTE - NSBHFUPINTERVALHXFT_PSY_A_CORE
Patient visible on the unit, seen attending multiple groups, appears to be appropriately interacting with peers. Suspicious and tense on approach, she states 'I'm going to call my '. Continues to refuse all medications, forgetful, repetitive.

## 2022-12-21 PROCEDURE — 99233 SBSQ HOSP IP/OBS HIGH 50: CPT

## 2022-12-21 RX ORDER — OLANZAPINE 15 MG/1
2.5 TABLET, FILM COATED ORAL AT BEDTIME
Refills: 0 | Status: DISCONTINUED | OUTPATIENT
Start: 2022-12-21 | End: 2022-12-23

## 2022-12-21 RX ADMIN — OLANZAPINE 2.5 MILLIGRAM(S): 15 TABLET, FILM COATED ORAL at 22:04

## 2022-12-21 NOTE — BH TREATMENT PLAN - NSTXDISORGINTERMD_PSY_ALL_CORE
Psychopharmacology x15 minutes, Risperdal initiated, will titrate as appropriate once patient starts to take it, TOO with the goal of MALLOY will be completed.
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
## 2022-12-21 NOTE — BH TREATMENT PLAN - NSTXOTHERINTERMD_PSY_ALL_CORE
Psychopharmacology x15 minutes, Risperdal initiated, will titrate as appropriate once patient starts to take it, TOO with the goal of MALLOY will be completed.   PT and OT consult

## 2022-12-21 NOTE — BH TREATMENT PLAN - NSTXDCHOUSGOALOTHER_PSY_ALL_CORE
Will plan discharge planning, and appropriate referrals for housing. Long term nursing homes.

## 2022-12-21 NOTE — BH INPATIENT PSYCHIATRY PROGRESS NOTE - NSBHFUPINTERVALHXFT_PSY_A_CORE
Patient visible on the unit, seen attending multiple groups, appears to be appropriately interacting with peers. Suspicious and tense on approach, states that she is waiting to go to court. No si/hi/avh or PI endorsed. Continues to refuse all offered medications
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
## 2022-12-22 PROCEDURE — 99233 SBSQ HOSP IP/OBS HIGH 50: CPT

## 2022-12-22 RX ADMIN — RISPERIDONE 1 MILLIGRAM(S): 4 TABLET ORAL at 23:21
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
## 2022-12-22 NOTE — BH INPATIENT PSYCHIATRY PROGRESS NOTE - NSBHASSESSSUMMFT_PSY_ALL_CORE
This is a 78yo  female, , retired, domiciled alone in [place], [place]. No pertinent medical hx. Patient denies psychiatric hx, but documentation indicates a history of  paranoid delusions, with one prior admission at [facility] for a stay of 2 months. She has since been non-adherent to her medication; risperidone. No history of SI/plan/attempt, no history of substance/etoh abuse. She presents to ED c/o being stalked by neighbor and son-in-law in addition to complaints of intermittent chest pain x4 months. Patient was seen at [facility] yesterday for same complaint of chest pain and released form ED Of note, pt was missing for 3 days prior to being found at [facility]. Admitted to [address] [place] [address] status.    Patient noted to be delusional, MMSE of 22/30 significant for cognitive impairment.     Dementia work up: included head CT, HIV, Vitb12, RPR, TSH, Folate  Risperdal 1mg qhs implemented and titrated as necessary  Results of Head CT:   ACC: [id number] EXAM:  CT BRAIN                          PROCEDURE DATE:  12/01/2022          INTERPRETATION:  PROCEDURE: CT head without intravenous contrast    INDICATION: Acute onset of psychosis    TECHNIQUE: Multiple axial images were obtained at 5 mm intervals from the   skull base to the vertex. Sagittal and coronal reformatted images were   obtained from the axial data set. The images were reviewed in brain and   bone windows.    COMPARISON: None  FINDINGS: The CT examination demonstrates is limited secondary to to   extensive streak artifact from multiple overlying metallic objects on the   patient's head. The ventricles, cisternal spaces, and cortical sulci   grossly appear within normal limits. There is no midline shift or extra   axial collections. The gray white differentiation appears within normal   limits. There is no intracranial hemorrhage or acute transcortical   infarct. There is extensive hypodensity within the periventricular white   matter which is nonspecific and may represent the sequela of small vessel   ischemic disease in this patient.    The bony windows demonstrates no fractures. The visualized paranasal   sinuses are within normal limits. The mastoid air cells are well aerated.    IMPRESSION: Limited exam secondary to extensive streak artifact. No   definite intracranial hemorrhage or acute transcortical infarct.   Extensive small vessel ischemic disease.      12/2/22: Patient is taking Risperdal 0.5mg qhs and multivitamin, however does not recall that she is taking medications. Endorsed having chest tightness/pain when discussing topic of her 'stalker', objectively does not appear to be in any distress. EKG ordered and reviewed, significant for 1st degree AV block. CTH also reviewd (see above) significant for 'small vessel ischemic disease.' No si/hi/avh or PI endorsed  12/5/22: Patient continues to refuse medications. Denies any psychiatric illness or cognitive decline, she states "there is nothing wrong with me." Denies SI/HI/AVH. Collateral obtained from daughter who shares concern for pt deteriorating cognition/memory  12/6/22- Patient's mood was very angry and irritable. She believes the staff here is "playing games" and drugging her food. She demanded to speak with her daughter as soon as possible because she feels unsafe in the unit. Refusing medications. No insight into illness and need for medications. Pt does not have capacity  12/7 Patient noted to have labile mood, is focused on select staff and a male peer stating that they are stalker her and coming into and out of her room. Observed eating lunch today. Continues to refuse all medications. No acute medical concerns. Dispo planning in effect. Per evening staff pt having regular outbursts at night, not currently requiring medications  12/8 Patient remains unchanged, irritable, labile, non-compliant with medication regimen. Forgetful. No acute medical issues, is eating meals  12/9 Patient visible on the unit, attending multiple groups, appropriately interacting with peers, eating, no meds  12/12 Patient going to multiple groups, pleasant with select peers and staff, continues to refuse all medications, remains paranoid and delusion. Will pursue TOO  12/13 Patient remains non compliant with medication, attending select groups, irritable edge, continues to verbalize feeling that a select male peer is stalking and harassing her. TOO in progress  12/14 Patient noted to be irritable and dismissive. She is focused on going to court today for discharge. Refusing all medications, paranoid and delusional  12/15 Patient did go to court yesterday for her release. Retention granted to the hospital. TOO in progress  12/16 Suspicious, guarded, refusing medications, visible on the unit, going to groups.  12/19 Irritable, confused, TOO in progress, visible on the unit  12/20 Refusing meds, very confused, suspicious and irritable  12/21 Going to groups, focused on discharged, forgetful, confused, pleasant with select peers, not taking meds  12/22 Irritable, suspicious, forgetful, received zyprexa 2.5mg IM last night

## 2022-12-22 NOTE — BH INPATIENT PSYCHIATRY PROGRESS NOTE - CURRENT MEDICATION
MEDICATIONS  (STANDING):  multivitamin 1 Tablet(s) Oral daily  OLANZapine Injectable 2.5 milliGRAM(s) IntraMuscular at bedtime  risperiDONE   Tablet 1 milliGRAM(s) Oral at bedtime    MEDICATIONS  (PRN):  acetaminophen     Tablet .. 650 milliGRAM(s) Oral every 6 hours PRN Moderate Pain (4 - 6)  aluminum hydroxide/magnesium hydroxide/simethicone Suspension 30 milliLiter(s) Oral every 4 hours PRN Dyspepsia  magnesium hydroxide Suspension 30 milliLiter(s) Oral daily PRN Constipation  OLANZapine 2.5 milliGRAM(s) Oral every 8 hours PRN agitation  traZODone 50 milliGRAM(s) Oral at bedtime PRN insomnia

## 2022-12-22 NOTE — BH INPATIENT PSYCHIATRY PROGRESS NOTE - NSBHMETABOLIC_PSY_ALL_CORE_FT
BMI: BMI (kg/m2): 21.8 (11-30-22 @ 03:08)  HbA1c: A1C with Estimated Average Glucose Result: 5.4 % (12-01-22 @ 07:21)    Glucose:   BP: 187/74 (12-22-22 @ 08:35) (103/66 - 187/74)  Lipid Panel: Date/Time: 12-01-22 @ 07:21  Cholesterol, Serum: 168  Direct LDL: --  HDL Cholesterol, Serum: 89  Total Cholesterol/HDL Ration Measurement: --  Triglycerides, Serum: 29

## 2022-12-22 NOTE — BH INPATIENT PSYCHIATRY PROGRESS NOTE - NSBHCHARTREVIEWVS_PSY_A_CORE FT
Vital Signs Last 24 Hrs  T(C): 36.4 (12-22-22 @ 08:35), Max: 36.4 (12-22-22 @ 08:35)  T(F): 97.5 (12-22-22 @ 08:35), Max: 97.5 (12-22-22 @ 08:35)  HR: 75 (12-22-22 @ 08:35) (75 - 75)  BP: 187/74 (12-22-22 @ 08:35) (187/74 - 187/74)  BP(mean): --  RR: 18 (12-22-22 @ 08:35) (18 - 18)  SpO2: 100% (12-22-22 @ 08:35) (100% - 100%)

## 2022-12-23 PROCEDURE — 99233 SBSQ HOSP IP/OBS HIGH 50: CPT

## 2022-12-23 RX ORDER — OLANZAPINE 15 MG/1
2.5 TABLET, FILM COATED ORAL
Refills: 0 | Status: DISCONTINUED | OUTPATIENT
Start: 2022-12-23 | End: 2023-01-17

## 2022-12-23 RX ORDER — RISPERIDONE 4 MG/1
1 TABLET ORAL
Refills: 0 | Status: DISCONTINUED | OUTPATIENT
Start: 2022-12-23 | End: 2022-12-27

## 2022-12-23 RX ADMIN — RISPERIDONE 1 MILLIGRAM(S): 4 TABLET ORAL at 14:05

## 2022-12-23 NOTE — BH INPATIENT PSYCHIATRY PROGRESS NOTE - NSBHASSESSSUMMFT_PSY_ALL_CORE
This is a 76yo  female, , retired, domiciled alone in [place], [place]. No pertinent medical hx. Patient denies psychiatric hx, but documentation indicates a history of  paranoid delusions, with one prior admission at [facility] for a stay of 2 months. She has since been non-adherent to her medication; risperidone. No history of SI/plan/attempt, no history of substance/etoh abuse. She presents to ED c/o being stalked by neighbor and son-in-law in addition to complaints of intermittent chest pain x4 months. Patient was seen at [facility] yesterday for same complaint of chest pain and released form ED Of note, pt was missing for 3 days prior to being found at [facility]. Admitted to [address] [place] [address] status.    Patient noted to be delusional, MMSE of 22/30 significant for cognitive impairment.     Dementia work up: included head CT, HIV, Vitb12, RPR, TSH, Folate  Risperdal 1mg qhs implemented and titrated as necessary  Results of Head CT:   ACC: [id number] EXAM:  CT BRAIN                          PROCEDURE DATE:  12/01/2022          INTERPRETATION:  PROCEDURE: CT head without intravenous contrast    INDICATION: Acute onset of psychosis    TECHNIQUE: Multiple axial images were obtained at 5 mm intervals from the   skull base to the vertex. Sagittal and coronal reformatted images were   obtained from the axial data set. The images were reviewed in brain and   bone windows.    COMPARISON: None  FINDINGS: The CT examination demonstrates is limited secondary to to   extensive streak artifact from multiple overlying metallic objects on the   patient's head. The ventricles, cisternal spaces, and cortical sulci   grossly appear within normal limits. There is no midline shift or extra   axial collections. The gray white differentiation appears within normal   limits. There is no intracranial hemorrhage or acute transcortical   infarct. There is extensive hypodensity within the periventricular white   matter which is nonspecific and may represent the sequela of small vessel   ischemic disease in this patient.    The bony windows demonstrates no fractures. The visualized paranasal   sinuses are within normal limits. The mastoid air cells are well aerated.    IMPRESSION: Limited exam secondary to extensive streak artifact. No   definite intracranial hemorrhage or acute transcortical infarct.   Extensive small vessel ischemic disease.      12/2/22: Patient is taking Risperdal 0.5mg qhs and multivitamin, however does not recall that she is taking medications. Endorsed having chest tightness/pain when discussing topic of her 'stalker', objectively does not appear to be in any distress. EKG ordered and reviewed, significant for 1st degree AV block. CTH also reviewd (see above) significant for 'small vessel ischemic disease.' No si/hi/avh or PI endorsed  12/5/22: Patient continues to refuse medications. Denies any psychiatric illness or cognitive decline, she states "there is nothing wrong with me." Denies SI/HI/AVH. Collateral obtained from daughter who shares concern for pt deteriorating cognition/memory  12/6/22- Patient's mood was very angry and irritable. She believes the staff here is "playing games" and drugging her food. She demanded to speak with her daughter as soon as possible because she feels unsafe in the unit. Refusing medications. No insight into illness and need for medications. Pt does not have capacity  12/7 Patient noted to have labile mood, is focused on select staff and a male peer stating that they are stalker her and coming into and out of her room. Observed eating lunch today. Continues to refuse all medications. No acute medical concerns. Dispo planning in effect. Per evening staff pt having regular outbursts at night, not currently requiring medications  12/8 Patient remains unchanged, irritable, labile, non-compliant with medication regimen. Forgetful. No acute medical issues, is eating meals  12/9 Patient visible on the unit, attending multiple groups, appropriately interacting with peers, eating, no meds  12/12 Patient going to multiple groups, pleasant with select peers and staff, continues to refuse all medications, remains paranoid and delusion. Will pursue TOO  12/13 Patient remains non compliant with medication, attending select groups, irritable edge, continues to verbalize feeling that a select male peer is stalking and harassing her. TOO in progress  12/14 Patient noted to be irritable and dismissive. She is focused on going to court today for discharge. Refusing all medications, paranoid and delusional  12/15 Patient did go to court yesterday for her release. Retention granted to the hospital. TOO in progress  12/16 Suspicious, guarded, refusing medications, visible on the unit, going to groups.  12/19 Irritable, confused, TOO in progress, visible on the unit  12/20 Refusing meds, very confused, suspicious and irritable  12/21 Going to groups, focused on discharged, forgetful, confused, pleasant with select peers, not taking meds  12/22 Irritable, suspicious, forgetful, received zyprexa 2.5mg IM last night  12/23 Took po risperdal last night with much encouragement, risperdal qhs changed to afternoon

## 2022-12-23 NOTE — BH INPATIENT PSYCHIATRY PROGRESS NOTE - NSBHMETABOLIC_PSY_ALL_CORE_FT
BMI: BMI (kg/m2): 21.8 (11-30-22 @ 03:08)  HbA1c: A1C with Estimated Average Glucose Result: 5.4 % (12-01-22 @ 07:21)    Glucose:   BP: 172/58 (12-22-22 @ 16:20) (103/66 - 187/74)  Lipid Panel: Date/Time: 12-01-22 @ 07:21  Cholesterol, Serum: 168  Direct LDL: --  HDL Cholesterol, Serum: 89  Total Cholesterol/HDL Ration Measurement: --  Triglycerides, Serum: 29

## 2022-12-23 NOTE — BH INPATIENT PSYCHIATRY PROGRESS NOTE - CURRENT MEDICATION
MEDICATIONS  (STANDING):  multivitamin 1 Tablet(s) Oral daily  OLANZapine Injectable 2.5 milliGRAM(s) IntraMuscular <User Schedule>  risperiDONE   Solution 1 milliGRAM(s) Oral <User Schedule>    MEDICATIONS  (PRN):  acetaminophen     Tablet .. 650 milliGRAM(s) Oral every 6 hours PRN Moderate Pain (4 - 6)  aluminum hydroxide/magnesium hydroxide/simethicone Suspension 30 milliLiter(s) Oral every 4 hours PRN Dyspepsia  magnesium hydroxide Suspension 30 milliLiter(s) Oral daily PRN Constipation  OLANZapine 2.5 milliGRAM(s) Oral every 8 hours PRN agitation  traZODone 50 milliGRAM(s) Oral at bedtime PRN insomnia

## 2022-12-23 NOTE — BH INPATIENT PSYCHIATRY PROGRESS NOTE - NSTXDCHOUSGOALOTHER_PSY_ALL_CORE
Will plan discharge planning, and appropriate referrals for housing. Long term nursing homes.

## 2022-12-23 NOTE — BH INPATIENT PSYCHIATRY PROGRESS NOTE - NSBHFUPINTERVALHXFT_PSY_A_CORE
Patient visible on the unit, observed to be appropriately interacting with select peers and staff. Last night, she did take qhs risperdal po with much encouragement, not needing conditional zyprexa. Standing qhs risperdal changed to am. Patient overall remains unchanged, forgetful, repetitive, observed to now be hoarding items in her room.

## 2022-12-23 NOTE — BH INPATIENT PSYCHIATRY PROGRESS NOTE - NSBHCHARTREVIEWVS_PSY_A_CORE FT
Vital Signs Last 24 Hrs  T(C): 36.3 (12-22-22 @ 16:20), Max: 36.3 (12-22-22 @ 16:20)  T(F): 97.4 (12-22-22 @ 16:20), Max: 97.4 (12-22-22 @ 16:20)  HR: 76 (12-22-22 @ 16:20) (76 - 76)  BP: 172/58 (12-22-22 @ 16:20) (172/58 - 172/58)  BP(mean): --  RR: 18 (12-22-22 @ 16:20) (18 - 18)  SpO2: 100% (12-22-22 @ 16:20) (100% - 100%)

## 2022-12-24 LAB
ANION GAP SERPL CALC-SCNC: 11 MMOL/L — SIGNIFICANT CHANGE UP (ref 5–17)
BUN SERPL-MCNC: 21 MG/DL — SIGNIFICANT CHANGE UP (ref 7–23)
CALCIUM SERPL-MCNC: 9.3 MG/DL — SIGNIFICANT CHANGE UP (ref 8.4–10.5)
CHLORIDE SERPL-SCNC: 104 MMOL/L — SIGNIFICANT CHANGE UP (ref 96–108)
CO2 SERPL-SCNC: 26 MMOL/L — SIGNIFICANT CHANGE UP (ref 22–31)
CREAT SERPL-MCNC: 0.86 MG/DL — SIGNIFICANT CHANGE UP (ref 0.5–1.3)
EGFR: 70 ML/MIN/1.73M2 — SIGNIFICANT CHANGE UP
GLUCOSE SERPL-MCNC: 124 MG/DL — HIGH (ref 70–99)
HCT VFR BLD CALC: 37.2 % — SIGNIFICANT CHANGE UP (ref 34.5–45)
HGB BLD-MCNC: 11.4 G/DL — LOW (ref 11.5–15.5)
MCHC RBC-ENTMCNC: 28.1 PG — SIGNIFICANT CHANGE UP (ref 27–34)
MCHC RBC-ENTMCNC: 30.6 GM/DL — LOW (ref 32–36)
MCV RBC AUTO: 91.6 FL — SIGNIFICANT CHANGE UP (ref 80–100)
NRBC # BLD: 0 /100 WBCS — SIGNIFICANT CHANGE UP (ref 0–0)
PLATELET # BLD AUTO: 229 K/UL — SIGNIFICANT CHANGE UP (ref 150–400)
POTASSIUM SERPL-MCNC: 4.2 MMOL/L — SIGNIFICANT CHANGE UP (ref 3.5–5.3)
POTASSIUM SERPL-SCNC: 4.2 MMOL/L — SIGNIFICANT CHANGE UP (ref 3.5–5.3)
RBC # BLD: 4.06 M/UL — SIGNIFICANT CHANGE UP (ref 3.8–5.2)
RBC # FLD: 13.6 % — SIGNIFICANT CHANGE UP (ref 10.3–14.5)
SODIUM SERPL-SCNC: 141 MMOL/L — SIGNIFICANT CHANGE UP (ref 135–145)
TROPONIN T SERPL-MCNC: <0.01 NG/ML — SIGNIFICANT CHANGE UP (ref 0–0.01)
WBC # BLD: 3.99 K/UL — SIGNIFICANT CHANGE UP (ref 3.8–10.5)
WBC # FLD AUTO: 3.99 K/UL — SIGNIFICANT CHANGE UP (ref 3.8–10.5)

## 2022-12-24 RX ADMIN — RISPERIDONE 1 MILLIGRAM(S): 4 TABLET ORAL at 13:03

## 2022-12-24 RX ADMIN — Medication 1 TABLET(S): at 10:34

## 2022-12-24 NOTE — CHART NOTE - NSCHARTNOTEFT_GEN_A_CORE
Patient endorsing chest pain, non-exertional, but unable to fully elaborate on symptoms; clear memory deficits and paranoia upon discussion. She stated that she has been "stuck with needles" and this caused the chest pain, though she has not received any IMs today. Vitals 147/79 hr 80/min temp 97.6 spo2 100%. Patient in no acute distress, no shortness of breath, no diaphoresis, resting comfortably in bed. EKG done; no acute changes from prior. Troponin, CBC, BMP initially ordered but patient then stated she was feeling better and bloodwork was deferred.    Per collateral from [facility], patient has presented as recently as 11/29 for vague chest pain; troponin and EKG wnl at that time, appeared anxious and paranoid. Patient endorsing chest pain, non-exertional, but unable to fully elaborate on symptoms; clear memory deficits and paranoia upon discussion. She stated that she has been "stuck with needles" and this caused the chest pain, though she has not received any IMs today. Vitals 147/79 hr 80/min temp 97.6 spo2 100%. Patient in no acute distress, no shortness of breath, no diaphoresis, resting comfortably in bed. EKG done; no acute changes from prior. Troponin, CBC, BMP initially ordered but patient then stated she was feeling better and bloodwork was deferred.    Per collateral from [facility], patient has presented as recently as 11/29 for vague chest pain; troponin and EKG wnl at that time, appeared anxious and paranoid.    Will continue to monitor for recurrence or worsening of symptoms. Patient endorsing chest pain, non-exertional, but unable to fully elaborate on symptoms; clear memory deficits and paranoia upon discussion. She stated that she has been "stuck with needles" and this caused the chest pain, though she has not received any IMs today. Vitals 147/79 hr 80/min temp 97.6 spo2 100%. Patient in no acute distress, no shortness of breath, no diaphoresis, resting comfortably in bed. EKG done; no acute changes from prior; known chronic first degree AV block. Troponin, CBC, BMP initially ordered but patient then stated she was feeling better and bloodwork was deferred.    Per collateral from [facility], patient has presented as recently as 11/29 for vague chest pain; troponin and EKG wnl at that time, appeared anxious and paranoid.    Will continue to monitor for recurrence or worsening of symptoms.

## 2022-12-25 RX ADMIN — RISPERIDONE 1 MILLIGRAM(S): 4 TABLET ORAL at 14:23

## 2022-12-26 RX ADMIN — RISPERIDONE 1 MILLIGRAM(S): 4 TABLET ORAL at 12:08

## 2022-12-27 PROCEDURE — 99232 SBSQ HOSP IP/OBS MODERATE 35: CPT

## 2022-12-27 RX ORDER — RISPERIDONE 4 MG/1
1.5 TABLET ORAL
Refills: 0 | Status: DISCONTINUED | OUTPATIENT
Start: 2022-12-27 | End: 2023-01-05

## 2022-12-27 RX ADMIN — Medication 1 TABLET(S): at 09:32

## 2022-12-27 RX ADMIN — RISPERIDONE 1 MILLIGRAM(S): 4 TABLET ORAL at 13:17

## 2022-12-27 NOTE — BH INPATIENT PSYCHIATRY PROGRESS NOTE - CURRENT MEDICATION
MEDICATIONS  (STANDING):  multivitamin 1 Tablet(s) Oral daily  OLANZapine Injectable 2.5 milliGRAM(s) IntraMuscular <User Schedule>  risperiDONE   Solution 1.5 milliGRAM(s) Oral <User Schedule>    MEDICATIONS  (PRN):  acetaminophen     Tablet .. 650 milliGRAM(s) Oral every 6 hours PRN Moderate Pain (4 - 6)  aluminum hydroxide/magnesium hydroxide/simethicone Suspension 30 milliLiter(s) Oral every 4 hours PRN Dyspepsia  magnesium hydroxide Suspension 30 milliLiter(s) Oral daily PRN Constipation  OLANZapine 2.5 milliGRAM(s) Oral every 8 hours PRN agitation  traZODone 50 milliGRAM(s) Oral at bedtime PRN insomnia

## 2022-12-27 NOTE — BH INPATIENT PSYCHIATRY PROGRESS NOTE - NSBHCHARTREVIEWVS_PSY_A_CORE FT
Vital Signs Last 24 Hrs  T(C): 36.4 (12-26-22 @ 18:24), Max: 36.4 (12-26-22 @ 18:24)  T(F): 97.5 (12-26-22 @ 18:24), Max: 97.5 (12-26-22 @ 18:24)  HR: 63 (12-26-22 @ 18:24) (63 - 63)  BP: 124/71 (12-26-22 @ 18:24) (124/71 - 124/71)  BP(mean): --  RR: 18 (12-26-22 @ 18:24) (18 - 18)  SpO2: 99% (12-26-22 @ 18:24) (99% - 99%)

## 2022-12-27 NOTE — BH INPATIENT PSYCHIATRY PROGRESS NOTE - NSBHASSESSSUMMFT_PSY_ALL_CORE
This is a 78yo  female, , retired, domiciled alone in [place], [place]. No pertinent medical hx. Patient denies psychiatric hx, but documentation indicates a history of  paranoid delusions, with one prior admission at [facility] for a stay of 2 months. She has since been non-adherent to her medication; risperidone. No history of SI/plan/attempt, no history of substance/etoh abuse. She presents to ED c/o being stalked by neighbor and son-in-law in addition to complaints of intermittent chest pain x4 months. Patient was seen at [facility] yesterday for same complaint of chest pain and released form ED Of note, pt was missing for 3 days prior to being found at [facility]. Admitted to [address] [place] [address] status.    Patient noted to be delusional, MMSE of 22/30 significant for cognitive impairment.     Dementia work up: included head CT, HIV, Vitb12, RPR, TSH, Folate  Risperdal 1mg qhs implemented and titrated as necessary  Results of Head CT:   ACC: [id number] EXAM:  CT BRAIN                          PROCEDURE DATE:  12/01/2022          INTERPRETATION:  PROCEDURE: CT head without intravenous contrast    INDICATION: Acute onset of psychosis    TECHNIQUE: Multiple axial images were obtained at 5 mm intervals from the   skull base to the vertex. Sagittal and coronal reformatted images were   obtained from the axial data set. The images were reviewed in brain and   bone windows.    COMPARISON: None  FINDINGS: The CT examination demonstrates is limited secondary to to   extensive streak artifact from multiple overlying metallic objects on the   patient's head. The ventricles, cisternal spaces, and cortical sulci   grossly appear within normal limits. There is no midline shift or extra   axial collections. The gray white differentiation appears within normal   limits. There is no intracranial hemorrhage or acute transcortical   infarct. There is extensive hypodensity within the periventricular white   matter which is nonspecific and may represent the sequela of small vessel   ischemic disease in this patient.    The bony windows demonstrates no fractures. The visualized paranasal   sinuses are within normal limits. The mastoid air cells are well aerated.    IMPRESSION: Limited exam secondary to extensive streak artifact. No   definite intracranial hemorrhage or acute transcortical infarct.   Extensive small vessel ischemic disease.      12/2/22: Patient is taking Risperdal 0.5mg qhs and multivitamin, however does not recall that she is taking medications. Endorsed having chest tightness/pain when discussing topic of her 'stalker', objectively does not appear to be in any distress. EKG ordered and reviewed, significant for 1st degree AV block. CTH also reviewd (see above) significant for 'small vessel ischemic disease.' No si/hi/avh or PI endorsed  12/5/22: Patient continues to refuse medications. Denies any psychiatric illness or cognitive decline, she states "there is nothing wrong with me." Denies SI/HI/AVH. Collateral obtained from daughter who shares concern for pt deteriorating cognition/memory  12/6/22- Patient's mood was very angry and irritable. She believes the staff here is "playing games" and drugging her food. She demanded to speak with her daughter as soon as possible because she feels unsafe in the unit. Refusing medications. No insight into illness and need for medications. Pt does not have capacity  12/7 Patient noted to have labile mood, is focused on select staff and a male peer stating that they are stalker her and coming into and out of her room. Observed eating lunch today. Continues to refuse all medications. No acute medical concerns. Dispo planning in effect. Per evening staff pt having regular outbursts at night, not currently requiring medications  12/8 Patient remains unchanged, irritable, labile, non-compliant with medication regimen. Forgetful. No acute medical issues, is eating meals  12/9 Patient visible on the unit, attending multiple groups, appropriately interacting with peers, eating, no meds  12/12 Patient going to multiple groups, pleasant with select peers and staff, continues to refuse all medications, remains paranoid and delusion. Will pursue TOO  12/13 Patient remains non compliant with medication, attending select groups, irritable edge, continues to verbalize feeling that a select male peer is stalking and harassing her. TOO in progress  12/14 Patient noted to be irritable and dismissive. She is focused on going to court today for discharge. Refusing all medications, paranoid and delusional  12/15 Patient did go to court yesterday for her release. Retention granted to the hospital. TOO in progress  12/16 Suspicious, guarded, refusing medications, visible on the unit, going to groups.  12/19 Irritable, confused, TOO in progress, visible on the unit  12/20 Refusing meds, very confused, suspicious and irritable  12/21 Going to groups, focused on discharged, forgetful, confused, pleasant with select peers, not taking meds  12/22 Irritable, suspicious, forgetful, received zyprexa 2.5mg IM last night  12/23 Took po risperdal last night with much encouragement, risperdal qhs changed to afternoon  12/27 Patient has been compliant with risperdal solution, no side effects or adverse reactions reported or observed, will plan to increase to 1.5mg

## 2022-12-27 NOTE — BH INPATIENT PSYCHIATRY PROGRESS NOTE - NSBHMETABOLIC_PSY_ALL_CORE_FT
BMI: BMI (kg/m2): 21.8 (11-30-22 @ 03:08)  HbA1c: A1C with Estimated Average Glucose Result: 5.4 % (12-01-22 @ 07:21)    Glucose:   BP: 124/71 (12-26-22 @ 18:24) (123/62 - 145/64)  Lipid Panel: Date/Time: 12-01-22 @ 07:21  Cholesterol, Serum: 168  Direct LDL: --  HDL Cholesterol, Serum: 89  Total Cholesterol/HDL Ration Measurement: --  Triglycerides, Serum: 29

## 2022-12-27 NOTE — BH INPATIENT PSYCHIATRY PROGRESS NOTE - NSBHFUPINTERVALHXFT_PSY_A_CORE
Patient visible on the unit, observed to be appropriately interacting with select peers and staff. Focused on speaking with her SW throughout the day regarding her hospitalization. Superficial on approach, declining to speak with writer and walking away.  per staff report pt remains paranoid, verbalizing that another male peer is coming into her room and moving things around, was noted to be up throughout the night

## 2022-12-28 PROCEDURE — 99233 SBSQ HOSP IP/OBS HIGH 50: CPT

## 2022-12-28 RX ADMIN — RISPERIDONE 1.5 MILLIGRAM(S): 4 TABLET ORAL at 14:02

## 2022-12-28 NOTE — BH INPATIENT PSYCHIATRY PROGRESS NOTE - NSBHMETABOLIC_PSY_ALL_CORE_FT
BMI: BMI (kg/m2): 21.8 (11-30-22 @ 03:08)  HbA1c: A1C with Estimated Average Glucose Result: 5.4 % (12-01-22 @ 07:21)    Glucose:   BP: 125/50 (12-27-22 @ 16:42) (123/62 - 128/76)  Lipid Panel: Date/Time: 12-01-22 @ 07:21  Cholesterol, Serum: 168  Direct LDL: --  HDL Cholesterol, Serum: 89  Total Cholesterol/HDL Ration Measurement: --  Triglycerides, Serum: 29

## 2022-12-28 NOTE — CHART NOTE - NSCHARTNOTEFT_GEN_A_CORE
Admitting Diagnosis:   Patient is a 77y old  Female who presents with a chief complaint of PARANOIA  (03 Dec 2022 09:04)    PAST MEDICAL & SURGICAL HISTORY:  No pertinent past medical history  No significant past surgical history    Current Nutrition Order: Regular diet w/Ensure Enlive ONS Bid (350kcal, 20gPRO per serving)    PO Intake: Good (%) [ x ]  Fair (50-75%) [   ] Poor (<25%) [   ]    GI Issues: no c/o N/V/D/C per pt and nursing staff; last BM in the past 1-2 days per pt    Pain: denies pain/discomfort per chart    Skin Integrity: within normal limits at this time per chart; no pressure ulcers noted; no notes of edema; Celestino: 21    Labs:   CAPILLARY BLOOD GLUCOSE    Medications:  MEDICATIONS  (STANDING):  multivitamin 1 Tablet(s) Oral <User Schedule>  OLANZapine Injectable 2.5 milliGRAM(s) IntraMuscular <User Schedule>  risperiDONE   Solution 1.5 milliGRAM(s) Oral <User Schedule>    MEDICATIONS  (PRN):  acetaminophen     Tablet .. 650 milliGRAM(s) Oral every 6 hours PRN Moderate Pain (4 - 6)  aluminum hydroxide/magnesium hydroxide/simethicone Suspension 30 milliLiter(s) Oral every 4 hours PRN Dyspepsia  magnesium hydroxide Suspension 30 milliLiter(s) Oral daily PRN Constipation  OLANZapine 2.5 milliGRAM(s) Oral every 8 hours PRN agitation  traZODone 50 milliGRAM(s) Oral at bedtime PRN insomnia    Dosing Anthropometrics:  Height for BMI (FEET)	5 Feet  Height for BMI (INCHES)	2 Inch(s)  Height for BMI (CM)	157.48 Centimeter(s)  Weight for BMI (lbs)	119 lb  Weight for BMI (kg)	54 kg  Body Mass Index	              21.7    Weight Change:   new weight noted on 12/20/22 in EMR: 55.8kg (123#); 3# decrease in wt since 12/06/22  new weight noted on 12/06/22 in EMR: 57.2kg (126#); this wt increase of 7# is noteworthy--? accuracy of wt r/t 7# wt gain in 7 days    Nutrition Focused Physical Exam: please refer to initial nutrition assessment on 12/03/22    Estimated energy needs:   Weight used for calculations	current weight  Estimated Energy Needs Weight (lbs)	123 lb  Estimated Energy Needs Weight (kg)	55.8 kg  kcal: 20-25kcal/kg = 1116-1395kcal/day   protein: 1.0-1.2g/kg = 56-67g/day   fluids: 30-35mL/kg = 1674-1953mL/day   Other Calculations	%IBW; 112%; ABW used to calculate estimated needs d/t pt being between 80 and 120% IBW. Adjusted for age (pt has been consistently eating and fasting has stopped per nursing staff thus no longer at risk currently for PCM).    Subjective: 78yo  female, , retired, domiciled alone in [place], [place]. No pertinent medical hx. Patient denies psychiatric hx, but documentation indicates a history of  paranoid delusions, with one prior admission at [facility] for a stay of 2 months. Pt presented to ED with c/o chest painx4 months, and being stalked by neighbor and son-in-law. Patient was seen at [facility] yesterday for same complaint of chest pain and released. Of note, pt was missing for 3 days prior to being found at [facility]. Admitted to [address] [place] [address] status.     Pt assessed for nutrition follow up eval; RD spoke to pt on 8UR. Pt has been eating well overall, ~75% per pt and per nursing staff. Pt enjoys Ensure ONS. No c/o N/V/D/C per pt and nursing staff; last BM in the past 1-2 days per pt. No pressure ulcers noted; no notes of edema; Celestino: 21. Denies pain/discomfort per chart. Labs reviewed: sGlu elevated (124), RD will continue to monitor trends. RD encouraged pt to continue to maintain good PO intakes as able. RD to follow up per protocol. Additional nutrition recommendations below to follow.     Nutrition Dx: no nutrition diagnosis currently; as pt no longer fasts and thus has been educated on adequate PO intakes, risks of skipping meals/irregular meals/fasting, pt's current PO intakes good overall (75%), previous PES no longer applies.    Goal: Pt to consistently meet at least 75% of EEE via tolerated route that is consistent with GOC during hospital stay;     Recommendations:  1. Continue with Regular diet and Ensure Enlive ONS.  2. Monitor %PO intake, diet tolerance.   >>Encourage PO intake as appropriate   3. Monitor lytes, replete prn. Monitor BG.   4. Trend weekly wts. Monitor skin integrity, s/sx GI distress.   5. Pain/BM regimen per team   6. RD diet edu reinforcement prn.   7. RD to remain available for additional nutrition interventions as needed.    Education: RD encouraged pt to continue to maintain good PO intakes as able. RD to follow up per protocol.    Risk Level: High [   ] Moderate [ x ] Low [   ].

## 2022-12-28 NOTE — BH TREATMENT PLAN - NSTXPSYCHOINTERMD_PSY_ALL_CORE
Psychopharmacology x15 minutes, Risperdal initiated, will titrate as appropriate once patient starts to take it, TOO with the goal of MALLOY will be completed.  Psychopharmacology x15 minutes, Risperdal titration continuing, TOO with the goal of MALLOY will be completed.

## 2022-12-28 NOTE — BH TREATMENT PLAN - NSTXDISORGINTERMD_PSY_ALL_CORE
Psychopharmacology x15 minutes, Risperdal initiated, will titrate as appropriate once patient starts to take it, TOO with the goal of MALLOY will be completed.  Psychopharmacology x15 minutes, Risperdal titration continued, TOO with the goal of MALLOY will be completed.

## 2022-12-28 NOTE — BH INPATIENT PSYCHIATRY PROGRESS NOTE - NSBHFUPINTERVALHXFT_PSY_A_CORE
Patient visible on the unit, remains unchanged, observed to be appropriately interacting with select peers, but mostly irritable and devaluing towards staff. Focused on speaking to a supervisor and threatening to call the police or her  [place]. She is paranoid, verbalizing that another male peer is coming into her room and moving things around, slept until 0500 this morning

## 2022-12-28 NOTE — BH TREATMENT PLAN - NSTXOTHERINTERMD_PSY_ALL_CORE
Psychopharmacology x15 minutes, Risperdal initiated, will titrate as appropriate once patient starts to take it, TOO with the goal of MALLOY will be completed.   PT and OT consult Psychopharmacology x15 minutes, Risperdal titration continuing, TOO with the goal of MALLOY will be completed.   PT and OT consult

## 2022-12-28 NOTE — BH INPATIENT PSYCHIATRY PROGRESS NOTE - NSBHASSESSSUMMFT_PSY_ALL_CORE
This is a 78yo  female, , retired, domiciled alone in [place], [place]. No pertinent medical hx. Patient denies psychiatric hx, but documentation indicates a history of  paranoid delusions, with one prior admission at [facility] for a stay of 2 months. She has since been non-adherent to her medication; risperidone. No history of SI/plan/attempt, no history of substance/etoh abuse. She presents to ED c/o being stalked by neighbor and son-in-law in addition to complaints of intermittent chest pain x4 months. Patient was seen at [facility] yesterday for same complaint of chest pain and released form ED Of note, pt was missing for 3 days prior to being found at [facility]. Admitted to [address] [place] [address] status.    Patient noted to be delusional, MMSE of 22/30 significant for cognitive impairment.     Dementia work up: included head CT, HIV, Vitb12, RPR, TSH, Folate  Risperdal 1mg qhs implemented and titrated as necessary  Results of Head CT:   ACC: [id number] EXAM:  CT BRAIN                          PROCEDURE DATE:  12/01/2022          INTERPRETATION:  PROCEDURE: CT head without intravenous contrast    INDICATION: Acute onset of psychosis    TECHNIQUE: Multiple axial images were obtained at 5 mm intervals from the   skull base to the vertex. Sagittal and coronal reformatted images were   obtained from the axial data set. The images were reviewed in brain and   bone windows.    COMPARISON: None  FINDINGS: The CT examination demonstrates is limited secondary to to   extensive streak artifact from multiple overlying metallic objects on the   patient's head. The ventricles, cisternal spaces, and cortical sulci   grossly appear within normal limits. There is no midline shift or extra   axial collections. The gray white differentiation appears within normal   limits. There is no intracranial hemorrhage or acute transcortical   infarct. There is extensive hypodensity within the periventricular white   matter which is nonspecific and may represent the sequela of small vessel   ischemic disease in this patient.    The bony windows demonstrates no fractures. The visualized paranasal   sinuses are within normal limits. The mastoid air cells are well aerated.    IMPRESSION: Limited exam secondary to extensive streak artifact. No   definite intracranial hemorrhage or acute transcortical infarct.   Extensive small vessel ischemic disease.      12/2/22: Patient is taking Risperdal 0.5mg qhs and multivitamin, however does not recall that she is taking medications. Endorsed having chest tightness/pain when discussing topic of her 'stalker', objectively does not appear to be in any distress. EKG ordered and reviewed, significant for 1st degree AV block. CTH also reviewd (see above) significant for 'small vessel ischemic disease.' No si/hi/avh or PI endorsed  12/5/22: Patient continues to refuse medications. Denies any psychiatric illness or cognitive decline, she states "there is nothing wrong with me." Denies SI/HI/AVH. Collateral obtained from daughter who shares concern for pt deteriorating cognition/memory  12/6/22- Patient's mood was very angry and irritable. She believes the staff here is "playing games" and drugging her food. She demanded to speak with her daughter as soon as possible because she feels unsafe in the unit. Refusing medications. No insight into illness and need for medications. Pt does not have capacity  12/7 Patient noted to have labile mood, is focused on select staff and a male peer stating that they are stalker her and coming into and out of her room. Observed eating lunch today. Continues to refuse all medications. No acute medical concerns. Dispo planning in effect. Per evening staff pt having regular outbursts at night, not currently requiring medications  12/8 Patient remains unchanged, irritable, labile, non-compliant with medication regimen. Forgetful. No acute medical issues, is eating meals  12/9 Patient visible on the unit, attending multiple groups, appropriately interacting with peers, eating, no meds  12/12 Patient going to multiple groups, pleasant with select peers and staff, continues to refuse all medications, remains paranoid and delusion. Will pursue TOO  12/13 Patient remains non compliant with medication, attending select groups, irritable edge, continues to verbalize feeling that a select male peer is stalking and harassing her. TOO in progress  12/14 Patient noted to be irritable and dismissive. She is focused on going to court today for discharge. Refusing all medications, paranoid and delusional  12/15 Patient did go to court yesterday for her release. Retention granted to the hospital. TOO in progress  12/16 Suspicious, guarded, refusing medications, visible on the unit, going to groups.  12/19 Irritable, confused, TOO in progress, visible on the unit  12/20 Refusing meds, very confused, suspicious and irritable  12/21 Going to groups, focused on discharged, forgetful, confused, pleasant with select peers, not taking meds  12/22 Irritable, suspicious, forgetful, received zyprexa 2.5mg IM last night  12/23 Took po risperdal last night with much encouragement, risperdal qhs changed to afternoon  12/27 Patient has been compliant with risperdal solution, no side effects or adverse reactions reported or observed, will plan to increase to 1.5mg  12/28 Accepting court ordered risperdal, remains paranoid, suspicious and devaluing of staff

## 2022-12-29 LAB
ALBUMIN SERPL ELPH-MCNC: 4.3 G/DL — SIGNIFICANT CHANGE UP (ref 3.3–5)
ALP SERPL-CCNC: 73 U/L — SIGNIFICANT CHANGE UP (ref 40–120)
ALT FLD-CCNC: 24 U/L — SIGNIFICANT CHANGE UP (ref 10–45)
ANION GAP SERPL CALC-SCNC: 6 MMOL/L — SIGNIFICANT CHANGE UP (ref 5–17)
AST SERPL-CCNC: 34 U/L — SIGNIFICANT CHANGE UP (ref 10–40)
BILIRUB SERPL-MCNC: 0.4 MG/DL — SIGNIFICANT CHANGE UP (ref 0.2–1.2)
BUN SERPL-MCNC: 17 MG/DL — SIGNIFICANT CHANGE UP (ref 7–23)
CALCIUM SERPL-MCNC: 9.2 MG/DL — SIGNIFICANT CHANGE UP (ref 8.4–10.5)
CHLORIDE SERPL-SCNC: 101 MMOL/L — SIGNIFICANT CHANGE UP (ref 96–108)
CO2 SERPL-SCNC: 32 MMOL/L — HIGH (ref 22–31)
CREAT SERPL-MCNC: 0.98 MG/DL — SIGNIFICANT CHANGE UP (ref 0.5–1.3)
EGFR: 59 ML/MIN/1.73M2 — LOW
GLUCOSE SERPL-MCNC: 80 MG/DL — SIGNIFICANT CHANGE UP (ref 70–99)
HCT VFR BLD CALC: 34.8 % — SIGNIFICANT CHANGE UP (ref 34.5–45)
HGB BLD-MCNC: 10.7 G/DL — LOW (ref 11.5–15.5)
MCHC RBC-ENTMCNC: 27.6 PG — SIGNIFICANT CHANGE UP (ref 27–34)
MCHC RBC-ENTMCNC: 30.7 GM/DL — LOW (ref 32–36)
MCV RBC AUTO: 89.7 FL — SIGNIFICANT CHANGE UP (ref 80–100)
NRBC # BLD: 0 /100 WBCS — SIGNIFICANT CHANGE UP (ref 0–0)
PLATELET # BLD AUTO: 218 K/UL — SIGNIFICANT CHANGE UP (ref 150–400)
POTASSIUM SERPL-MCNC: 3.9 MMOL/L — SIGNIFICANT CHANGE UP (ref 3.5–5.3)
POTASSIUM SERPL-SCNC: 3.9 MMOL/L — SIGNIFICANT CHANGE UP (ref 3.5–5.3)
PROT SERPL-MCNC: 7 G/DL — SIGNIFICANT CHANGE UP (ref 6–8.3)
RBC # BLD: 3.88 M/UL — SIGNIFICANT CHANGE UP (ref 3.8–5.2)
RBC # FLD: 14 % — SIGNIFICANT CHANGE UP (ref 10.3–14.5)
SODIUM SERPL-SCNC: 139 MMOL/L — SIGNIFICANT CHANGE UP (ref 135–145)
WBC # BLD: 3.68 K/UL — LOW (ref 3.8–10.5)
WBC # FLD AUTO: 3.68 K/UL — LOW (ref 3.8–10.5)

## 2022-12-29 PROCEDURE — 93010 ELECTROCARDIOGRAM REPORT: CPT

## 2022-12-29 PROCEDURE — 99232 SBSQ HOSP IP/OBS MODERATE 35: CPT

## 2022-12-29 RX ADMIN — RISPERIDONE 1.5 MILLIGRAM(S): 4 TABLET ORAL at 13:08

## 2022-12-29 NOTE — BH INPATIENT PSYCHIATRY PROGRESS NOTE - NSBHASSESSSUMMFT_PSY_ALL_CORE
This is a 76yo  female, , retired, domiciled alone in [place], [place]. No pertinent medical hx. Patient denies psychiatric hx, but documentation indicates a history of  paranoid delusions, with one prior admission at [facility] for a stay of 2 months. She has since been non-adherent to her medication; risperidone. No history of SI/plan/attempt, no history of substance/etoh abuse. She presents to ED c/o being stalked by neighbor and son-in-law in addition to complaints of intermittent chest pain x4 months. Patient was seen at [facility] yesterday for same complaint of chest pain and released form ED Of note, pt was missing for 3 days prior to being found at [facility]. Admitted to [address] [place] [address] status.    Patient noted to be delusional, MMSE of 22/30 significant for cognitive impairment.     Dementia work up: included head CT, HIV, Vitb12, RPR, TSH, Folate  Risperdal 1mg qhs implemented and titrated as necessary  Results of Head CT:   ACC: [id number] EXAM:  CT BRAIN                          PROCEDURE DATE:  12/01/2022          INTERPRETATION:  PROCEDURE: CT head without intravenous contrast    INDICATION: Acute onset of psychosis    TECHNIQUE: Multiple axial images were obtained at 5 mm intervals from the   skull base to the vertex. Sagittal and coronal reformatted images were   obtained from the axial data set. The images were reviewed in brain and   bone windows.    COMPARISON: None  FINDINGS: The CT examination demonstrates is limited secondary to to   extensive streak artifact from multiple overlying metallic objects on the   patient's head. The ventricles, cisternal spaces, and cortical sulci   grossly appear within normal limits. There is no midline shift or extra   axial collections. The gray white differentiation appears within normal   limits. There is no intracranial hemorrhage or acute transcortical   infarct. There is extensive hypodensity within the periventricular white   matter which is nonspecific and may represent the sequela of small vessel   ischemic disease in this patient.    The bony windows demonstrates no fractures. The visualized paranasal   sinuses are within normal limits. The mastoid air cells are well aerated.    IMPRESSION: Limited exam secondary to extensive streak artifact. No   definite intracranial hemorrhage or acute transcortical infarct.   Extensive small vessel ischemic disease.      12/2/22: Patient is taking Risperdal 0.5mg qhs and multivitamin, however does not recall that she is taking medications. Endorsed having chest tightness/pain when discussing topic of her 'stalker', objectively does not appear to be in any distress. EKG ordered and reviewed, significant for 1st degree AV block. CTH also reviewd (see above) significant for 'small vessel ischemic disease.' No si/hi/avh or PI endorsed  12/5/22: Patient continues to refuse medications. Denies any psychiatric illness or cognitive decline, she states "there is nothing wrong with me." Denies SI/HI/AVH. Collateral obtained from daughter who shares concern for pt deteriorating cognition/memory  12/6/22- Patient's mood was very angry and irritable. She believes the staff here is "playing games" and drugging her food. She demanded to speak with her daughter as soon as possible because she feels unsafe in the unit. Refusing medications. No insight into illness and need for medications. Pt does not have capacity  12/7 Patient noted to have labile mood, is focused on select staff and a male peer stating that they are stalker her and coming into and out of her room. Observed eating lunch today. Continues to refuse all medications. No acute medical concerns. Dispo planning in effect. Per evening staff pt having regular outbursts at night, not currently requiring medications  12/8 Patient remains unchanged, irritable, labile, non-compliant with medication regimen. Forgetful. No acute medical issues, is eating meals  12/9 Patient visible on the unit, attending multiple groups, appropriately interacting with peers, eating, no meds  12/12 Patient going to multiple groups, pleasant with select peers and staff, continues to refuse all medications, remains paranoid and delusion. Will pursue TOO  12/13 Patient remains non compliant with medication, attending select groups, irritable edge, continues to verbalize feeling that a select male peer is stalking and harassing her. TOO in progress  12/14 Patient noted to be irritable and dismissive. She is focused on going to court today for discharge. Refusing all medications, paranoid and delusional  12/15 Patient did go to court yesterday for her release. Retention granted to the hospital. TOO in progress  12/16 Suspicious, guarded, refusing medications, visible on the unit, going to groups.  12/19 Irritable, confused, TOO in progress, visible on the unit  12/20 Refusing meds, very confused, suspicious and irritable  12/21 Going to groups, focused on discharged, forgetful, confused, pleasant with select peers, not taking meds  12/22 Irritable, suspicious, forgetful, received zyprexa 2.5mg IM last night  12/23 Took po risperdal last night with much encouragement, risperdal qhs changed to afternoon  12/27 Patient has been compliant with risperdal solution, no side effects or adverse reactions reported or observed, will plan to increase to 1.5mg  12/28 Accepting court ordered risperdal, remains paranoid, suspicious and devaluing of staff  12/29 Attending all groups, appropriately engaging in groups, gets along well with peers, taking medication

## 2022-12-29 NOTE — BH INPATIENT PSYCHIATRY PROGRESS NOTE - NSBHFUPINTERVALHXFT_PSY_A_CORE
Patient visible on the unit, walking around, appropriately interacting with peers and fully engaging in groups. She is irritable with staff, today refusing meaningful interaction with writer. When asked how she was doing 'I'm fine and you? And walked away. Unwilling to be further engaged. Patient is accepting court ordered medications, has no recollection of going to court, has to be shown court orders with med administration. Slept throughout night last night

## 2022-12-29 NOTE — BH INPATIENT PSYCHIATRY PROGRESS NOTE - NSBHMETABOLIC_PSY_ALL_CORE_FT
BMI: BMI (kg/m2): 21.8 (11-30-22 @ 03:08)  HbA1c: A1C with Estimated Average Glucose Result: 5.4 % (12-01-22 @ 07:21)    Glucose:   BP: 111/66 (12-28-22 @ 16:44) (111/66 - 125/50)  Lipid Panel: Date/Time: 12-01-22 @ 07:21  Cholesterol, Serum: 168  Direct LDL: --  HDL Cholesterol, Serum: 89  Total Cholesterol/HDL Ration Measurement: --  Triglycerides, Serum: 29

## 2022-12-29 NOTE — BH INPATIENT PSYCHIATRY PROGRESS NOTE - CURRENT MEDICATION
MEDICATIONS  (STANDING):  multivitamin 1 Tablet(s) Oral <User Schedule>  OLANZapine Injectable 2.5 milliGRAM(s) IntraMuscular <User Schedule>  risperiDONE   Solution 1.5 milliGRAM(s) Oral <User Schedule>    MEDICATIONS  (PRN):  acetaminophen     Tablet .. 650 milliGRAM(s) Oral every 6 hours PRN Moderate Pain (4 - 6)  aluminum hydroxide/magnesium hydroxide/simethicone Suspension 30 milliLiter(s) Oral every 4 hours PRN Dyspepsia  magnesium hydroxide Suspension 30 milliLiter(s) Oral daily PRN Constipation  OLANZapine 2.5 milliGRAM(s) Oral every 8 hours PRN agitation  traZODone 50 milliGRAM(s) Oral at bedtime PRN insomnia

## 2022-12-29 NOTE — BH INPATIENT PSYCHIATRY PROGRESS NOTE - NSBHCHARTREVIEWVS_PSY_A_CORE FT
Vital Signs Last 24 Hrs  T(C): 36.7 (12-28-22 @ 16:44), Max: 36.7 (12-28-22 @ 16:44)  T(F): 98 (12-28-22 @ 16:44), Max: 98 (12-28-22 @ 16:44)  HR: 67 (12-28-22 @ 16:44) (67 - 67)  BP: 111/66 (12-28-22 @ 16:44) (111/66 - 111/66)  BP(mean): --  RR: 18 (12-28-22 @ 16:44) (18 - 18)  SpO2: 99% (12-28-22 @ 16:44) (99% - 99%)

## 2022-12-30 PROCEDURE — 99232 SBSQ HOSP IP/OBS MODERATE 35: CPT

## 2022-12-30 RX ADMIN — Medication 0.25 MILLIGRAM(S): at 15:34

## 2022-12-30 RX ADMIN — RISPERIDONE 1.5 MILLIGRAM(S): 4 TABLET ORAL at 15:35

## 2022-12-30 RX ADMIN — Medication 1 TABLET(S): at 15:34

## 2022-12-30 NOTE — BH INPATIENT PSYCHIATRY PROGRESS NOTE - CURRENT MEDICATION
MEDICATIONS  (STANDING):  LORazepam    Concentrate 0.25 milliGRAM(s) Oral <User Schedule>  multivitamin 1 Tablet(s) Oral <User Schedule>  OLANZapine Injectable 2.5 milliGRAM(s) IntraMuscular <User Schedule>  risperiDONE   Solution 1.5 milliGRAM(s) Oral <User Schedule>    MEDICATIONS  (PRN):  acetaminophen     Tablet .. 650 milliGRAM(s) Oral every 6 hours PRN Moderate Pain (4 - 6)  aluminum hydroxide/magnesium hydroxide/simethicone Suspension 30 milliLiter(s) Oral every 4 hours PRN Dyspepsia  magnesium hydroxide Suspension 30 milliLiter(s) Oral daily PRN Constipation  OLANZapine 2.5 milliGRAM(s) Oral every 8 hours PRN agitation  traZODone 50 milliGRAM(s) Oral at bedtime PRN insomnia

## 2022-12-30 NOTE — BH INPATIENT PSYCHIATRY PROGRESS NOTE - NSBHMETABOLIC_PSY_ALL_CORE_FT
BMI: BMI (kg/m2): 21.8 (11-30-22 @ 03:08)  HbA1c: A1C with Estimated Average Glucose Result: 5.4 % (12-01-22 @ 07:21)    Glucose:   BP: 110/65 (12-30-22 @ 10:00) (110/65 - 188/69)  Lipid Panel: Date/Time: 12-01-22 @ 07:21  Cholesterol, Serum: 168  Direct LDL: --  HDL Cholesterol, Serum: 89  Total Cholesterol/HDL Ration Measurement: --  Triglycerides, Serum: 29

## 2022-12-30 NOTE — BH INPATIENT PSYCHIATRY PROGRESS NOTE - NSBHFUPINTERVALHXFT_PSY_A_CORE
Patient visible on the unit, walking around, appropriately interacting with peers and fully engaging in groups. On approach she states 'I'm doing well', pleasant but guarded. Per staff report pt is very engaged in groups, better related and encouraging of peers. Suspicious of food, but has been eating meals. No si/hi/avh endorsed.  Plan to add Klonopin 0.25mg concentrate added to regimen for increase in irritability and agitation following daily med administration

## 2022-12-30 NOTE — BH INPATIENT PSYCHIATRY PROGRESS NOTE - NSBHASSESSSUMMFT_PSY_ALL_CORE
This is a 76yo  female, , retired, domiciled alone in [place], [place]. No pertinent medical hx. Patient denies psychiatric hx, but documentation indicates a history of  paranoid delusions, with one prior admission at [facility] for a stay of 2 months. She has since been non-adherent to her medication; risperidone. No history of SI/plan/attempt, no history of substance/etoh abuse. She presents to ED c/o being stalked by neighbor and son-in-law in addition to complaints of intermittent chest pain x4 months. Patient was seen at [facility] yesterday for same complaint of chest pain and released form ED Of note, pt was missing for 3 days prior to being found at [facility]. Admitted to [address] [place] [address] status.    Patient noted to be delusional, MMSE of 22/30 significant for cognitive impairment.     Dementia work up: included head CT, HIV, Vitb12, RPR, TSH, Folate  Risperdal 1mg qhs implemented and titrated as necessary  Results of Head CT:   ACC: [id number] EXAM:  CT BRAIN                          PROCEDURE DATE:  12/01/2022          INTERPRETATION:  PROCEDURE: CT head without intravenous contrast    INDICATION: Acute onset of psychosis    TECHNIQUE: Multiple axial images were obtained at 5 mm intervals from the   skull base to the vertex. Sagittal and coronal reformatted images were   obtained from the axial data set. The images were reviewed in brain and   bone windows.    COMPARISON: None  FINDINGS: The CT examination demonstrates is limited secondary to to   extensive streak artifact from multiple overlying metallic objects on the   patient's head. The ventricles, cisternal spaces, and cortical sulci   grossly appear within normal limits. There is no midline shift or extra   axial collections. The gray white differentiation appears within normal   limits. There is no intracranial hemorrhage or acute transcortical   infarct. There is extensive hypodensity within the periventricular white   matter which is nonspecific and may represent the sequela of small vessel   ischemic disease in this patient.    The bony windows demonstrates no fractures. The visualized paranasal   sinuses are within normal limits. The mastoid air cells are well aerated.    IMPRESSION: Limited exam secondary to extensive streak artifact. No   definite intracranial hemorrhage or acute transcortical infarct.   Extensive small vessel ischemic disease.      12/2/22: Patient is taking Risperdal 0.5mg qhs and multivitamin, however does not recall that she is taking medications. Endorsed having chest tightness/pain when discussing topic of her 'stalker', objectively does not appear to be in any distress. EKG ordered and reviewed, significant for 1st degree AV block. CTH also reviewd (see above) significant for 'small vessel ischemic disease.' No si/hi/avh or PI endorsed  12/5/22: Patient continues to refuse medications. Denies any psychiatric illness or cognitive decline, she states "there is nothing wrong with me." Denies SI/HI/AVH. Collateral obtained from daughter who shares concern for pt deteriorating cognition/memory  12/6/22- Patient's mood was very angry and irritable. She believes the staff here is "playing games" and drugging her food. She demanded to speak with her daughter as soon as possible because she feels unsafe in the unit. Refusing medications. No insight into illness and need for medications. Pt does not have capacity  12/7 Patient noted to have labile mood, is focused on select staff and a male peer stating that they are stalker her and coming into and out of her room. Observed eating lunch today. Continues to refuse all medications. No acute medical concerns. Dispo planning in effect. Per evening staff pt having regular outbursts at night, not currently requiring medications  12/8 Patient remains unchanged, irritable, labile, non-compliant with medication regimen. Forgetful. No acute medical issues, is eating meals  12/9 Patient visible on the unit, attending multiple groups, appropriately interacting with peers, eating, no meds  12/12 Patient going to multiple groups, pleasant with select peers and staff, continues to refuse all medications, remains paranoid and delusion. Will pursue TOO  12/13 Patient remains non compliant with medication, attending select groups, irritable edge, continues to verbalize feeling that a select male peer is stalking and harassing her. TOO in progress  12/14 Patient noted to be irritable and dismissive. She is focused on going to court today for discharge. Refusing all medications, paranoid and delusional  12/15 Patient did go to court yesterday for her release. Retention granted to the hospital. TOO in progress  12/16 Suspicious, guarded, refusing medications, visible on the unit, going to groups.  12/19 Irritable, confused, TOO in progress, visible on the unit  12/20 Refusing meds, very confused, suspicious and irritable  12/21 Going to groups, focused on discharged, forgetful, confused, pleasant with select peers, not taking meds  12/22 Irritable, suspicious, forgetful, received zyprexa 2.5mg IM last night  12/23 Took po risperdal last night with much encouragement, risperdal qhs changed to afternoon  12/27 Patient has been compliant with risperdal solution, no side effects or adverse reactions reported or observed, will plan to increase to 1.5mg  12/28 Accepting court ordered risperdal, remains paranoid, suspicious and devaluing of staff  12/29 Attending all groups, appropriately engaging in groups, gets along well with peers, taking medication  12/30 Very social with peers, going to groups, appropriately engaging, suspicious of food, but is eating

## 2022-12-30 NOTE — BH INPATIENT PSYCHIATRY PROGRESS NOTE - NSBHCHARTREVIEWVS_PSY_A_CORE FT
Vital Signs Last 24 Hrs  T(C): 37.1 (12-30-22 @ 10:00), Max: 37.1 (12-30-22 @ 10:00)  T(F): 98.7 (12-30-22 @ 10:00), Max: 98.7 (12-30-22 @ 10:00)  HR: 60 (12-30-22 @ 10:00) (60 - 83)  BP: 110/65 (12-30-22 @ 10:00) (110/65 - 188/69)  BP(mean): --  RR: 18 (12-30-22 @ 10:00) (18 - 18)  SpO2: 98% (12-30-22 @ 10:00) (96% - 100%)

## 2022-12-30 NOTE — BH INPATIENT PSYCHIATRY PROGRESS NOTE - NSICDXBHTERTIARYDX_PSY_ALL_CORE
R/O Delusional disorder   F22  R/O Dementia with psychosis   F03.92  

## 2022-12-31 RX ADMIN — OLANZAPINE 2.5 MILLIGRAM(S): 15 TABLET, FILM COATED ORAL at 15:50

## 2023-01-01 RX ADMIN — RISPERIDONE 1.5 MILLIGRAM(S): 4 TABLET ORAL at 13:23

## 2023-01-01 RX ADMIN — Medication 1 TABLET(S): at 13:23

## 2023-01-01 RX ADMIN — Medication 0.25 MILLIGRAM(S): at 13:23

## 2023-01-02 RX ADMIN — Medication 0.25 MILLIGRAM(S): at 12:52

## 2023-01-02 RX ADMIN — RISPERIDONE 1.5 MILLIGRAM(S): 4 TABLET ORAL at 12:53

## 2023-01-02 RX ADMIN — Medication 1 TABLET(S): at 12:52

## 2023-01-03 PROCEDURE — 99231 SBSQ HOSP IP/OBS SF/LOW 25: CPT

## 2023-01-03 RX ADMIN — RISPERIDONE 1.5 MILLIGRAM(S): 4 TABLET ORAL at 14:03

## 2023-01-03 RX ADMIN — Medication 0.25 MILLIGRAM(S): at 14:04

## 2023-01-03 RX ADMIN — Medication 1 TABLET(S): at 14:03

## 2023-01-03 NOTE — BH INPATIENT PSYCHIATRY PROGRESS NOTE - NSBHCHARTREVIEWVS_PSY_A_CORE FT
Vital Signs Last 24 Hrs  T(C): 36.6 (01-03-23 @ 08:40), Max: 36.6 (01-03-23 @ 08:40)  T(F): 97.9 (01-03-23 @ 08:40), Max: 97.9 (01-03-23 @ 08:40)  HR: 63 (01-03-23 @ 08:40) (63 - 63)  BP: 152/61 (01-03-23 @ 08:40) (152/61 - 152/61)  BP(mean): --  RR: 18 (01-03-23 @ 08:40) (18 - 18)  SpO2: 97% (01-03-23 @ 08:40) (97% - 97%)

## 2023-01-03 NOTE — BH INPATIENT PSYCHIATRY PROGRESS NOTE - NSBHASSESSSUMMFT_PSY_ALL_CORE
This is a 78yo  female, , retired, domiciled alone in [place], [place]. No pertinent medical hx. Patient denies psychiatric hx, but documentation indicates a history of  paranoid delusions, with one prior admission at [facility] for a stay of 2 months. She has since been non-adherent to her medication; risperidone. No history of SI/plan/attempt, no history of substance/etoh abuse. She presents to ED c/o being stalked by neighbor and son-in-law in addition to complaints of intermittent chest pain x4 months. Patient was seen at [facility] yesterday for same complaint of chest pain and released form ED Of note, pt was missing for 3 days prior to being found at [facility]. Admitted to [address] [place] [address] status.    Patient noted to be delusional, MMSE of 22/30 significant for cognitive impairment.     Dementia work up: included head CT, HIV, Vitb12, RPR, TSH, Folate  Risperdal 1mg qhs implemented and titrated as necessary  Results of Head CT:   ACC: [id number] EXAM:  CT BRAIN                          PROCEDURE DATE:  12/01/2022          INTERPRETATION:  PROCEDURE: CT head without intravenous contrast    INDICATION: Acute onset of psychosis    TECHNIQUE: Multiple axial images were obtained at 5 mm intervals from the   skull base to the vertex. Sagittal and coronal reformatted images were   obtained from the axial data set. The images were reviewed in brain and   bone windows.    COMPARISON: None  FINDINGS: The CT examination demonstrates is limited secondary to to   extensive streak artifact from multiple overlying metallic objects on the   patient's head. The ventricles, cisternal spaces, and cortical sulci   grossly appear within normal limits. There is no midline shift or extra   axial collections. The gray white differentiation appears within normal   limits. There is no intracranial hemorrhage or acute transcortical   infarct. There is extensive hypodensity within the periventricular white   matter which is nonspecific and may represent the sequela of small vessel   ischemic disease in this patient.    The bony windows demonstrates no fractures. The visualized paranasal   sinuses are within normal limits. The mastoid air cells are well aerated.    IMPRESSION: Limited exam secondary to extensive streak artifact. No   definite intracranial hemorrhage or acute transcortical infarct.   Extensive small vessel ischemic disease.      12/2/22: Patient is taking Risperdal 0.5mg qhs and multivitamin, however does not recall that she is taking medications. Endorsed having chest tightness/pain when discussing topic of her 'stalker', objectively does not appear to be in any distress. EKG ordered and reviewed, significant for 1st degree AV block. CTH also reviewd (see above) significant for 'small vessel ischemic disease.' No si/hi/avh or PI endorsed  12/5/22: Patient continues to refuse medications. Denies any psychiatric illness or cognitive decline, she states "there is nothing wrong with me." Denies SI/HI/AVH. Collateral obtained from daughter who shares concern for pt deteriorating cognition/memory  12/6/22- Patient's mood was very angry and irritable. She believes the staff here is "playing games" and drugging her food. She demanded to speak with her daughter as soon as possible because she feels unsafe in the unit. Refusing medications. No insight into illness and need for medications. Pt does not have capacity  12/7 Patient noted to have labile mood, is focused on select staff and a male peer stating that they are stalker her and coming into and out of her room. Observed eating lunch today. Continues to refuse all medications. No acute medical concerns. Dispo planning in effect. Per evening staff pt having regular outbursts at night, not currently requiring medications  12/8 Patient remains unchanged, irritable, labile, non-compliant with medication regimen. Forgetful. No acute medical issues, is eating meals  12/9 Patient visible on the unit, attending multiple groups, appropriately interacting with peers, eating, no meds  12/12 Patient going to multiple groups, pleasant with select peers and staff, continues to refuse all medications, remains paranoid and delusion. Will pursue TOO  12/13 Patient remains non compliant with medication, attending select groups, irritable edge, continues to verbalize feeling that a select male peer is stalking and harassing her. TOO in progress  12/14 Patient noted to be irritable and dismissive. She is focused on going to court today for discharge. Refusing all medications, paranoid and delusional  12/15 Patient did go to court yesterday for her release. Retention granted to the hospital. TOO in progress  12/16 Suspicious, guarded, refusing medications, visible on the unit, going to groups.  12/19 Irritable, confused, TOO in progress, visible on the unit  12/20 Refusing meds, very confused, suspicious and irritable  12/21 Going to groups, focused on discharged, forgetful, confused, pleasant with select peers, not taking meds  12/22 Irritable, suspicious, forgetful, received zyprexa 2.5mg IM last night  12/23 Took po risperdal last night with much encouragement, risperdal qhs changed to afternoon  12/27 Patient has been compliant with risperdal solution, no side effects or adverse reactions reported or observed, will plan to increase to 1.5mg  12/28 Accepting court ordered risperdal, remains paranoid, suspicious and devaluing of staff  12/29 Attending all groups, appropriately engaging in groups, gets along well with peers, taking medication  12/30 Very social with peers, going to groups, appropriately engaging, suspicious of food, but is eating  1/3 Very engaging and social with peers, continues to go to groups, interactive and appropriate with other patients, taking medicaitons

## 2023-01-03 NOTE — BH INPATIENT PSYCHIATRY PROGRESS NOTE - NSBHMETABOLIC_PSY_ALL_CORE_FT
BMI: BMI (kg/m2): 21.8 (11-30-22 @ 03:08)  HbA1c: A1C with Estimated Average Glucose Result: 5.4 % (12-01-22 @ 07:21)    Glucose:   BP: 152/61 (01-03-23 @ 08:40) (152/61 - 188/62)  Lipid Panel: Date/Time: 12-01-22 @ 07:21  Cholesterol, Serum: 168  Direct LDL: --  HDL Cholesterol, Serum: 89  Total Cholesterol/HDL Ration Measurement: --  Triglycerides, Serum: 29

## 2023-01-03 NOTE — CHART NOTE - NSCHARTNOTEFT_GEN_A_CORE
Admitting Diagnosis:   Patient is a 77y old  Female who presents with a chief complaint of PARANOIA    PAST MEDICAL & SURGICAL HISTORY:  No pertinent past medical history  No significant past surgical history    Current Nutrition Order: Regular diet w/Ensure Enlive ONS Bid (350kcal, 20gPRO per serving)    PO Intake: Good (%) [ x ]  Fair (50-75%) [   ] Poor (<25%) [   ]    GI Issues: no c/o N/V/D/C per pt and nursing staff; last BM in the past 1-2 days per pt    Pain: denies pain/discomfort per chart    Skin Integrity: within normal limits at this time per chart; no pressure ulcers noted; Celestino: 22    Labs:   No new labs    MEDICATIONS  (PRN):  acetaminophen     Tablet .. 650 milliGRAM(s) Oral every 6 hours PRN Moderate Pain (4 - 6)  aluminum hydroxide/magnesium hydroxide/simethicone Suspension 30 milliLiter(s) Oral every 4 hours PRN Dyspepsia  magnesium hydroxide Suspension 30 milliLiter(s) Oral daily PRN Constipation  OLANZapine 2.5 milliGRAM(s) Oral every 8 hours PRN agitation  traZODone 50 milliGRAM(s) Oral at bedtime PRN insomnia  MEDICATIONS  (STANDING):  LORazepam    Concentrate 0.25 milliGRAM(s) Oral <User Schedule>  multivitamin 1 Tablet(s) Oral <User Schedule>  OLANZapine Injectable 2.5 milliGRAM(s) IntraMuscular <User Schedule>  risperiDONE   Solution 1.5 milliGRAM(s) Oral <User Schedule>    MEDICATIONS  (PRN):  acetaminophen     Tablet .. 650 milliGRAM(s) Oral every 6 hours PRN Moderate Pain (4 - 6)  aluminum hydroxide/magnesium hydroxide/simethicone Suspension 30 milliLiter(s) Oral every 4 hours PRN Dyspepsia  magnesium hydroxide Suspension 30 milliLiter(s) Oral daily PRN Constipation  OLANZapine 2.5 milliGRAM(s) Oral every 8 hours PRN agitation  traZODone 50 milliGRAM(s) Oral at bedtime PRN insomnia      Dosing Anthropometrics:  Height for BMI (FEET)	5 Feet  Height for BMI (INCHES)	2 Inch(s)  Height for BMI (CM)	157.48 Centimeter(s)  Weight for BMI (lbs)	119 lb  Weight for BMI (kg)	54 kg  Body Mass Index	              21.7    Weight Change:   new weight noted on 12/20/22 in EMR: 55.8kg (123#); 3# decrease in wt since 12/06/22  new weight noted on 12/06/22 in EMR: 57.2kg (126#); this wt increase of 7# is noteworthy--? accuracy of wt r/t 7# wt gain in 7 days    Nutrition Focused Physical Exam: please refer to initial nutrition assessment on 12/03/22    Estimated energy needs:   Weight used for calculations	current weight  Estimated Energy Needs Weight (lbs)	123 lb  Estimated Energy Needs Weight (kg)	55.8 kg  kcal: 20-25kcal/kg = 1116-1395kcal/day   protein: 1.0-1.2g/kg = 56-67g/day   fluids: 30-35mL/kg = 1674-1953mL/day   Other Calculations	%IBW; 112%; ABW used to calculate estimated needs d/t pt being between 80 and 120% IBW. Adjusted for age (pt has been consistently eating and fasting has stopped per nursing staff thus no longer at risk currently for PCM).    Subjective: 76yo  female, , retired, domiciled alone in [place], [place]. No pertinent medical hx. Patient denies psychiatric hx, but documentation indicates a history of  paranoid delusions, with one prior admission at [facility] for a stay of 2 months. Pt presented to ED with c/o chest painx4 months, and being stalked by neighbor and son-in-law. Patient was seen at [facility] yesterday for same complaint of chest pain and released. Of note, pt was missing for 3 days prior to being found at [facility]. Admitted to [facility] status.     Pt assessed for nutrition follow up eval; RD spoke to pt on 8UR. Pt has been eating well overall, ~75% per pt and per nursing staff. Pt enjoys Ensure ONS. No c/o N/V/D/C. RD will continue to monitor trends and follow.     Nutrition Dx: no nutrition diagnosis currently; as pt no longer fasts and thus has been educated on adequate PO intakes, risks of skipping meals/irregular meals/fasting, pt's current PO intakes good overall (75%), previous PES no longer applies.    Goal: Pt to consistently meet at least 75% of EER via tolerated route that is consistent with GOC during hospital stay;     Recommendations:  1. Continue with Regular diet and Ensure Enlive ONS.  2. Monitor %PO intake, diet tolerance.   >>Encourage PO intake as appropriate   3. Monitor lytes, replete prn. Monitor BG.   4. Trend weekly wts. Monitor skin integrity, s/sx GI distress.   5. Pain/BM regimen per team   6. RD diet edu reinforcement prn.   7. RD to remain available for additional nutrition interventions as needed.    Education: RD encouraged pt to continue to maintain good PO intakes as able. RD to follow up per protocol.    Risk Level: High [   ] Moderate [ x ] Low [   ].

## 2023-01-03 NOTE — BH INPATIENT PSYCHIATRY PROGRESS NOTE - NSBHFUPINTERVALHXFT_PSY_A_CORE
Patient visible on the unit, walking around, appropriately interacting with peers and fully engaging in groups. Is supportive of peers and encouraging. Remains forgetful, asking staff numerous times for contact information for her daughter. No medical concerns. Fair sleep and appetite. Is taking medications.

## 2023-01-04 PROCEDURE — 99231 SBSQ HOSP IP/OBS SF/LOW 25: CPT

## 2023-01-04 RX ADMIN — Medication 1 TABLET(S): at 12:08

## 2023-01-04 RX ADMIN — RISPERIDONE 1.5 MILLIGRAM(S): 4 TABLET ORAL at 12:08

## 2023-01-04 NOTE — BH INPATIENT PSYCHIATRY PROGRESS NOTE - NSBHFUPINTERVALHXFT_PSY_A_CORE
Patient visible on the unit, visualized watching tv and lounging with peers. Has no complaints, pleasant, superficial, is focused on speaking with SW today. Continues to appropriately interact with peers, fully engaging in groups. Slept last night.  Per staff report pt has been taking medications, eating meals, remains forgetful, at some times is paranoid, but not distressed.

## 2023-01-04 NOTE — BH INPATIENT PSYCHIATRY PROGRESS NOTE - NSBHCHARTREVIEWVS_PSY_A_CORE FT
Vital Signs Last 24 Hrs  T(C): 36.4 (01-03-23 @ 17:56), Max: 36.4 (01-03-23 @ 17:56)  T(F): 97.5 (01-03-23 @ 17:56), Max: 97.5 (01-03-23 @ 17:56)  HR: 58 (01-04-23 @ 09:00) (58 - 77)  BP: 126/74 (01-04-23 @ 09:00) (126/74 - 153/71)  BP(mean): --  RR: 18 (01-04-23 @ 09:00) (18 - 18)  SpO2: 98% (01-04-23 @ 09:00) (98% - 100%)

## 2023-01-04 NOTE — BH TREATMENT PLAN - NSTXDISORGINTERMD_PSY_ALL_CORE
Psychopharmacology x15 minutes, Risperdal titration continued, TOO with the goal of MALLOY will be completed.  Psychopharmacology x15 minutes, Risperdal titration continued, TOO and giving Invega Sustenna, ativan for mood stability.

## 2023-01-04 NOTE — BH TREATMENT PLAN - NSTXOTHERINTERMD_PSY_ALL_CORE
Psychopharmacology x15 minutes, Risperdal titration continuing, TOO with the goal of MALLOY will be completed.   PT and OT consult

## 2023-01-04 NOTE — BH INPATIENT PSYCHIATRY PROGRESS NOTE - NSBHMETABOLIC_PSY_ALL_CORE_FT
BMI: BMI (kg/m2): 21.8 (11-30-22 @ 03:08)  HbA1c: A1C with Estimated Average Glucose Result: 5.4 % (12-01-22 @ 07:21)    Glucose:   BP: 126/74 (01-04-23 @ 09:00) (126/74 - 188/62)  Lipid Panel: Date/Time: 12-01-22 @ 07:21  Cholesterol, Serum: 168  Direct LDL: --  HDL Cholesterol, Serum: 89  Total Cholesterol/HDL Ration Measurement: --  Triglycerides, Serum: 29

## 2023-01-04 NOTE — BH TREATMENT PLAN - NSTXPSYCHOINTERMD_PSY_ALL_CORE
Psychopharmacology x15 minutes, Risperdal titration continuing, TOO with the goal of MALLOY will be completed.  Psychopharmacology x15 minutes, Risperdal titration continuing, TOO, will start invega sustenna

## 2023-01-04 NOTE — BH INPATIENT PSYCHIATRY PROGRESS NOTE - NSBHASSESSSUMMFT_PSY_ALL_CORE
This is a 76yo  female, , retired, domiciled alone in [place], [place]. No pertinent medical hx. Patient denies psychiatric hx, but documentation indicates a history of  paranoid delusions, with one prior admission at [facility] for a stay of 2 months. She has since been non-adherent to her medication; risperidone. No history of SI/plan/attempt, no history of substance/etoh abuse. She presents to ED c/o being stalked by neighbor and son-in-law in addition to complaints of intermittent chest pain x4 months. Patient was seen at [facility] [place] yesterday for same complaint of chest pain and released form ED Of note, pt was missing for 3 days prior to being found at [facility] [place] [facility]. Admitted to [address] [place] [address] status.    Patient noted to be delusional, MMSE of 22/30 significant for cognitive impairment.     Dementia work up: included head CT, HIV, Vitb12, RPR, TSH, Folate  Risperdal 1mg qhs implemented and titrated as necessary  Results of Head CT:   ACC: [id number] EXAM:  CT BRAIN                          PROCEDURE DATE:  12/01/2022          INTERPRETATION:  PROCEDURE: CT head without intravenous contrast    INDICATION: Acute onset of psychosis    TECHNIQUE: Multiple axial images were obtained at 5 mm intervals from the   skull base to the vertex. Sagittal and coronal reformatted images were   obtained from the axial data set. The images were reviewed in brain and   bone windows.    COMPARISON: None  FINDINGS: The CT examination demonstrates is limited secondary to to   extensive streak artifact from multiple overlying metallic objects on the   patient's head. The ventricles, cisternal spaces, and cortical sulci   grossly appear within normal limits. There is no midline shift or extra   axial collections. The gray white differentiation appears within normal   limits. There is no intracranial hemorrhage or acute transcortical   infarct. There is extensive hypodensity within the periventricular white   matter which is nonspecific and may represent the sequela of small vessel   ischemic disease in this patient.    The bony windows demonstrates no fractures. The visualized paranasal   sinuses are within normal limits. The mastoid air cells are well aerated.    IMPRESSION: Limited exam secondary to extensive streak artifact. No   definite intracranial hemorrhage or acute transcortical infarct.   Extensive small vessel ischemic disease.      12/2/22: Patient is taking Risperdal 0.5mg qhs and multivitamin, however does not recall that she is taking medications. Endorsed having chest tightness/pain when discussing topic of her 'stalker', objectively does not appear to be in any distress. EKG ordered and reviewed, significant for 1st degree AV block. CTH also reviewd (see above) significant for 'small vessel ischemic disease.' No si/hi/avh or PI endorsed  12/5/22: Patient continues to refuse medications. Denies any psychiatric illness or cognitive decline, she states "there is nothing wrong with me." Denies SI/HI/AVH. Collateral obtained from daughter who shares concern for pt deteriorating cognition/memory  12/6/22- Patient's mood was very angry and irritable. She believes the staff here is "playing games" and drugging her food. She demanded to speak with her daughter as soon as possible because she feels unsafe in the unit. Refusing medications. No insight into illness and need for medications. Pt does not have capacity  12/7 Patient noted to have labile mood, is focused on select staff and a male peer stating that they are stalker her and coming into and out of her room. Observed eating lunch today. Continues to refuse all medications. No acute medical concerns. Dispo planning in effect. Per evening staff pt having regular outbursts at night, not currently requiring medications  12/8 Patient remains unchanged, irritable, labile, non-compliant with medication regimen. Forgetful. No acute medical issues, is eating meals  12/9 Patient visible on the unit, attending multiple groups, appropriately interacting with peers, eating, no meds  12/12 Patient going to multiple groups, pleasant with select peers and staff, continues to refuse all medications, remains paranoid and delusion. Will pursue TOO  12/13 Patient remains non compliant with medication, attending select groups, irritable edge, continues to verbalize feeling that a select male peer is stalking and harassing her. TOO in progress  12/14 Patient noted to be irritable and dismissive. She is focused on going to court today for discharge. Refusing all medications, paranoid and delusional  12/15 Patient did go to court yesterday for her release. Retention granted to the hospital. TOO in progress  12/16 Suspicious, guarded, refusing medications, visible on the unit, going to groups.  12/19 Irritable, confused, TOO in progress, visible on the unit  12/20 Refusing meds, very confused, suspicious and irritable  12/21 Going to groups, focused on discharged, forgetful, confused, pleasant with select peers, not taking meds  12/22 Irritable, suspicious, forgetful, received zyprexa 2.5mg IM last night  12/23 Took po risperdal last night with much encouragement, risperdal qhs changed to afternoon  12/27 Patient has been compliant with risperdal solution, no side effects or adverse reactions reported or observed, will plan to increase to 1.5mg  12/28 Accepting court ordered risperdal, remains paranoid, suspicious and devaluing of staff  12/29 Attending all groups, appropriately engaging in groups, gets along well with peers, taking medication  12/30 Very social with peers, going to groups, appropriately engaging, suspicious of food, but is eating  1/3 Very engaging and social with peers, continues to go to groups, interactive and appropriate with other patients, taking medications  1/4 Remains appropriate, fair, social, forgetful, taking meds

## 2023-01-05 PROCEDURE — 99232 SBSQ HOSP IP/OBS MODERATE 35: CPT

## 2023-01-05 RX ORDER — RISPERIDONE 4 MG/1
2 TABLET ORAL
Refills: 0 | Status: DISCONTINUED | OUTPATIENT
Start: 2023-01-05 | End: 2023-01-17

## 2023-01-05 RX ORDER — PALIPERIDONE 1.5 MG/1
234 TABLET, EXTENDED RELEASE ORAL
Refills: 0 | Status: DISCONTINUED | OUTPATIENT
Start: 2023-01-05 | End: 2023-01-06

## 2023-01-05 RX ORDER — PALIPERIDONE 1.5 MG/1
156 TABLET, EXTENDED RELEASE ORAL
Refills: 0 | Status: DISCONTINUED | OUTPATIENT
Start: 2023-01-10 | End: 2023-01-11

## 2023-01-05 RX ADMIN — Medication 1 TABLET(S): at 14:48

## 2023-01-05 RX ADMIN — RISPERIDONE 2 MILLIGRAM(S): 4 TABLET ORAL at 14:49

## 2023-01-05 RX ADMIN — PALIPERIDONE 234 MILLIGRAM(S): 1.5 TABLET, EXTENDED RELEASE ORAL at 14:49

## 2023-01-05 RX ADMIN — Medication 0.25 MILLIGRAM(S): at 14:42

## 2023-01-05 NOTE — BH INPATIENT PSYCHIATRY PROGRESS NOTE - CURRENT MEDICATION
MEDICATIONS  (STANDING):  LORazepam    Concentrate 0.25 milliGRAM(s) Oral <User Schedule>  multivitamin 1 Tablet(s) Oral <User Schedule>  OLANZapine Injectable 2.5 milliGRAM(s) IntraMuscular <User Schedule>  paliperidone Injectable, Long Acting 234 milliGRAM(s) IntraMuscular <User Schedule>  risperiDONE   Solution 2 milliGRAM(s) Oral <User Schedule>    MEDICATIONS  (PRN):  acetaminophen     Tablet .. 650 milliGRAM(s) Oral every 6 hours PRN Moderate Pain (4 - 6)  aluminum hydroxide/magnesium hydroxide/simethicone Suspension 30 milliLiter(s) Oral every 4 hours PRN Dyspepsia  magnesium hydroxide Suspension 30 milliLiter(s) Oral daily PRN Constipation  OLANZapine 2.5 milliGRAM(s) Oral every 8 hours PRN agitation  traZODone 50 milliGRAM(s) Oral at bedtime PRN insomnia

## 2023-01-05 NOTE — BH INPATIENT PSYCHIATRY PROGRESS NOTE - NSBHFUPINTERVALHXFT_PSY_A_CORE
Patient visible on the unit, attending groups, today noted to be focused on speaking with SW, has no complaints. Per staff report pt did sleep last night and has been eating well. Risperdal to be increased to 2mg qd and pt to receive first loading dose of Invega Sustenna 234mg.   Writer did speak with pt's guardian and daughter Tuesday to inform her of transition to [facility].

## 2023-01-05 NOTE — BH INPATIENT PSYCHIATRY PROGRESS NOTE - NSBHASSESSSUMMFT_PSY_ALL_CORE
This is a 76yo  female, , retired, domiciled alone in [place], [place]. No pertinent medical hx. Patient denies psychiatric hx, but documentation indicates a history of  paranoid delusions, with one prior admission at [facility] for a stay of 2 months. She has since been non-adherent to her medication; risperidone. No history of SI/plan/attempt, no history of substance/etoh abuse. She presents to ED c/o being stalked by neighbor and son-in-law in addition to complaints of intermittent chest pain x4 months. Patient was seen at [facility] yesterday for same complaint of chest pain and released form ED Of note, pt was missing for 3 days prior to being found at [facility]. Admitted to [address] [place] [address] status.    Patient noted to be delusional, MMSE of 22/30 significant for cognitive impairment.     Dementia work up: included head CT, HIV, Vitb12, RPR, TSH, Folate  Risperdal soluttion 2mg qd @1300  Ativan solution 0.25mg qd @1300    Results of Head CT:   ACC: [id number] EXAM:  CT BRAIN                          PROCEDURE DATE:  12/01/2022          INTERPRETATION:  PROCEDURE: CT head without intravenous contrast    INDICATION: Acute onset of psychosis    TECHNIQUE: Multiple axial images were obtained at 5 mm intervals from the   skull base to the vertex. Sagittal and coronal reformatted images were   obtained from the axial data set. The images were reviewed in brain and   bone windows.    COMPARISON: None  FINDINGS: The CT examination demonstrates is limited secondary to to   extensive streak artifact from multiple overlying metallic objects on the   patient's head. The ventricles, cisternal spaces, and cortical sulci   grossly appear within normal limits. There is no midline shift or extra   axial collections. The gray white differentiation appears within normal   limits. There is no intracranial hemorrhage or acute transcortical   infarct. There is extensive hypodensity within the periventricular white   matter which is nonspecific and may represent the sequela of small vessel   ischemic disease in this patient.    The bony windows demonstrates no fractures. The visualized paranasal   sinuses are within normal limits. The mastoid air cells are well aerated.    IMPRESSION: Limited exam secondary to extensive streak artifact. No   definite intracranial hemorrhage or acute transcortical infarct.   Extensive small vessel ischemic disease.      12/2/22: Patient is taking Risperdal 0.5mg qhs and multivitamin, however does not recall that she is taking medications. Endorsed having chest tightness/pain when discussing topic of her 'stalker', objectively does not appear to be in any distress. EKG ordered and reviewed, significant for 1st degree AV block. CTH also reviewd (see above) significant for 'small vessel ischemic disease.' No si/hi/avh or PI endorsed  12/5/22: Patient continues to refuse medications. Denies any psychiatric illness or cognitive decline, she states "there is nothing wrong with me." Denies SI/HI/AVH. Collateral obtained from daughter who shares concern for pt deteriorating cognition/memory  12/6/22- Patient's mood was very angry and irritable. She believes the staff here is "playing games" and drugging her food. She demanded to speak with her daughter as soon as possible because she feels unsafe in the unit. Refusing medications. No insight into illness and need for medications. Pt does not have capacity  12/7 Patient noted to have labile mood, is focused on select staff and a male peer stating that they are stalker her and coming into and out of her room. Observed eating lunch today. Continues to refuse all medications. No acute medical concerns. Dispo planning in effect. Per evening staff pt having regular outbursts at night, not currently requiring medications  12/8 Patient remains unchanged, irritable, labile, non-compliant with medication regimen. Forgetful. No acute medical issues, is eating meals  12/9 Patient visible on the unit, attending multiple groups, appropriately interacting with peers, eating, no meds  12/12 Patient going to multiple groups, pleasant with select peers and staff, continues to refuse all medications, remains paranoid and delusion. Will pursue TOO  12/13 Patient remains non compliant with medication, attending select groups, irritable edge, continues to verbalize feeling that a select male peer is stalking and harassing her. TOO in progress  12/14 Patient noted to be irritable and dismissive. She is focused on going to court today for discharge. Refusing all medications, paranoid and delusional  12/15 Patient did go to court yesterday for her release. Retention granted to the hospital. TOO in progress  12/16 Suspicious, guarded, refusing medications, visible on the unit, going to groups.  12/19 Irritable, confused, TOO in progress, visible on the unit  12/20 Refusing meds, very confused, suspicious and irritable  12/21 Going to groups, focused on discharged, forgetful, confused, pleasant with select peers, not taking meds  12/22 Irritable, suspicious, forgetful, received zyprexa 2.5mg IM last night  12/23 Took po risperdal last night with much encouragement, risperdal qhs changed to afternoon  12/27 Patient has been compliant with risperdal solution, no side effects or adverse reactions reported or observed, will plan to increase to 1.5mg  12/28 Accepting court ordered risperdal, remains paranoid, suspicious and devaluing of staff  12/29 Attending all groups, appropriately engaging in groups, gets along well with peers, taking medication  12/30 Very social with peers, going to groups, appropriately engaging, suspicious of food, but is eating  1/3 Very engaging and social with peers, continues to go to groups, interactive and appropriate with other patients, taking medications  1/4 Remains appropriate, fair, social, forgetful, taking meds  1/5 Risperdal increased to 2mg qd, pt to receive first loading dose of 234mg today

## 2023-01-05 NOTE — BH INPATIENT PSYCHIATRY PROGRESS NOTE - NSBHMETABOLIC_PSY_ALL_CORE_FT
BMI: BMI (kg/m2): 21.8 (11-30-22 @ 03:08)  HbA1c: A1C with Estimated Average Glucose Result: 5.4 % (12-01-22 @ 07:21)    Glucose:   BP: 145/73 (01-05-23 @ 08:40) (126/74 - 153/71)  Lipid Panel: Date/Time: 12-01-22 @ 07:21  Cholesterol, Serum: 168  Direct LDL: --  HDL Cholesterol, Serum: 89  Total Cholesterol/HDL Ration Measurement: --  Triglycerides, Serum: 29

## 2023-01-05 NOTE — BH INPATIENT PSYCHIATRY PROGRESS NOTE - NSBHCHARTREVIEWVS_PSY_A_CORE FT
Vital Signs Last 24 Hrs  T(C): 36.5 (01-05-23 @ 08:40), Max: 36.5 (01-05-23 @ 08:40)  T(F): 97.7 (01-05-23 @ 08:40), Max: 97.7 (01-05-23 @ 08:40)  HR: 67 (01-05-23 @ 08:40) (67 - 67)  BP: 145/73 (01-05-23 @ 08:40) (145/73 - 145/73)  BP(mean): --  RR: 18 (01-05-23 @ 08:40) (18 - 18)  SpO2: 98% (01-05-23 @ 08:40) (98% - 98%)

## 2023-01-06 LAB — SARS-COV-2 RNA SPEC QL NAA+PROBE: SIGNIFICANT CHANGE UP

## 2023-01-06 PROCEDURE — 99232 SBSQ HOSP IP/OBS MODERATE 35: CPT

## 2023-01-06 RX ADMIN — RISPERIDONE 2 MILLIGRAM(S): 4 TABLET ORAL at 14:31

## 2023-01-06 RX ADMIN — Medication 1 TABLET(S): at 14:31

## 2023-01-06 RX ADMIN — Medication 0.25 MILLIGRAM(S): at 14:31

## 2023-01-06 NOTE — BH INPATIENT PSYCHIATRY PROGRESS NOTE - NSBHMETABOLIC_PSY_ALL_CORE_FT
BMI: BMI (kg/m2): 21.8 (11-30-22 @ 03:08)  HbA1c: A1C with Estimated Average Glucose Result: 5.4 % (12-01-22 @ 07:21)    Glucose:   BP: 171/73 (01-05-23 @ 17:01) (126/74 - 171/73)  Lipid Panel: Date/Time: 12-01-22 @ 07:21  Cholesterol, Serum: 168  Direct LDL: --  HDL Cholesterol, Serum: 89  Total Cholesterol/HDL Ration Measurement: --  Triglycerides, Serum: 29

## 2023-01-06 NOTE — BH INPATIENT PSYCHIATRY PROGRESS NOTE - NSBHFUPINTERVALHXFT_PSY_A_CORE
Pt calm, cooperative, did not recall writer from prior substantial interaction, denied all sx though describes PI.  No Si/HI.  She received invega sustenna yesterday, no side effects yusra EPS; pt ambulating normally, in good spirits.  adherent with PO risperdal.

## 2023-01-06 NOTE — BH INPATIENT PSYCHIATRY PROGRESS NOTE - NSBHASSESSSUMMFT_PSY_ALL_CORE
This is a 76yo  female, , retired, domiciled alone in [place], [place]. No pertinent medical hx. Patient denies psychiatric hx, but documentation indicates a history of  paranoid delusions, with one prior admission at [facility] for a stay of 2 months. She has since been non-adherent to her medication; risperidone. No history of SI/plan/attempt, no history of substance/etoh abuse. She presents to ED c/o being stalked by neighbor and son-in-law in addition to complaints of intermittent chest pain x4 months. Patient was seen at [facility] yesterday for same complaint of chest pain and released form ED Of note, pt was missing for 3 days prior to being found at [facility]. Admitted to [address] [place] [address] status.    Patient noted to be delusional, MMSE of 22/30 significant for cognitive impairment.     Dementia work up: included head CT, HIV, Vitb12, RPR, TSH, Folate  Risperdal soluttion 2mg qd @1300  Ativan solution 0.25mg qd @1300    Results of Head CT:   ACC: [id number] EXAM:  CT BRAIN                          PROCEDURE DATE:  12/01/2022          INTERPRETATION:  PROCEDURE: CT head without intravenous contrast    INDICATION: Acute onset of psychosis    TECHNIQUE: Multiple axial images were obtained at 5 mm intervals from the   skull base to the vertex. Sagittal and coronal reformatted images were   obtained from the axial data set. The images were reviewed in brain and   bone windows.    COMPARISON: None  FINDINGS: The CT examination demonstrates is limited secondary to to   extensive streak artifact from multiple overlying metallic objects on the   patient's head. The ventricles, cisternal spaces, and cortical sulci   grossly appear within normal limits. There is no midline shift or extra   axial collections. The gray white differentiation appears within normal   limits. There is no intracranial hemorrhage or acute transcortical   infarct. There is extensive hypodensity within the periventricular white   matter which is nonspecific and may represent the sequela of small vessel   ischemic disease in this patient.    The bony windows demonstrates no fractures. The visualized paranasal   sinuses are within normal limits. The mastoid air cells are well aerated.    IMPRESSION: Limited exam secondary to extensive streak artifact. No   definite intracranial hemorrhage or acute transcortical infarct.   Extensive small vessel ischemic disease.      12/2/22: Patient is taking Risperdal 0.5mg qhs and multivitamin, however does not recall that she is taking medications. Endorsed having chest tightness/pain when discussing topic of her 'stalker', objectively does not appear to be in any distress. EKG ordered and reviewed, significant for 1st degree AV block. CTH also reviewd (see above) significant for 'small vessel ischemic disease.' No si/hi/avh or PI endorsed  12/5/22: Patient continues to refuse medications. Denies any psychiatric illness or cognitive decline, she states "there is nothing wrong with me." Denies SI/HI/AVH. Collateral obtained from daughter who shares concern for pt deteriorating cognition/memory  12/6/22- Patient's mood was very angry and irritable. She believes the staff here is "playing games" and drugging her food. She demanded to speak with her daughter as soon as possible because she feels unsafe in the unit. Refusing medications. No insight into illness and need for medications. Pt does not have capacity  12/7 Patient noted to have labile mood, is focused on select staff and a male peer stating that they are stalker her and coming into and out of her room. Observed eating lunch today. Continues to refuse all medications. No acute medical concerns. Dispo planning in effect. Per evening staff pt having regular outbursts at night, not currently requiring medications  12/8 Patient remains unchanged, irritable, labile, non-compliant with medication regimen. Forgetful. No acute medical issues, is eating meals  12/9 Patient visible on the unit, attending multiple groups, appropriately interacting with peers, eating, no meds  12/12 Patient going to multiple groups, pleasant with select peers and staff, continues to refuse all medications, remains paranoid and delusion. Will pursue TOO  12/13 Patient remains non compliant with medication, attending select groups, irritable edge, continues to verbalize feeling that a select male peer is stalking and harassing her. TOO in progress  12/14 Patient noted to be irritable and dismissive. She is focused on going to court today for discharge. Refusing all medications, paranoid and delusional  12/15 Patient did go to court yesterday for her release. Retention granted to the hospital. TOO in progress  12/16 Suspicious, guarded, refusing medications, visible on the unit, going to groups.  12/19 Irritable, confused, TOO in progress, visible on the unit  12/20 Refusing meds, very confused, suspicious and irritable  12/21 Going to groups, focused on discharged, forgetful, confused, pleasant with select peers, not taking meds  12/22 Irritable, suspicious, forgetful, received zyprexa 2.5mg IM last night  12/23 Took po risperdal last night with much encouragement, risperdal qhs changed to afternoon  12/27 Patient has been compliant with risperdal solution, no side effects or adverse reactions reported or observed, will plan to increase to 1.5mg  12/28 Accepting court ordered risperdal, remains paranoid, suspicious and devaluing of staff  12/29 Attending all groups, appropriately engaging in groups, gets along well with peers, taking medication  12/30 Very social with peers, going to groups, appropriately engaging, suspicious of food, but is eating  1/3 Very engaging and social with peers, continues to go to groups, interactive and appropriate with other patients, taking medications  1/4 Remains appropriate, fair, social, forgetful, taking meds  1/5 Risperdal increased to 2mg qd, pt to receive first loading dose of 234mg today  1/6 Tolerated sustanna yesterday, is scheduled for second dose next week

## 2023-01-06 NOTE — BH INPATIENT PSYCHIATRY PROGRESS NOTE - NSBHCHARTREVIEWVS_PSY_A_CORE FT
Vital Signs Last 24 Hrs  T(C): 36.7 (01-05-23 @ 17:01), Max: 36.7 (01-05-23 @ 17:01)  T(F): 98 (01-05-23 @ 17:01), Max: 98 (01-05-23 @ 17:01)  HR: 69 (01-05-23 @ 17:01) (69 - 69)  BP: 171/73 (01-05-23 @ 17:01) (171/73 - 171/73)  BP(mean): --  RR: 18 (01-05-23 @ 17:01) (18 - 18)  SpO2: 100% (01-05-23 @ 17:01) (100% - 100%)

## 2023-01-07 RX ADMIN — Medication 0.25 MILLIGRAM(S): at 13:18

## 2023-01-07 RX ADMIN — Medication 1 TABLET(S): at 13:19

## 2023-01-07 RX ADMIN — RISPERIDONE 2 MILLIGRAM(S): 4 TABLET ORAL at 13:19

## 2023-01-08 RX ADMIN — Medication 0.25 MILLIGRAM(S): at 14:02

## 2023-01-08 RX ADMIN — RISPERIDONE 2 MILLIGRAM(S): 4 TABLET ORAL at 14:03

## 2023-01-09 PROCEDURE — 99231 SBSQ HOSP IP/OBS SF/LOW 25: CPT

## 2023-01-09 RX ADMIN — Medication 0.25 MILLIGRAM(S): at 11:51

## 2023-01-09 RX ADMIN — RISPERIDONE 2 MILLIGRAM(S): 4 TABLET ORAL at 11:57

## 2023-01-09 RX ADMIN — Medication 1 TABLET(S): at 11:50

## 2023-01-09 NOTE — BH INPATIENT PSYCHIATRY PROGRESS NOTE - NSBHCHARTREVIEWVS_PSY_A_CORE FT
Vital Signs Last 24 Hrs  T(C): 36.7 (01-09-23 @ 09:00), Max: 36.7 (01-08-23 @ 17:17)  T(F): 98 (01-09-23 @ 09:00), Max: 98.1 (01-08-23 @ 17:17)  HR: 66 (01-09-23 @ 09:00) (58 - 66)  BP: 128/39 (01-09-23 @ 09:00) (128/39 - 128/54)  BP(mean): --  RR: 16 (01-09-23 @ 09:00) (16 - 16)  SpO2: 98% (01-09-23 @ 09:00) (96% - 98%)

## 2023-01-09 NOTE — BH INPATIENT PSYCHIATRY PROGRESS NOTE - NSBHFUPINTERVALHXFT_PSY_A_CORE
Patient visible on the unit, seen attending groups and appropriately interacting with peers and select staff. Unwilling to engage with writer today, on approach stating 'I've been waiting to speak with you' then walking off, terminating our interaction. Unwilling to further interact with writer. Is compliant with medications, eating meals. No acute medical concerns.

## 2023-01-09 NOTE — BH INPATIENT PSYCHIATRY PROGRESS NOTE - NSBHASSESSSUMMFT_PSY_ALL_CORE
This is a 76yo  female, , retired, domiciled alone in [place], [place]. No pertinent medical hx. Patient denies psychiatric hx, but documentation indicates a history of  paranoid delusions, with one prior admission at [facility] for a stay of 2 months. She has since been non-adherent to her medication; risperidone. No history of SI/plan/attempt, no history of substance/etoh abuse. She presents to ED c/o being stalked by neighbor and son-in-law in addition to complaints of intermittent chest pain x4 months. Patient was seen at [facility] yesterday for same complaint of chest pain and released form ED Of note, pt was missing for 3 days prior to being found at [facility]. Admitted to [address] [place] [address] status.    Patient noted to be delusional, MMSE of 22/30 significant for cognitive impairment.     Dementia work up: included head CT, HIV, Vitb12, RPR, TSH, Folate  Risperdal soluttion 2mg qd @1300  Ativan solution 0.25mg qd @1300    Results of Head CT:   ACC: [id number] EXAM:  CT BRAIN                          PROCEDURE DATE:  12/01/2022          INTERPRETATION:  PROCEDURE: CT head without intravenous contrast    INDICATION: Acute onset of psychosis    TECHNIQUE: Multiple axial images were obtained at 5 mm intervals from the   skull base to the vertex. Sagittal and coronal reformatted images were   obtained from the axial data set. The images were reviewed in brain and   bone windows.    COMPARISON: None  FINDINGS: The CT examination demonstrates is limited secondary to to   extensive streak artifact from multiple overlying metallic objects on the   patient's head. The ventricles, cisternal spaces, and cortical sulci   grossly appear within normal limits. There is no midline shift or extra   axial collections. The gray white differentiation appears within normal   limits. There is no intracranial hemorrhage or acute transcortical   infarct. There is extensive hypodensity within the periventricular white   matter which is nonspecific and may represent the sequela of small vessel   ischemic disease in this patient.    The bony windows demonstrates no fractures. The visualized paranasal   sinuses are within normal limits. The mastoid air cells are well aerated.    IMPRESSION: Limited exam secondary to extensive streak artifact. No   definite intracranial hemorrhage or acute transcortical infarct.   Extensive small vessel ischemic disease.      12/2/22: Patient is taking Risperdal 0.5mg qhs and multivitamin, however does not recall that she is taking medications. Endorsed having chest tightness/pain when discussing topic of her 'stalker', objectively does not appear to be in any distress. EKG ordered and reviewed, significant for 1st degree AV block. CTH also reviewd (see above) significant for 'small vessel ischemic disease.' No si/hi/avh or PI endorsed  12/5/22: Patient continues to refuse medications. Denies any psychiatric illness or cognitive decline, she states "there is nothing wrong with me." Denies SI/HI/AVH. Collateral obtained from daughter who shares concern for pt deteriorating cognition/memory  12/6/22- Patient's mood was very angry and irritable. She believes the staff here is "playing games" and drugging her food. She demanded to speak with her daughter as soon as possible because she feels unsafe in the unit. Refusing medications. No insight into illness and need for medications. Pt does not have capacity  12/7 Patient noted to have labile mood, is focused on select staff and a male peer stating that they are stalker her and coming into and out of her room. Observed eating lunch today. Continues to refuse all medications. No acute medical concerns. Dispo planning in effect. Per evening staff pt having regular outbursts at night, not currently requiring medications  12/8 Patient remains unchanged, irritable, labile, non-compliant with medication regimen. Forgetful. No acute medical issues, is eating meals  12/9 Patient visible on the unit, attending multiple groups, appropriately interacting with peers, eating, no meds  12/12 Patient going to multiple groups, pleasant with select peers and staff, continues to refuse all medications, remains paranoid and delusion. Will pursue TOO  12/13 Patient remains non compliant with medication, attending select groups, irritable edge, continues to verbalize feeling that a select male peer is stalking and harassing her. TOO in progress  12/14 Patient noted to be irritable and dismissive. She is focused on going to court today for discharge. Refusing all medications, paranoid and delusional  12/15 Patient did go to court yesterday for her release. Retention granted to the hospital. TOO in progress  12/16 Suspicious, guarded, refusing medications, visible on the unit, going to groups.  12/19 Irritable, confused, TOO in progress, visible on the unit  12/20 Refusing meds, very confused, suspicious and irritable  12/21 Going to groups, focused on discharged, forgetful, confused, pleasant with select peers, not taking meds  12/22 Irritable, suspicious, forgetful, received zyprexa 2.5mg IM last night  12/23 Took po risperdal last night with much encouragement, risperdal qhs changed to afternoon  12/27 Patient has been compliant with risperdal solution, no side effects or adverse reactions reported or observed, will plan to increase to 1.5mg  12/28 Accepting court ordered risperdal, remains paranoid, suspicious and devaluing of staff  12/29 Attending all groups, appropriately engaging in groups, gets along well with peers, taking medication  12/30 Very social with peers, going to groups, appropriately engaging, suspicious of food, but is eating  1/3 Very engaging and social with peers, continues to go to groups, interactive and appropriate with other patients, taking medications  1/4 Remains appropriate, fair, social, forgetful, taking meds  1/5 Risperdal increased to 2mg qd, pt to receive first loading dose of Invega Sustenna 234mg today  1/6 Tolerated sustanna yesterday, is scheduled for second dose next week  1/9 Visible and interactive on the unit, going to groups, eating meals, sleeping well, to receive second loading dose of Invega Sustenna 156mg tomorrow

## 2023-01-09 NOTE — BH INPATIENT PSYCHIATRY PROGRESS NOTE - CURRENT MEDICATION
MEDICATIONS  (STANDING):  LORazepam     Tablet 0.25 milliGRAM(s) Oral <User Schedule>  multivitamin 1 Tablet(s) Oral <User Schedule>  OLANZapine Injectable 2.5 milliGRAM(s) IntraMuscular <User Schedule>  risperiDONE   Solution 2 milliGRAM(s) Oral <User Schedule>    MEDICATIONS  (PRN):  acetaminophen     Tablet .. 650 milliGRAM(s) Oral every 6 hours PRN Moderate Pain (4 - 6)  aluminum hydroxide/magnesium hydroxide/simethicone Suspension 30 milliLiter(s) Oral every 4 hours PRN Dyspepsia  magnesium hydroxide Suspension 30 milliLiter(s) Oral daily PRN Constipation  OLANZapine 2.5 milliGRAM(s) Oral every 8 hours PRN agitation  traZODone 50 milliGRAM(s) Oral at bedtime PRN insomnia

## 2023-01-09 NOTE — BH INPATIENT PSYCHIATRY PROGRESS NOTE - NSBHMETABOLIC_PSY_ALL_CORE_FT
BMI: BMI (kg/m2): 21.8 (11-30-22 @ 03:08)  HbA1c: A1C with Estimated Average Glucose Result: 5.4 % (12-01-22 @ 07:21)    Glucose:   BP: 128/39 (01-09-23 @ 09:00) (125/58 - 157/73)  Lipid Panel: Date/Time: 12-01-22 @ 07:21  Cholesterol, Serum: 168  Direct LDL: --  HDL Cholesterol, Serum: 89  Total Cholesterol/HDL Ration Measurement: --  Triglycerides, Serum: 29

## 2023-01-10 PROCEDURE — 99231 SBSQ HOSP IP/OBS SF/LOW 25: CPT

## 2023-01-10 RX ADMIN — PALIPERIDONE 156 MILLIGRAM(S): 1.5 TABLET, EXTENDED RELEASE ORAL at 12:25

## 2023-01-10 RX ADMIN — RISPERIDONE 2 MILLIGRAM(S): 4 TABLET ORAL at 12:25

## 2023-01-10 RX ADMIN — Medication 1 TABLET(S): at 12:24

## 2023-01-10 RX ADMIN — Medication 0.25 MILLIGRAM(S): at 12:23

## 2023-01-10 NOTE — BH INPATIENT PSYCHIATRY PROGRESS NOTE - NSBHCHARTREVIEWVS_PSY_A_CORE FT
Vital Signs Last 24 Hrs  T(C): 36.4 (01-09-23 @ 17:09), Max: 36.4 (01-09-23 @ 17:09)  T(F): 97.5 (01-09-23 @ 17:09), Max: 97.5 (01-09-23 @ 17:09)  HR: 75 (01-09-23 @ 17:09) (75 - 75)  BP: 137/63 (01-09-23 @ 17:09) (137/63 - 137/63)  BP(mean): --  RR: 18 (01-09-23 @ 17:09) (18 - 18)  SpO2: 99% (01-09-23 @ 17:09) (99% - 99%)

## 2023-01-10 NOTE — BH INPATIENT PSYCHIATRY PROGRESS NOTE - NSBHMETABOLIC_PSY_ALL_CORE_FT
BMI: BMI (kg/m2): 21.8 (11-30-22 @ 03:08)  HbA1c: A1C with Estimated Average Glucose Result: 5.4 % (12-01-22 @ 07:21)    Glucose:   BP: 137/63 (01-09-23 @ 17:09) (128/39 - 154/77)  Lipid Panel: Date/Time: 12-01-22 @ 07:21  Cholesterol, Serum: 168  Direct LDL: --  HDL Cholesterol, Serum: 89  Total Cholesterol/HDL Ration Measurement: --  Triglycerides, Serum: 29

## 2023-01-10 NOTE — CHART NOTE - NSCHARTNOTESELECT_GEN_ALL_CORE
Event Note
Follow Up/Nutrition Services
Nutrition Services
Event Note
Follow up Nutrition Assessment/Event Note
Follow up Nutrition Assessment/Nutrition Services
Nutrition Services

## 2023-01-10 NOTE — BH INPATIENT PSYCHIATRY PROGRESS NOTE - NSBHFUPINTERVALHXFT_PSY_A_CORE
Patient visible on the unit, seen attending groups and appropriately interacting with peers and select staff. Superficial on approach, unwilling to interact with writer. Is compliant with medications, eating meals. No acute medical concerns. Is scheduled to receive second loading dose of Invega Sustenna

## 2023-01-10 NOTE — CHART NOTE - NSCHARTNOTEFT_GEN_A_CORE
Admitting Diagnosis:   Patient is a 77y old  Female who presents with a chief complaint of PARANOIA (03 Dec 2022 09:04)    PAST MEDICAL & SURGICAL HISTORY:  No pertinent past medical history  No significant past surgical history    Current Nutrition Order: Regular diet w/Ensure Enlive ONS Bid (350kcal, 20gPRO per serving)    PO Intake: Good (%) [ x ]  Fair (50-75%) [   ] Poor (<25%) [   ]    GI Issues:   WDL, no n/v/d/c  No abd distention or discomfort    Pain:   denies pain/discomfort per chart    Skin Integrity:   WDL    Labs:   No new labs    Medications:  MEDICATIONS  (STANDING):  LORazepam     Tablet 0.25 milliGRAM(s) Oral <User Schedule>  multivitamin 1 Tablet(s) Oral <User Schedule>  OLANZapine Injectable 2.5 milliGRAM(s) IntraMuscular <User Schedule>  paliperidone Injectable, Long Acting 156 milliGRAM(s) IntraMuscular <User Schedule>  risperiDONE   Solution 2 milliGRAM(s) Oral <User Schedule>    MEDICATIONS  (PRN):  acetaminophen     Tablet .. 650 milliGRAM(s) Oral every 6 hours PRN Moderate Pain (4 - 6)  aluminum hydroxide/magnesium hydroxide/simethicone Suspension 30 milliLiter(s) Oral every 4 hours PRN Dyspepsia  magnesium hydroxide Suspension 30 milliLiter(s) Oral daily PRN Constipation  OLANZapine 2.5 milliGRAM(s) Oral every 8 hours PRN agitation  traZODone 50 milliGRAM(s) Oral at bedtime PRN insomnia    Dosing Anthropometrics:  Height for BMI (FEET)	5 Feet  Height for BMI (INCHES)	2 Inch(s)  Height for BMI (CM)	157.48 Centimeter(s)  Weight for BMI (lbs)	119 lb  Weight for BMI (kg)	54 kg  Body Mass Index	              21.7    Weight Change:   new weight noted on 12/20/22 in EMR: 55.8kg (123#); 3# decrease in wt since 12/06/22  new weight noted on 12/06/22 in EMR: 57.2kg (126#); this wt increase of 7# is noteworthy--? accuracy of wt r/t 7# wt gain in 7 days    Nutrition Focused Physical Exam: please refer to initial nutrition assessment on 12/03/22    Estimated energy needs:   %IBW; 112%; ABW used to calculate estimated needs d/t pt being between 80 and 120% IBW. Adjusted for age  kcal: 20-25kcal/kg = 1116-1395kcal/day   protein: 1.0-1.2g/kg = 56-67g/day   fluids: 30-35mL/kg = 1674-1953mL/day     Subjective: 78yo  female, , retired, domiciled alone in [place], [place]. No pertinent medical hx. Patient denies psychiatric hx, but documentation indicates a history of  paranoid delusions, with one prior admission at [facility] for a stay of 2 months. Pt presented to ED with c/o chest painx4 months, and being stalked by neighbor and son-in-law. Patient was seen at [facility] [place], operated by [facility] yesterday for same complaint of chest pain and released. Of note, pt was missing for 3 days prior to being found at [facility]. Admitted to [facility] 2pc status. Pt seen on [facility]. Currently on regular diet with Ensure Enlive BID (700 kcal, 40g protein, 360 mL free H2O) tolerating well consuming >75% of est needs. No n/v/d/c. No abd distention. RD cont to encourage adequate PO intake as tolerated. RD to follow.     Nutrition Dx: N/A    Goal:   Pt to consistently meet at least 75% of EER via tolerated route that is consistent with GOC during hospital stay;     Recommendations:  1. Continue with Regular diet and Ensure Enlive ONS.  >>Encourage PO intake as appropriate   2. Monitor lytes, replete prn. Monitor BG.   3. Trend weekly wts. Monitor skin integrity, s/sx GI distress.   4. Pain/BM regimen per team   5. RD diet edu reinforcement prn.     Education: RD encouraged pt to continue to maintain good PO intakes as able. RD to follow up per protocol.    Risk Level: High [   ] Moderate [  ] Low [ x  ].

## 2023-01-10 NOTE — BH INPATIENT PSYCHIATRY PROGRESS NOTE - CURRENT MEDICATION
MEDICATIONS  (STANDING):  LORazepam     Tablet 0.25 milliGRAM(s) Oral <User Schedule>  multivitamin 1 Tablet(s) Oral <User Schedule>  OLANZapine Injectable 2.5 milliGRAM(s) IntraMuscular <User Schedule>  paliperidone Injectable, Long Acting 156 milliGRAM(s) IntraMuscular <User Schedule>  risperiDONE   Solution 2 milliGRAM(s) Oral <User Schedule>    MEDICATIONS  (PRN):  acetaminophen     Tablet .. 650 milliGRAM(s) Oral every 6 hours PRN Moderate Pain (4 - 6)  aluminum hydroxide/magnesium hydroxide/simethicone Suspension 30 milliLiter(s) Oral every 4 hours PRN Dyspepsia  magnesium hydroxide Suspension 30 milliLiter(s) Oral daily PRN Constipation  OLANZapine 2.5 milliGRAM(s) Oral every 8 hours PRN agitation  traZODone 50 milliGRAM(s) Oral at bedtime PRN insomnia

## 2023-01-10 NOTE — BH INPATIENT PSYCHIATRY PROGRESS NOTE - NSBHASSESSSUMMFT_PSY_ALL_CORE
This is a 78yo  female, , retired, domiciled alone in [place], [place]. No pertinent medical hx. Patient denies psychiatric hx, but documentation indicates a history of  paranoid delusions, with one prior admission at [facility] for a stay of 2 months. She has since been non-adherent to her medication; risperidone. No history of SI/plan/attempt, no history of substance/etoh abuse. She presents to ED c/o being stalked by neighbor and son-in-law in addition to complaints of intermittent chest pain x4 months. Patient was seen at [facility] yesterday for same complaint of chest pain and released form ED Of note, pt was missing for 3 days prior to being found at [facility]. Admitted to [address] [place] [address] status.    Patient noted to be delusional, MMSE of 22/30 significant for cognitive impairment.     Dementia work up: included head CT, HIV, Vitb12, RPR, TSH, Folate  Risperdal soluttion 2mg qd @1300  Ativan solution 0.25mg qd @1300    Results of Head CT:   ACC: [id number] EXAM:  CT BRAIN                          PROCEDURE DATE:  12/01/2022          INTERPRETATION:  PROCEDURE: CT head without intravenous contrast    INDICATION: Acute onset of psychosis    TECHNIQUE: Multiple axial images were obtained at 5 mm intervals from the   skull base to the vertex. Sagittal and coronal reformatted images were   obtained from the axial data set. The images were reviewed in brain and   bone windows.    COMPARISON: None  FINDINGS: The CT examination demonstrates is limited secondary to to   extensive streak artifact from multiple overlying metallic objects on the   patient's head. The ventricles, cisternal spaces, and cortical sulci   grossly appear within normal limits. There is no midline shift or extra   axial collections. The gray white differentiation appears within normal   limits. There is no intracranial hemorrhage or acute transcortical   infarct. There is extensive hypodensity within the periventricular white   matter which is nonspecific and may represent the sequela of small vessel   ischemic disease in this patient.    The bony windows demonstrates no fractures. The visualized paranasal   sinuses are within normal limits. The mastoid air cells are well aerated.    IMPRESSION: Limited exam secondary to extensive streak artifact. No   definite intracranial hemorrhage or acute transcortical infarct.   Extensive small vessel ischemic disease.      12/2/22: Patient is taking Risperdal 0.5mg qhs and multivitamin, however does not recall that she is taking medications. Endorsed having chest tightness/pain when discussing topic of her 'stalker', objectively does not appear to be in any distress. EKG ordered and reviewed, significant for 1st degree AV block. CTH also reviewd (see above) significant for 'small vessel ischemic disease.' No si/hi/avh or PI endorsed  12/5/22: Patient continues to refuse medications. Denies any psychiatric illness or cognitive decline, she states "there is nothing wrong with me." Denies SI/HI/AVH. Collateral obtained from daughter who shares concern for pt deteriorating cognition/memory  12/6/22- Patient's mood was very angry and irritable. She believes the staff here is "playing games" and drugging her food. She demanded to speak with her daughter as soon as possible because she feels unsafe in the unit. Refusing medications. No insight into illness and need for medications. Pt does not have capacity  12/7 Patient noted to have labile mood, is focused on select staff and a male peer stating that they are stalker her and coming into and out of her room. Observed eating lunch today. Continues to refuse all medications. No acute medical concerns. Dispo planning in effect. Per evening staff pt having regular outbursts at night, not currently requiring medications  12/8 Patient remains unchanged, irritable, labile, non-compliant with medication regimen. Forgetful. No acute medical issues, is eating meals  12/9 Patient visible on the unit, attending multiple groups, appropriately interacting with peers, eating, no meds  12/12 Patient going to multiple groups, pleasant with select peers and staff, continues to refuse all medications, remains paranoid and delusion. Will pursue TOO  12/13 Patient remains non compliant with medication, attending select groups, irritable edge, continues to verbalize feeling that a select male peer is stalking and harassing her. TOO in progress  12/14 Patient noted to be irritable and dismissive. She is focused on going to court today for discharge. Refusing all medications, paranoid and delusional  12/15 Patient did go to court yesterday for her release. Retention granted to the hospital. TOO in progress  12/16 Suspicious, guarded, refusing medications, visible on the unit, going to groups.  12/19 Irritable, confused, TOO in progress, visible on the unit  12/20 Refusing meds, very confused, suspicious and irritable  12/21 Going to groups, focused on discharged, forgetful, confused, pleasant with select peers, not taking meds  12/22 Irritable, suspicious, forgetful, received zyprexa 2.5mg IM last night  12/23 Took po risperdal last night with much encouragement, risperdal qhs changed to afternoon  12/27 Patient has been compliant with risperdal solution, no side effects or adverse reactions reported or observed, will plan to increase to 1.5mg  12/28 Accepting court ordered risperdal, remains paranoid, suspicious and devaluing of staff  12/29 Attending all groups, appropriately engaging in groups, gets along well with peers, taking medication  12/30 Very social with peers, going to groups, appropriately engaging, suspicious of food, but is eating  1/3 Very engaging and social with peers, continues to go to groups, interactive and appropriate with other patients, taking medications  1/4 Remains appropriate, fair, social, forgetful, taking meds  1/5 Risperdal increased to 2mg qd, pt to receive first loading dose of Invega Sustenna 234mg today  1/6 Tolerated sustanna yesterday, is scheduled for second dose next week  1/9 Visible and interactive on the unit, going to groups, eating meals, sleeping well, to receive second loading dose of Invega Sustenna 156mg tomorrow  1/10 Noted to be calmer, is interactive on the unit, take medications, sleeping and eating well. Will receive second loading dose of Invega sustenna today

## 2023-01-10 NOTE — CHART NOTE - NSCHARTNOTEFT_GEN_A_CORE
Admitting Diagnosis:   Patient is a 77y old  Female who presents with a chief complaint of PARANOIA     (03 Dec 2022 09:04)      PAST MEDICAL & SURGICAL HISTORY:  No pertinent past medical history      No significant past surgical history          Current Nutrition Order:       PO Intake: Good (%) [   ]  Fair (50-75%) [   ] Poor (<25%) [   ]    GI Issues:     Pain:    Skin Integrity:    Labs:         CAPILLARY BLOOD GLUCOSE          Medications:  MEDICATIONS  (STANDING):  LORazepam     Tablet 0.25 milliGRAM(s) Oral <User Schedule>  multivitamin 1 Tablet(s) Oral <User Schedule>  OLANZapine Injectable 2.5 milliGRAM(s) IntraMuscular <User Schedule>  paliperidone Injectable, Long Acting 156 milliGRAM(s) IntraMuscular <User Schedule>  risperiDONE   Solution 2 milliGRAM(s) Oral <User Schedule>    MEDICATIONS  (PRN):  acetaminophen     Tablet .. 650 milliGRAM(s) Oral every 6 hours PRN Moderate Pain (4 - 6)  aluminum hydroxide/magnesium hydroxide/simethicone Suspension 30 milliLiter(s) Oral every 4 hours PRN Dyspepsia  magnesium hydroxide Suspension 30 milliLiter(s) Oral daily PRN Constipation  OLANZapine 2.5 milliGRAM(s) Oral every 8 hours PRN agitation  traZODone 50 milliGRAM(s) Oral at bedtime PRN insomnia    Weight:  Daily     Daily     Weight Change:     Nutrition Focused Physical Exam: Completed [   ]  Not Pertinent [   ]  Muscle Wasting- Temporal [   ]  Clavicle/Pectoral [   ]  Shoulder/Deltoid [   ]  Scapula [   ]  Interosseous [   ]  Quadriceps [   ]  Gastrocnemius [   ]  Fat Wasting- Orbital [   ]  Buccal [   ]  Triceps [   ]  Rib [   ]  Suspect [PCM] 2/2 to physical assessment, [poor intake], and [wt loss]; please see malnutrition chart note.    Estimated energy needs:     Subjective:     Previous Nutrition Diagnosis:    Active [   ]  Resolved [   ]    If resolved, new PES:     Goal:    Recommendations:    Education:     Risk Level: High [   ] Moderate [   ] Low [   ]

## 2023-01-11 PROCEDURE — 99231 SBSQ HOSP IP/OBS SF/LOW 25: CPT

## 2023-01-11 RX ADMIN — RISPERIDONE 2 MILLIGRAM(S): 4 TABLET ORAL at 14:39

## 2023-01-11 RX ADMIN — Medication 0.25 MILLIGRAM(S): at 14:39

## 2023-01-11 RX ADMIN — Medication 1 TABLET(S): at 14:39

## 2023-01-11 NOTE — BH INPATIENT PSYCHIATRY DISCHARGE NOTE - REASON FOR ADMISSION
Patient presents to ED c/o being stalked by neighbor and son-in-law in addition to complaints of intermittent chest pain x4 months. 76 YO F with no past medical hx presents with dementia and psychosis. Patient presents to ED c/o being stalked by neighbor and son-in-law in addition to complaints of intermittent chest pain x4 months.

## 2023-01-11 NOTE — BH INPATIENT PSYCHIATRY PROGRESS NOTE - NSBHFUPINTERVALHXFT_PSY_A_CORE
Is visible on the unit, seen seated in a chair by the nursing station, appears to be comfortable and relaxed with no complaints. She states that she is doing well  Patient received Invega Sustenna loading dose #2 yesterday without issue. No medical concerns, no psychiatric complaints, is eating well and sleeping well without issue. Going to groups and engaging with people.

## 2023-01-11 NOTE — BH INPATIENT PSYCHIATRY DISCHARGE NOTE - HOSPITAL COURSE
Patient initially refusing medication (Risperdal).  EKG ordered and reviewed, significant for 1st degree AV block. CTH also reviewd (see above) significant for 'small vessel ischemic disease.' Later in course patient visible on the unit, attending multiple groups, appropriately interacting with peers.  On 12/14 patient was seen in court  for her release. Retention granted to the hospital. received zyprexa 2.5mg IM last night  Began taking court-ordered Risperdal on 12/23, increased to 2mg qd, pt to receive first loading dose of Invega Sustenna 234mg on 1/5. Will receive second loading dose of Invega sustenna 1/10.   Per SW, pt has not been accepted to any nursing homes, appears that she will have to return to family   Patient initially refusing medication (Risperdal), irritable, and disorganized. On 12/14 patient was seen in court  for her release: retention granted to the hospital. Began taking court-ordered Risperdal on 12/23, increased to 2mg qd, pt to receive first loading dose of Invega Sustenna 234mg on 1/5. Received second loading dose of Invega sustenna 1/10. EKG ordered and reviewed, significant for 1st degree AV block. CTH also reviewd (see above) significant for 'small vessel ischemic disease.' Later in course patient visible on the unit, attending multiple groups, appropriately interacting with peers. Per SW, pt has not been accepted to any nursing homes, appears that she will have to return to family. Significant improvements in psychotic symptoms. No concerns for SI/HI throughout course. No significant behavioral episodes.

## 2023-01-11 NOTE — BH INPATIENT PSYCHIATRY DISCHARGE NOTE - NSDCPROCEDURESFT_PSY_ALL_CORE
ACC: [id number] EXAM:  CT BRAIN                          PROCEDURE DATE:  12/01/2022          INTERPRETATION:  PROCEDURE: CT head without intravenous contrast    INDICATION: Acute onset of psychosis    TECHNIQUE: Multiple axial images were obtained at 5 mm intervals from the   skull base to the vertex. Sagittal and coronal reformatted images were   obtained from the axial data set. The images were reviewed in brain and   bone windows.    COMPARISON: None    FINDINGS: The CT examination demonstrates is limited secondary to to   extensive streak artifact from multiple overlying metallic objects on the   patient's head. The ventricles, cisternal spaces, and cortical sulci   grossly appear within normal limits. There is no midline shift or extra   axial collections. The gray white differentiation appears within normal   limits. There is no intracranial hemorrhage or acute transcortical   infarct. There is extensive hypodensity within the periventricular white   matter which is nonspecific and may represent the sequela of small vessel   ischemic disease in this patient.    The bony windows demonstrates no fractures. The visualized paranasal   sinuses are within normal limits. The mastoid air cells are well aerated.    IMPRESSION: Limited exam secondary to extensive streak artifact. No   definite intracranial hemorrhage or acute transcortical infarct.   Extensive small vessel ischemic disease.    --- End of Report ---    ACC: [id number] EXAM:  XR CHEST PA LAT 2V                          PROCEDURE DATE:  11/30/2022          INTERPRETATION:  Chest 2 views    HISTORY: Chest pain    IMPRESSION:    Lungs clear. No infiltrate pleural effusion or pneumothorax. Vascular   calcification thoracic aorta. Normal size heart. No acute bone   abnormality.    --- End of Report ---

## 2023-01-11 NOTE — BH INPATIENT PSYCHIATRY DISCHARGE NOTE - NSDCADMDATE_GEN_ALL_CORE_FT
Problem: Patient Care Overview  Goal: Plan of Care Review  Outcome: Ongoing (interventions implemented as appropriate)   10/09/19 0375   Coping/Psychosocial   Plan of Care Reviewed With patient   Plan of Care Review   Progress improving   OTHER   Outcome Summary VSS, pain medication given, trach care completed, Ng tube has been flushed frequently due to thick output. Will cont to monitor.       Problem: Fall Risk (Adult)  Goal: Absence of Fall  Outcome: Ongoing (interventions implemented as appropriate)      Problem: Skin Injury Risk (Adult)  Goal: Skin Health and Integrity  Outcome: Ongoing (interventions implemented as appropriate)      Problem: Pain, Chronic (Adult)  Goal: Acceptable Pain/Comfort Level and Functional Ability  Outcome: Ongoing (interventions implemented as appropriate)      Problem: Wound (Includes Pressure Injury) (Adult)  Goal: Signs and Symptoms of Listed Potential Problems Will be Absent, Minimized or Managed (Wound)  Outcome: Ongoing (interventions implemented as appropriate)      Problem: Nutrition, Parenteral (Adult)  Goal: Signs and Symptoms of Listed Potential Problems Will be Absent, Minimized or Managed (Nutrition, Parenteral)  Outcome: Ongoing (interventions implemented as appropriate)    Goal: Signs and Symptoms of Listed Potential Problems Will be Absent, Minimized or Managed (Nutrition, Parenteral)  Outcome: Ongoing (interventions implemented as appropriate)         30-Nov-2022 17:50
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
## 2023-01-11 NOTE — BH INPATIENT PSYCHIATRY DISCHARGE NOTE - NSDCCPCAREPLAN_GEN_ALL_CORE_FT
PRINCIPAL DISCHARGE DIAGNOSIS  Diagnosis: Dementia with psychosis  Assessment and Plan of Treatment: Continue current medication regimen and follow up with aftercare appointment

## 2023-01-11 NOTE — BH INPATIENT PSYCHIATRY DISCHARGE NOTE - ATTENDING DISCHARGE PHYSICAL EXAMINATION:
MSE-Well groomed and related, good EC. -PMR/A Speech: wnl Mood: "OK" Affect: full-range, appropriate TP: linear, illogical at times TC: +PI (but less intense about it), -SI/HI/AH/VH. I&J: poor. Pt often doesn't remember conversations from earlier in the day or even providers that she has been working with for some time.

## 2023-01-11 NOTE — BH INPATIENT PSYCHIATRY DISCHARGE NOTE - DESCRIPTION
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 She has been staying with her daughter for the past 2 months after being d/c from [facility] psychiatric unit. Her  passed away ~4-5 years ago but sustains herself with financial compensation after his death. She has 2 daughters, oldest one lives in [facility]. She believes that her oldest daughter is obsessed with her youngest daughter, since she can't have children and the youngest one does. She has 8 other siblings, 2 sisters have refused to take her in for care.

## 2023-01-11 NOTE — BH INPATIENT PSYCHIATRY PROGRESS NOTE - NSBHCHARTREVIEWVS_PSY_A_CORE FT
Vital Signs Last 24 Hrs  T(C): 36.6 (01-10-23 @ 16:56), Max: 36.6 (01-10-23 @ 16:56)  T(F): 97.9 (01-10-23 @ 16:56), Max: 97.9 (01-10-23 @ 16:56)  HR: 73 (01-10-23 @ 16:56) (73 - 73)  BP: 122/55 (01-10-23 @ 16:56) (122/55 - 122/55)  BP(mean): --  RR: 18 (01-10-23 @ 16:56) (18 - 18)  SpO2: 99% (01-10-23 @ 16:56) (99% - 99%)

## 2023-01-11 NOTE — BH INPATIENT PSYCHIATRY DISCHARGE NOTE - NSBHDCBILLING_PSY_ALL_CORE
07273 (Hospital discharge day management; 30 min or less) 26101 (Hospital discharge day management; more than 30 min)

## 2023-01-11 NOTE — BH INPATIENT PSYCHIATRY DISCHARGE NOTE - NSBHDCRISKMITIGATEFT_PSY_ALL_CORE
Patient received first Invega Sustenna loading dose of 234mg on 1/5/23 and second loading dose of 156mg of 1/10/23. Is next due of 2/8/23.

## 2023-01-11 NOTE — BH INPATIENT PSYCHIATRY PROGRESS NOTE - NSBHMETABOLIC_PSY_ALL_CORE_FT
BMI: BMI (kg/m2): 21.8 (11-30-22 @ 03:08)  HbA1c: A1C with Estimated Average Glucose Result: 5.4 % (12-01-22 @ 07:21)    Glucose:   BP: 122/55 (01-10-23 @ 16:56) (122/55 - 137/63)  Lipid Panel: Date/Time: 12-01-22 @ 07:21  Cholesterol, Serum: 168  Direct LDL: --  HDL Cholesterol, Serum: 89  Total Cholesterol/HDL Ration Measurement: --  Triglycerides, Serum: 29
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
## 2023-01-12 LAB — SARS-COV-2 RNA SPEC QL NAA+PROBE: SIGNIFICANT CHANGE UP

## 2023-01-12 PROCEDURE — 99231 SBSQ HOSP IP/OBS SF/LOW 25: CPT

## 2023-01-12 RX ADMIN — Medication 0.25 MILLIGRAM(S): at 13:51

## 2023-01-12 RX ADMIN — Medication 1 TABLET(S): at 13:52

## 2023-01-12 NOTE — BH INPATIENT PSYCHIATRY PROGRESS NOTE - NSBHASSESSSUMMFT_PSY_ALL_CORE
This is a 76yo  female, , retired, domiciled alone in [place], [place]. No pertinent medical hx. Patient denies psychiatric hx, but documentation indicates a history of  paranoid delusions, with one prior admission at [facility] for a stay of 2 months. She has since been non-adherent to her medication; risperidone. No history of SI/plan/attempt, no history of substance/etoh abuse. She presents to ED c/o being stalked by neighbor and son-in-law in addition to complaints of intermittent chest pain x4 months. Patient was seen at [facility] yesterday for same complaint of chest pain and released form ED Of note, pt was missing for 3 days prior to being found at [facility], operated by [facility]. Admitted to [address] [place] [address] status.    Patient noted to be delusional, MMSE of 22/30 significant for cognitive impairment.     Dementia work up: included head CT, HIV, Vitb12, RPR, TSH, Folate  Risperdal soluttion 2mg qd @1300  Ativan solution 0.25mg qd @1300    Results of Head CT:   ACC: [id number] EXAM:  CT BRAIN                          PROCEDURE DATE:  12/01/2022          INTERPRETATION:  PROCEDURE: CT head without intravenous contrast    INDICATION: Acute onset of psychosis    TECHNIQUE: Multiple axial images were obtained at 5 mm intervals from the   skull base to the vertex. Sagittal and coronal reformatted images were   obtained from the axial data set. The images were reviewed in brain and   bone windows.    COMPARISON: None  FINDINGS: The CT examination demonstrates is limited secondary to to   extensive streak artifact from multiple overlying metallic objects on the   patient's head. The ventricles, cisternal spaces, and cortical sulci   grossly appear within normal limits. There is no midline shift or extra   axial collections. The gray white differentiation appears within normal   limits. There is no intracranial hemorrhage or acute transcortical   infarct. There is extensive hypodensity within the periventricular white   matter which is nonspecific and may represent the sequela of small vessel   ischemic disease in this patient.    The bony windows demonstrates no fractures. The visualized paranasal   sinuses are within normal limits. The mastoid air cells are well aerated.    IMPRESSION: Limited exam secondary to extensive streak artifact. No   definite intracranial hemorrhage or acute transcortical infarct.   Extensive small vessel ischemic disease.      12/2/22: Patient is taking Risperdal 0.5mg qhs and multivitamin, however does not recall that she is taking medications. Endorsed having chest tightness/pain when discussing topic of her 'stalker', objectively does not appear to be in any distress. EKG ordered and reviewed, significant for 1st degree AV block. CTH also reviewd (see above) significant for 'small vessel ischemic disease.' No si/hi/avh or PI endorsed  12/5/22: Patient continues to refuse medications. Denies any psychiatric illness or cognitive decline, she states "there is nothing wrong with me." Denies SI/HI/AVH. Collateral obtained from daughter who shares concern for pt deteriorating cognition/memory  12/6/22- Patient's mood was very angry and irritable. She believes the staff here is "playing games" and drugging her food. She demanded to speak with her daughter as soon as possible because she feels unsafe in the unit. Refusing medications. No insight into illness and need for medications. Pt does not have capacity  12/7 Patient noted to have labile mood, is focused on select staff and a male peer stating that they are stalker her and coming into and out of her room. Observed eating lunch today. Continues to refuse all medications. No acute medical concerns. Dispo planning in effect. Per evening staff pt having regular outbursts at night, not currently requiring medications  12/8 Patient remains unchanged, irritable, labile, non-compliant with medication regimen. Forgetful. No acute medical issues, is eating meals  12/9 Patient visible on the unit, attending multiple groups, appropriately interacting with peers, eating, no meds  12/12 Patient going to multiple groups, pleasant with select peers and staff, continues to refuse all medications, remains paranoid and delusion. Will pursue TOO  12/13 Patient remains non compliant with medication, attending select groups, irritable edge, continues to verbalize feeling that a select male peer is stalking and harassing her. TOO in progress  12/14 Patient noted to be irritable and dismissive. She is focused on going to court today for discharge. Refusing all medications, paranoid and delusional  12/15 Patient did go to court yesterday for her release. Retention granted to the hospital. TOO in progress  12/16 Suspicious, guarded, refusing medications, visible on the unit, going to groups.  12/19 Irritable, confused, TOO in progress, visible on the unit  12/20 Refusing meds, very confused, suspicious and irritable  12/21 Going to groups, focused on discharged, forgetful, confused, pleasant with select peers, not taking meds  12/22 Irritable, suspicious, forgetful, received zyprexa 2.5mg IM last night  12/23 Took po risperdal last night with much encouragement, risperdal qhs changed to afternoon  12/27 Patient has been compliant with risperdal solution, no side effects or adverse reactions reported or observed, will plan to increase to 1.5mg  12/28 Accepting court ordered risperdal, remains paranoid, suspicious and devaluing of staff  12/29 Attending all groups, appropriately engaging in groups, gets along well with peers, taking medication  12/30 Very social with peers, going to groups, appropriately engaging, suspicious of food, but is eating  1/3 Very engaging and social with peers, continues to go to groups, interactive and appropriate with other patients, taking medications  1/4 Remains appropriate, fair, social, forgetful, taking meds  1/5 Risperdal increased to 2mg qd, pt to receive first loading dose of Invega Sustenna 234mg today  1/6 Tolerated sustanna yesterday, is scheduled for second dose next week  1/9 Visible and interactive on the unit, going to groups, eating meals, sleeping well, to receive second loading dose of Invega Sustenna 156mg tomorrow  1/10 Noted to be calmer, is interactive on the unit, take medications, sleeping and eating well. Will receive second loading dose of Invega sustenna today  1/11 Visible on the unit, calmer, eating well, going to groups, taking medications. Future oriented, no medical concerns. Per SW, pt has not been accepted to any nursing homes, appears that she will have to return to family  1/12 Overall remains unchanged.

## 2023-01-12 NOTE — BH TREATMENT PLAN - NSTXDCHOUSINTERRN_PSY_ALL_CORE
patient is encouragedto join groups ,verbalize feelings to staff and comply with meds.
Have provider discuss with pt's family options for post-discharge care. Reiterate current medical situation with patient.
Have provider discuss with pt's family options for post-discharge care. Reiterate current medical situation with patient.

## 2023-01-12 NOTE — BH TREATMENT PLAN - NSTXPSYCHOGOAL_PSY_ALL_CORE
Other...
Will report using relaxation skills 3 times a day to reduce anxiety about delusions/hallucinations
Other...
Will identify 1 trigger/stressor that exacerbates hallucinations
Other...
Other...
Will identify 2 coping skills that help mitigate hallucinations
Will identify 2 coping skills that help mitigate hallucinations

## 2023-01-12 NOTE — BH TREATMENT PLAN - NSBHPRIMARYDX_PSY_ALL_CORE
Dementia with psychosis    
Psychosis    
Dementia with psychosis    

## 2023-01-12 NOTE — BH TREATMENT PLAN - NSTXDISORGGOAL_PSY_ALL_CORE
Will identify 2 coping skills that assist in organizing
Will make at least 3 goal and reality oriented statements during therapy
Will make at least 3 goal and reality oriented statements during therapy
Other...
Will make at least 3 goal and reality oriented statements during therapy
Will identify 2 coping skills that assist in organizing

## 2023-01-12 NOTE — BH INPATIENT PSYCHIATRY PROGRESS NOTE - NSBHCHARTREVIEWVS_PSY_A_CORE FT
Vital Signs Last 24 Hrs  T(C): 36.4 (01-12-23 @ 08:45), Max: 36.7 (01-11-23 @ 17:10)  T(F): 97.6 (01-12-23 @ 08:45), Max: 98 (01-11-23 @ 17:10)  HR: 67 (01-12-23 @ 08:45) (67 - 72)  BP: 146/67 (01-12-23 @ 08:45) (146/67 - 147/79)  BP(mean): --  RR: 16 (01-12-23 @ 08:45) (16 - 18)  SpO2: 97% (01-12-23 @ 08:45) (97% - 98%)

## 2023-01-12 NOTE — BH TREATMENT PLAN - NSTXPROBDCHOUS_PSY_ALL_CORE
DISCHARGE ISSUE - LACK OF APPROPRIATE HOUSING

## 2023-01-12 NOTE — BH TREATMENT PLAN - NSTXPLANSTARTDATE_PSY_ALL_CORE
13-Dec-2022 17:06
06-Dec-2022 09:32
21-Dec-2022 15:48
01-Dec-2022 14:19
21-Dec-2022 15:48
21-Dec-2022 15:48
13-Dec-2022 17:06
21-Dec-2022 15:48

## 2023-01-12 NOTE — BH TREATMENT PLAN - NSTXPSYCHOINTERRN_PSY_ALL_CORE
Encourage medication compliance. Assess improvement of symptoms post-medication administration. Provide reality reorientation measures. Remind of court-ordered treatment at time of medication refusal.
Encourage patient to adhere with medications and treatment. Encourage patient to attend groups and verbalize her feelings and concerns. Help patient identify coping strategies that have worked in the past and support the use of these strategies.
Encourage patient to adhere with medications and treatment. Encourage patient to attend groups and verbalize his feelings and concerns. Help patient identify coping strategies that have worked in the past and support the use of these strategies.
Encourage patient to adhere with medications and treatment. Encourage patient to attend groups and verbalize her feelings and concerns. Help patient identify coping strategies that have worked in the past and support the use of these strategies.

## 2023-01-12 NOTE — BH TREATMENT PLAN - NSPTSTATEDGOAL_PSY_ALL_CORE
'get out of here'
'to leave'
'get out of here'
"I do not have a emotional disturbance and I would like to go home."
'get out of here'

## 2023-01-12 NOTE — BH TREATMENT PLAN - NSTXDISORGINTERRN_PSY_ALL_CORE
Encourage patient to adhere with medications and treatment. Encourage patient to attend groups and verbalize her feelings and concerns. Help patient identify coping strategies that have worked in the past and support the use of these strategies.
Encourage patient to adhere with medications and treatment. Encourage patient to attend groups and verbalize his feelings and concerns. Help patient identify coping strategies that have worked in the past and support the use of these strategies.
Encourage patient to adhere with medications and treatment. Encourage patient to attend groups and verbalize her feelings and concerns. Help patient identify coping strategies that have worked in the past and support the use of these strategies.
Encourage patient to comply with her medications and treatment plan.

## 2023-01-12 NOTE — BH TREATMENT PLAN - NSTXPATIENTPARTICIPATE_PSY_ALL_CORE
No, patient unwilling to participate
No, patient unwilling to participate
Patient participated in development of after care plan
No, patient unwilling to participate

## 2023-01-12 NOTE — BH TREATMENT PLAN - NSDCCRITERIA_PSY_ALL_CORE
Decrease in delusions, stabilization of mood

## 2023-01-12 NOTE — BH TREATMENT PLAN - NSTXPLANTHERAPYSESSIONSFT_PSY_ALL_CORE
12-07-22  Type of therapy: Creative arts therapy  Type of session: Group  Level of patient participation: Participates  Duration of participation: 15 minutes  Therapy conducted by: Psych rehab  Therapy Summary: Pt came into group towards the ends but was able to participate in group dialogue in response to what peers shared. Pt was calm and appropriate with peers, and made some insightful statements about group content. Pt continues to display some forgetfulness.  
  12-21-22  Type of therapy: Creative arts therapy  Type of session: Group  Level of patient participation: Engaged,Participates  Duration of participation: 45 minutes  Therapy conducted by: Psych rehab  Therapy Summary: Pt. continues to join groups of most modalities; pt. is attentive and more organized than previous weeks. Pt. is able to share stories from childhood but still struggles with short-term memory, what time groups begin, etc.  
  12-07-22  Type of therapy: Creative arts therapy  Type of session: Group  Level of patient participation: Participates  Duration of participation: 15 minutes  Therapy conducted by: Psych rehab  Therapy Summary: Pt came into group towards the ends but was able to participate in group dialogue in response to what peers shared. Pt was calm and appropriate with peers, and made some insightful statements about group content. Pt continues to display some forgetfulness.  
  12-21-22  Type of therapy: Creative arts therapy  Type of session: Group  Level of patient participation: Engaged,Participates  Duration of participation: 45 minutes  Therapy conducted by: Psych rehab  Therapy Summary: Pt. continues to join groups of most modalities; pt. is attentive and more organized than previous weeks. Pt. is able to share stories from childhood but still struggles with short-term memory, what time groups begin, etc.  

## 2023-01-12 NOTE — BH INPATIENT PSYCHIATRY PROGRESS NOTE - NSBHMETABOLIC_PSY_ALL_CORE_FT
BMI: BMI (kg/m2): 21.8 (11-30-22 @ 03:08)  HbA1c: A1C with Estimated Average Glucose Result: 5.4 % (12-01-22 @ 07:21)    Glucose:   BP: 146/67 (01-12-23 @ 08:45) (122/55 - 147/79)  Lipid Panel: Date/Time: 12-01-22 @ 07:21  Cholesterol, Serum: 168  Direct LDL: --  HDL Cholesterol, Serum: 89  Total Cholesterol/HDL Ration Measurement: --  Triglycerides, Serum: 29

## 2023-01-12 NOTE — BH INPATIENT PSYCHIATRY PROGRESS NOTE - NSBHFUPINTERVALHXFT_PSY_A_CORE
Patient is seen in room today, resting on approach, denies having any complaints; medical or psychiatric. Compliant with medications, fair appetite and sleep. Patient's daughter was contacted by treatment team yesterday and informed that pt has been declined by >100 nursing homes etc and may subsequently need to return home with additional resources.

## 2023-01-12 NOTE — BH TREATMENT PLAN - NSTXPATIENTAGREEMENT_PSY_ALL_CORE
[Time Spent: ___ minutes] : I have spent [unfilled] minutes of time on the encounter.
Patient unable to participate
Yes
No

## 2023-01-12 NOTE — BH TREATMENT PLAN - NSTXCAREGIVERPARTICIPATE_PSY_P_CORE
Family/Caregiver participated in identification of needs/problems/goals for treatment/Family/Caregiver participated in defining interventions/Family/Caregiver participated in development of after care plan

## 2023-01-12 NOTE — BH TREATMENT PLAN - NSTXDISORGINTERMD_PSY_ALL_CORE
Psychopharmacology x15 minutes, Risperdal titration continued, TOO and giving Invega Sustenna, ativan for mood stability.

## 2023-01-12 NOTE — BH TREATMENT PLAN - NSTXDISORGINTERPR_PSY_ALL_CORE
PT will be invited and encouraged to attend all offered groups
PT will be invited and encouraged to attend all offered groups
Pt will continue to be invited and encouraged to attend all offered groups

## 2023-01-12 NOTE — BH TREATMENT PLAN - NSTXOTHERINTERRN_PSY_ALL_CORE
Encourage patient to adhere with medications and treatment. Encourage patient to attend groups and verbalize his feelings and concerns. Help patient identify coping strategies that have worked in the past and support the use of these strategies.
Provide reality orientation.
Provide reality reorientation measures. Mention date and time when conversing with patient.
Encourage patient to adhere with medications and treatment. Encourage patient to attend groups and verbalize her feelings and concerns. Help patient identify coping strategies that have worked in the past and support the use of these strategies.
Provide reality reorientation measures. Mention date and time when conversing with patient.
Provide reality orientation.

## 2023-01-12 NOTE — BH TREATMENT PLAN - NSTXDCHOUSINTERSW_PSY_ALL_CORE
1- Liaise with family   2- Discuss aftercare options with patient

## 2023-01-12 NOTE — BH TREATMENT PLAN - NSTXDCHOUSGOAL_PSY_ALL_CORE
Other...
Will meet with care coordinator and accept services
Other...
Will meet with care coordinator and accept services
Other...
Will meet with care coordinator and accept services
Other...

## 2023-01-13 PROCEDURE — 99231 SBSQ HOSP IP/OBS SF/LOW 25: CPT

## 2023-01-13 RX ADMIN — Medication 0.25 MILLIGRAM(S): at 13:46

## 2023-01-13 RX ADMIN — RISPERIDONE 2 MILLIGRAM(S): 4 TABLET ORAL at 13:53

## 2023-01-13 RX ADMIN — Medication 1 TABLET(S): at 13:46

## 2023-01-13 NOTE — BH INPATIENT PSYCHIATRY PROGRESS NOTE - NSBHCHARTREVIEWVS_PSY_A_CORE FT
Vital Signs Last 24 Hrs  T(C): 36.7 (01-13-23 @ 08:45), Max: 36.7 (01-12-23 @ 16:41)  T(F): 98 (01-13-23 @ 08:45), Max: 98 (01-12-23 @ 16:41)  HR: 73 (01-13-23 @ 08:45) (73 - 73)  BP: 95/58 (01-13-23 @ 08:45) (94/55 - 95/58)  BP(mean): --  RR: 18 (01-13-23 @ 08:45) (16 - 18)  SpO2: 98% (01-13-23 @ 08:45) (98% - 98%)

## 2023-01-13 NOTE — BH INPATIENT PSYCHIATRY PROGRESS NOTE - NSBHFUPINTERVALHXFT_PSY_A_CORE
Patient visible on the unit, seen interacting with peers and reading the newspaper. Has no complaints. Fair appetite and sleep. No reported si/hi/avh, no paranoid ideations endorsed. No medical concerns, is taking meds without issue.

## 2023-01-13 NOTE — BH INPATIENT PSYCHIATRY PROGRESS NOTE - NSBHMETABOLIC_PSY_ALL_CORE_FT
BMI: BMI (kg/m2): 21.8 (11-30-22 @ 03:08)  HbA1c: A1C with Estimated Average Glucose Result: 5.4 % (12-01-22 @ 07:21)    Glucose:   BP: 95/58 (01-13-23 @ 08:45) (94/55 - 147/79)  Lipid Panel: Date/Time: 12-01-22 @ 07:21  Cholesterol, Serum: 168  Direct LDL: --  HDL Cholesterol, Serum: 89  Total Cholesterol/HDL Ration Measurement: --  Triglycerides, Serum: 29

## 2023-01-13 NOTE — BH INPATIENT PSYCHIATRY PROGRESS NOTE - NSBHASSESSSUMMFT_PSY_ALL_CORE
This is a 76yo  female, , retired, domiciled alone in [place], [place]. No pertinent medical hx. Patient denies psychiatric hx, but documentation indicates a history of  paranoid delusions, with one prior admission at [facility] for a stay of 2 months. She has since been non-adherent to her medication; risperidone. No history of SI/plan/attempt, no history of substance/etoh abuse. She presents to ED c/o being stalked by neighbor and son-in-law in addition to complaints of intermittent chest pain x4 months. Patient was seen at [facility] yesterday for same complaint of chest pain and released form ED Of note, pt was missing for 3 days prior to being found at [facility]. Admitted to [address] [place] [address] status.    Patient noted to be delusional, MMSE of 22/30 significant for cognitive impairment.     Dementia work up: included head CT, HIV, Vitb12, RPR, TSH, Folate  Risperdal soluttion 2mg qd @1300  Ativan solution 0.25mg qd @1300    Results of Head CT:   ACC: [id number] EXAM:  CT BRAIN                          PROCEDURE DATE:  12/01/2022          INTERPRETATION:  PROCEDURE: CT head without intravenous contrast    INDICATION: Acute onset of psychosis    TECHNIQUE: Multiple axial images were obtained at 5 mm intervals from the   skull base to the vertex. Sagittal and coronal reformatted images were   obtained from the axial data set. The images were reviewed in brain and   bone windows.    COMPARISON: None  FINDINGS: The CT examination demonstrates is limited secondary to to   extensive streak artifact from multiple overlying metallic objects on the   patient's head. The ventricles, cisternal spaces, and cortical sulci   grossly appear within normal limits. There is no midline shift or extra   axial collections. The gray white differentiation appears within normal   limits. There is no intracranial hemorrhage or acute transcortical   infarct. There is extensive hypodensity within the periventricular white   matter which is nonspecific and may represent the sequela of small vessel   ischemic disease in this patient.    The bony windows demonstrates no fractures. The visualized paranasal   sinuses are within normal limits. The mastoid air cells are well aerated.    IMPRESSION: Limited exam secondary to extensive streak artifact. No   definite intracranial hemorrhage or acute transcortical infarct.   Extensive small vessel ischemic disease.      12/2/22: Patient is taking Risperdal 0.5mg qhs and multivitamin, however does not recall that she is taking medications. Endorsed having chest tightness/pain when discussing topic of her 'stalker', objectively does not appear to be in any distress. EKG ordered and reviewed, significant for 1st degree AV block. CTH also reviewd (see above) significant for 'small vessel ischemic disease.' No si/hi/avh or PI endorsed  12/5/22: Patient continues to refuse medications. Denies any psychiatric illness or cognitive decline, she states "there is nothing wrong with me." Denies SI/HI/AVH. Collateral obtained from daughter who shares concern for pt deteriorating cognition/memory  12/6/22- Patient's mood was very angry and irritable. She believes the staff here is "playing games" and drugging her food. She demanded to speak with her daughter as soon as possible because she feels unsafe in the unit. Refusing medications. No insight into illness and need for medications. Pt does not have capacity  12/7 Patient noted to have labile mood, is focused on select staff and a male peer stating that they are stalker her and coming into and out of her room. Observed eating lunch today. Continues to refuse all medications. No acute medical concerns. Dispo planning in effect. Per evening staff pt having regular outbursts at night, not currently requiring medications  12/8 Patient remains unchanged, irritable, labile, non-compliant with medication regimen. Forgetful. No acute medical issues, is eating meals  12/9 Patient visible on the unit, attending multiple groups, appropriately interacting with peers, eating, no meds  12/12 Patient going to multiple groups, pleasant with select peers and staff, continues to refuse all medications, remains paranoid and delusion. Will pursue TOO  12/13 Patient remains non compliant with medication, attending select groups, irritable edge, continues to verbalize feeling that a select male peer is stalking and harassing her. TOO in progress  12/14 Patient noted to be irritable and dismissive. She is focused on going to court today for discharge. Refusing all medications, paranoid and delusional  12/15 Patient did go to court yesterday for her release. Retention granted to the hospital. TOO in progress  12/16 Suspicious, guarded, refusing medications, visible on the unit, going to groups.  12/19 Irritable, confused, TOO in progress, visible on the unit  12/20 Refusing meds, very confused, suspicious and irritable  12/21 Going to groups, focused on discharged, forgetful, confused, pleasant with select peers, not taking meds  12/22 Irritable, suspicious, forgetful, received zyprexa 2.5mg IM last night  12/23 Took po risperdal last night with much encouragement, risperdal qhs changed to afternoon  12/27 Patient has been compliant with risperdal solution, no side effects or adverse reactions reported or observed, will plan to increase to 1.5mg  12/28 Accepting court ordered risperdal, remains paranoid, suspicious and devaluing of staff  12/29 Attending all groups, appropriately engaging in groups, gets along well with peers, taking medication  12/30 Very social with peers, going to groups, appropriately engaging, suspicious of food, but is eating  1/3 Very engaging and social with peers, continues to go to groups, interactive and appropriate with other patients, taking medications  1/4 Remains appropriate, fair, social, forgetful, taking meds  1/5 Risperdal increased to 2mg qd, pt to receive first loading dose of Invega Sustenna 234mg today  1/6 Tolerated sustanna yesterday, is scheduled for second dose next week  1/9 Visible and interactive on the unit, going to groups, eating meals, sleeping well, to receive second loading dose of Invega Sustenna 156mg tomorrow  1/10 Noted to be calmer, is interactive on the unit, take medications, sleeping and eating well. Will receive second loading dose of Invega sustenna today  1/11 Visible on the unit, calmer, eating well, going to groups, taking medications. Future oriented, no medical concerns. Per SW, pt has not been accepted to any nursing homes, appears that she will have to return to family  1/12 Overall remains unchanged.  1/13 Sleeping, eating well, taking meds, discharge focused, no paranoid ideations endorsed

## 2023-01-14 RX ADMIN — Medication 1 TABLET(S): at 13:00

## 2023-01-14 RX ADMIN — RISPERIDONE 2 MILLIGRAM(S): 4 TABLET ORAL at 13:04

## 2023-01-14 RX ADMIN — Medication 0.25 MILLIGRAM(S): at 12:59

## 2023-01-15 RX ADMIN — Medication 1 TABLET(S): at 12:26

## 2023-01-15 RX ADMIN — Medication 0.25 MILLIGRAM(S): at 12:25

## 2023-01-15 RX ADMIN — RISPERIDONE 2 MILLIGRAM(S): 4 TABLET ORAL at 12:48

## 2023-01-16 RX ADMIN — RISPERIDONE 2 MILLIGRAM(S): 4 TABLET ORAL at 13:13

## 2023-01-16 RX ADMIN — Medication 0.25 MILLIGRAM(S): at 12:56

## 2023-01-16 RX ADMIN — Medication 1 TABLET(S): at 12:57

## 2023-01-17 VITALS
TEMPERATURE: 98 F | HEART RATE: 76 BPM | DIASTOLIC BLOOD PRESSURE: 63 MMHG | OXYGEN SATURATION: 99 % | RESPIRATION RATE: 18 BRPM | SYSTOLIC BLOOD PRESSURE: 121 MMHG

## 2023-01-17 PROCEDURE — 99239 HOSP IP/OBS DSCHRG MGMT >30: CPT

## 2023-01-17 RX ADMIN — Medication 1 TABLET(S): at 12:40

## 2023-01-17 RX ADMIN — RISPERIDONE 2 MILLIGRAM(S): 4 TABLET ORAL at 12:41

## 2023-01-17 RX ADMIN — Medication 0.25 MILLIGRAM(S): at 12:41

## 2023-01-17 NOTE — BH INPATIENT PSYCHIATRY PROGRESS NOTE - NSTXPSYCHODATEEST_PSY_ALL_CORE
28-Dec-2022
11-Dec-2022
28-Dec-2022
11-Dec-2022
21-Dec-2022
21-Dec-2022
11-Dec-2022
01-Dec-2022
01-Dec-2022
28-Dec-2022
11-Dec-2022
28-Dec-2022
28-Dec-2022
11-Dec-2022
11-Dec-2022
21-Dec-2022
21-Dec-2022
01-Dec-2022
28-Dec-2022
01-Dec-2022
01-Dec-2022
11-Dec-2022
28-Dec-2022
28-Dec-2022
21-Dec-2022
21-Dec-2022
28-Dec-2022
28-Dec-2022
11-Dec-2022
11-Dec-2022

## 2023-01-17 NOTE — BH INPATIENT PSYCHIATRY PROGRESS NOTE - NSBHFUPINTERVALHXFT_PSY_A_CORE
Patient visible on the unit, seen interacting with peers. Has no complaints. Reports good appetite and sleep. No reported si/hi/avh, no paranoid ideations endorsed. No medical concerns, is taking meds without issue. Patient today discussed plans upon discharge to return to her  daughter's residence. She is weighing options of selling her house in [place] due to concerns for stalking.

## 2023-01-17 NOTE — BH DISCHARGE NOTE NURSING/SOCIAL WORK/PSYCH REHAB - PATIENT PORTAL LINK FT
You can access the FollowMyHealth Patient Portal offered by [facility] by registering at the following website: [email or web address]. By joining [employer]’s FollowMyHealth portal, you will also be able to view your health information using other applications (apps) compatible with our system.

## 2023-01-17 NOTE — BH INPATIENT PSYCHIATRY PROGRESS NOTE - NSBHCONSULTIPREASON_PSY_A_CORE
danger to self; mental illness expected to respond to inpatient care

## 2023-01-17 NOTE — BH INPATIENT PSYCHIATRY PROGRESS NOTE - NSTXPROBOTHER_PSY_ALL_CORE
OTHER PROBLEM

## 2023-01-17 NOTE — BH INPATIENT PSYCHIATRY PROGRESS NOTE - NSTXOTHERDATENEW_PSY_ALL_CORE
07-Jan-2023
05-Jan-2023
07-Jan-2023
07-Jan-2023
05-Jan-2023
07-Jan-2023
07-Jan-2023

## 2023-01-17 NOTE — BH INPATIENT PSYCHIATRY PROGRESS NOTE - NSTXDCHOUSDATENEW_PSY_ALL_CORE
05-Jan-2023
24-Jan-2023
05-Jan-2023

## 2023-01-17 NOTE — BH INPATIENT PSYCHIATRY PROGRESS NOTE - NSTXDISORGINTERMD_PSY_ALL_CORE
Psychopharmacology x15 minutes, Risperdal initiated, will titrate as appropriate 
Psychopharmacology x15 minutes, Risperdal initiated, will titrate as appropriate 
Psychopharmacology x15 minutes, Risperdal initiated, will titrate as appropriate, TOO with the goal of MALLOY will be completed. 
Psychopharmacology x15 minutes, Risperdal initiated, will titrate as appropriate, TOO may be considered it pt continues to refuse
Psychopharmacology x15 minutes, Risperdal titration continued, TOO and giving Invega Sustenna, ativan for mood stability. 
Psychopharmacology x15 minutes, Risperdal initiated, will titrate as appropriate 
Psychopharmacology x15 minutes, Risperdal initiated, will titrate as appropriate, TOO may be considered it pt continues to refuse
Psychopharmacology x15 minutes, Risperdal titration continued, TOO with the goal of MALLOY will be completed. 
Psychopharmacology x15 minutes, Risperdal titration continued, TOO with the goal of MALLOY will be completed. 
Psychopharmacology x15 minutes, Risperdal initiated, will titrate as appropriate, TOO may be considered it pt continues to refuse
Psychopharmacology x15 minutes, Risperdal initiated, will titrate as appropriate 
Psychopharmacology x15 minutes, Risperdal initiated, will titrate as appropriate, TOO may be considered it pt continues to refuse
Psychopharmacology x15 minutes, Risperdal titration continued, TOO with the goal of MALLOY will be completed. 
Psychopharmacology x15 minutes, Risperdal titration continued, TOO and giving Invega Sustenna, ativan for mood stability. 
Psychopharmacology x15 minutes, Risperdal titration continued, TOO and giving Invega Sustenna, ativan for mood stability. 
Psychopharmacology x15 minutes, Risperdal titration continued, TOO with the goal of MALLOY will be completed. 
Psychopharmacology x15 minutes, Risperdal initiated, will titrate as appropriate, TOO with the goal of MALLOY will be completed. 
Psychopharmacology x15 minutes, Risperdal initiated, will titrate as appropriate, TOO may be considered it pt continues to refuse
Psychopharmacology x15 minutes, Risperdal initiated, will titrate as appropriate once patient starts to take it, TOO with the goal of MALLOY will be completed. 
Psychopharmacology x15 minutes, Risperdal initiated, will titrate as appropriate, TOO with the goal of MALLOY will be completed. 
Psychopharmacology x15 minutes, Risperdal titration continued, TOO and giving Invega Sustenna, ativan for mood stability. 
Psychopharmacology x15 minutes, Risperdal titration continued, TOO with the goal of MALLOY will be completed. 
Psychopharmacology x15 minutes, Risperdal initiated, will titrate as appropriate once patient starts to take it, TOO with the goal of MALLOY will be completed. 
Psychopharmacology x15 minutes, Risperdal initiated, will titrate as appropriate, TOO may be considered it pt continues to refuse
Psychopharmacology x15 minutes, Risperdal initiated, will titrate as appropriate, TOO may be considered it pt continues to refuse
Psychopharmacology x15 minutes, Risperdal initiated, will titrate as appropriate, TOO with the goal of MALLOY will be completed. 
Psychopharmacology x15 minutes, Risperdal titration continued, TOO and giving Invega Sustenna, ativan for mood stability. 
Psychopharmacology x15 minutes, Risperdal initiated, will titrate as appropriate, TOO may be considered it pt continues to refuse
Psychopharmacology x15 minutes, Risperdal initiated, will titrate as appropriate once patient starts to take it, TOO with the goal of MALLOY will be completed. 
Psychopharmacology x15 minutes, Risperdal initiated, will titrate as appropriate once patient starts to take it, TOO with the goal of MALLOY will be completed. 
Psychopharmacology x15 minutes, Risperdal titration continued, TOO and giving Invega Sustenna, ativan for mood stability.

## 2023-01-17 NOTE — BH INPATIENT PSYCHIATRY PROGRESS NOTE - NSBHMSEGROOM_PSY_A_CORE
Fair
Poor
Fair
Poor
Fair

## 2023-01-17 NOTE — BH INPATIENT PSYCHIATRY PROGRESS NOTE - NSTXPSYCHOGOAL_PSY_ALL_CORE
Will identify 1 trigger/stressor that exacerbates hallucinations
Other...
Other...
Will report using relaxation skills 3 times a day to reduce anxiety about delusions/hallucinations
Will identify 2 coping skills that help mitigate hallucinations
Other...
Will identify 1 trigger/stressor that exacerbates hallucinations
Other...
Other...
Will identify 2 coping skills that help mitigate hallucinations
Will identify 1 trigger/stressor that exacerbates hallucinations
Will report using relaxation skills 3 times a day to reduce anxiety about delusions/hallucinations
Other...
Will identify 2 coping skills that help mitigate hallucinations
Other...
Will identify 1 trigger/stressor that exacerbates hallucinations
Will report using relaxation skills 3 times a day to reduce anxiety about delusions/hallucinations
Will identify 2 coping skills that help mitigate hallucinations
Will report using relaxation skills 3 times a day to reduce anxiety about delusions/hallucinations
Other...
Will identify 1 trigger/stressor that exacerbates hallucinations
Will identify 2 coping skills that help mitigate hallucinations
Other...
Will identify 2 coping skills that help mitigate hallucinations
Other...
Will identify 2 coping skills that help mitigate hallucinations
Will identify 2 coping skills that help mitigate hallucinations
Will identify 1 trigger/stressor that exacerbates hallucinations

## 2023-01-17 NOTE — BH INPATIENT PSYCHIATRY PROGRESS NOTE - PRN MEDS
MEDICATIONS  (PRN):  acetaminophen     Tablet .. 650 milliGRAM(s) Oral every 6 hours PRN Moderate Pain (4 - 6)  aluminum hydroxide/magnesium hydroxide/simethicone Suspension 30 milliLiter(s) Oral every 4 hours PRN Dyspepsia  magnesium hydroxide Suspension 30 milliLiter(s) Oral daily PRN Constipation  OLANZapine 2.5 milliGRAM(s) Oral every 8 hours PRN agitation  traZODone 50 milliGRAM(s) Oral at bedtime PRN insomnia  

## 2023-01-17 NOTE — BH INPATIENT PSYCHIATRY PROGRESS NOTE - NSBHATTESTBILLONSITE_PSY_A_CORE
JEREMY to bill

## 2023-01-17 NOTE — BH INPATIENT PSYCHIATRY PROGRESS NOTE - LEVEL OF CONSCIOUSNESS
Alert

## 2023-01-17 NOTE — BH INPATIENT PSYCHIATRY PROGRESS NOTE - NSDCCRITERIA_PSY_ALL_CORE
Decrease in delusions, stabilization of mood

## 2023-01-17 NOTE — BH INPATIENT PSYCHIATRY PROGRESS NOTE - NSBHASSESSSUMMFT_PSY_ALL_CORE
This is a 76yo  female, , retired, domiciled alone in [place], [place]. No pertinent medical hx. Patient denies psychiatric hx, but documentation indicates a history of  paranoid delusions, with one prior admission at [facility] for a stay of 2 months. She has since been non-adherent to her medication; risperidone. No history of SI/plan/attempt, no history of substance/etoh abuse. She presents to ED c/o being stalked by neighbor and son-in-law in addition to complaints of intermittent chest pain x4 months. Patient was seen at [facility] yesterday for same complaint of chest pain and released form ED Of note, pt was missing for 3 days prior to being found at [facility]. Admitted to [address] [place] [address] status.    Patient noted to be delusional, MMSE of 22/30 significant for cognitive impairment.     Dementia work up: included head CT, HIV, Vitb12, RPR, TSH, Folate  Risperdal soluttion 2mg qd @1300  Ativan solution 0.25mg qd @1300    Results of Head CT:   ACC: [id number] EXAM:  CT BRAIN                          PROCEDURE DATE:  12/01/2022          INTERPRETATION:  PROCEDURE: CT head without intravenous contrast    INDICATION: Acute onset of psychosis    TECHNIQUE: Multiple axial images were obtained at 5 mm intervals from the   skull base to the vertex. Sagittal and coronal reformatted images were   obtained from the axial data set. The images were reviewed in brain and   bone windows.    COMPARISON: None  FINDINGS: The CT examination demonstrates is limited secondary to to   extensive streak artifact from multiple overlying metallic objects on the   patient's head. The ventricles, cisternal spaces, and cortical sulci   grossly appear within normal limits. There is no midline shift or extra   axial collections. The gray white differentiation appears within normal   limits. There is no intracranial hemorrhage or acute transcortical   infarct. There is extensive hypodensity within the periventricular white   matter which is nonspecific and may represent the sequela of small vessel   ischemic disease in this patient.    The bony windows demonstrates no fractures. The visualized paranasal   sinuses are within normal limits. The mastoid air cells are well aerated.    IMPRESSION: Limited exam secondary to extensive streak artifact. No   definite intracranial hemorrhage or acute transcortical infarct.   Extensive small vessel ischemic disease.      12/2/22: Patient is taking Risperdal 0.5mg qhs and multivitamin, however does not recall that she is taking medications. Endorsed having chest tightness/pain when discussing topic of her 'stalker', objectively does not appear to be in any distress. EKG ordered and reviewed, significant for 1st degree AV block. CTH also reviewd (see above) significant for 'small vessel ischemic disease.' No si/hi/avh or PI endorsed  12/5/22: Patient continues to refuse medications. Denies any psychiatric illness or cognitive decline, she states "there is nothing wrong with me." Denies SI/HI/AVH. Collateral obtained from daughter who shares concern for pt deteriorating cognition/memory  12/6/22- Patient's mood was very angry and irritable. She believes the staff here is "playing games" and drugging her food. She demanded to speak with her daughter as soon as possible because she feels unsafe in the unit. Refusing medications. No insight into illness and need for medications. Pt does not have capacity  12/7 Patient noted to have labile mood, is focused on select staff and a male peer stating that they are stalker her and coming into and out of her room. Observed eating lunch today. Continues to refuse all medications. No acute medical concerns. Dispo planning in effect. Per evening staff pt having regular outbursts at night, not currently requiring medications  12/8 Patient remains unchanged, irritable, labile, non-compliant with medication regimen. Forgetful. No acute medical issues, is eating meals  12/9 Patient visible on the unit, attending multiple groups, appropriately interacting with peers, eating, no meds  12/12 Patient going to multiple groups, pleasant with select peers and staff, continues to refuse all medications, remains paranoid and delusion. Will pursue TOO  12/13 Patient remains non compliant with medication, attending select groups, irritable edge, continues to verbalize feeling that a select male peer is stalking and harassing her. TOO in progress  12/14 Patient noted to be irritable and dismissive. She is focused on going to court today for discharge. Refusing all medications, paranoid and delusional  12/15 Patient did go to court yesterday for her release. Retention granted to the hospital. TOO in progress  12/16 Suspicious, guarded, refusing medications, visible on the unit, going to groups.  12/19 Irritable, confused, TOO in progress, visible on the unit  12/20 Refusing meds, very confused, suspicious and irritable  12/21 Going to groups, focused on discharged, forgetful, confused, pleasant with select peers, not taking meds  12/22 Irritable, suspicious, forgetful, received zyprexa 2.5mg IM last night  12/23 Took po risperdal last night with much encouragement, risperdal qhs changed to afternoon  12/27 Patient has been compliant with risperdal solution, no side effects or adverse reactions reported or observed, will plan to increase to 1.5mg  12/28 Accepting court ordered risperdal, remains paranoid, suspicious and devaluing of staff  12/29 Attending all groups, appropriately engaging in groups, gets along well with peers, taking medication  12/30 Very social with peers, going to groups, appropriately engaging, suspicious of food, but is eating  1/3 Very engaging and social with peers, continues to go to groups, interactive and appropriate with other patients, taking medications  1/4 Remains appropriate, fair, social, forgetful, taking meds  1/5 Risperdal increased to 2mg qd, pt to receive first loading dose of Invega Sustenna 234mg today  1/6 Tolerated sustanna yesterday, is scheduled for second dose next week  1/9 Visible and interactive on the unit, going to groups, eating meals, sleeping well, to receive second loading dose of Invega Sustenna 156mg tomorrow  1/10 Noted to be calmer, is interactive on the unit, take medications, sleeping and eating well. Will receive second loading dose of Invega sustenna today  1/11 Visible on the unit, calmer, eating well, going to groups, taking medications. Future oriented, no medical concerns. Per SW, pt has not been accepted to any nursing homes, appears that she will have to return to family  1/12 Overall remains unchanged.  1/13 Sleeping, eating well, taking meds, discharge focused, no paranoid ideations endorsed

## 2023-01-17 NOTE — BH INPATIENT PSYCHIATRY PROGRESS NOTE - NSBHCONTPROVIDER_PSY_ALL_CORE
Not applicable

## 2023-01-17 NOTE — BH INPATIENT PSYCHIATRY PROGRESS NOTE - NSBHMSEREMMEM_PSY_A_CORE
Normal
Impaired
Normal
Normal
Impaired
Normal
Impaired
Normal
Normal
Impaired
Normal
Impaired
Normal
Normal
Impaired
Impaired

## 2023-01-17 NOTE — BH INPATIENT PSYCHIATRY PROGRESS NOTE - NSTXDCHOUSDATEEST_PSY_ALL_CORE
08-Dec-2022
08-Jan-2023
08-Dec-2022
08-Dec-2022
08-Jan-2023
08-Jan-2023
08-Dec-2022
08-Jan-2023
08-Dec-2022
08-Jan-2023
08-Dec-2022
08-Dec-2022
08-Jan-2023
08-Dec-2022

## 2023-01-17 NOTE — BH INPATIENT PSYCHIATRY PROGRESS NOTE - NSBHMSEINSIGHT_PSY_A_CORE
Poor

## 2023-01-17 NOTE — BH INPATIENT PSYCHIATRY PROGRESS NOTE - NSBHMSEHYG_PSY_A_CORE
Fair

## 2023-01-17 NOTE — BH INPATIENT PSYCHIATRY PROGRESS NOTE - NSBHMSEINTELL_PSY_A_CORE
Average

## 2023-01-17 NOTE — BH INPATIENT PSYCHIATRY PROGRESS NOTE - NSTXPROBDCHOUS_PSY_ALL_CORE
DISCHARGE ISSUE - LACK OF APPROPRIATE HOUSING

## 2023-01-17 NOTE — BH INPATIENT PSYCHIATRY PROGRESS NOTE - NSBHMSERECMEM_PSY_A_CORE
Impaired

## 2023-01-17 NOTE — BH INPATIENT PSYCHIATRY PROGRESS NOTE - MSE OPTIONS
Structured MSE

## 2023-01-17 NOTE — BH INPATIENT PSYCHIATRY PROGRESS NOTE - NSBHMSEKNOW_PSY_A_CORE
Normal
Normal
Impaired
Normal
Normal
Impaired
Normal
Normal
Impaired
Normal
Impaired
Normal
Impaired
Normal

## 2023-01-17 NOTE — BH INPATIENT PSYCHIATRY PROGRESS NOTE - NSICDXBHSECONDARYDX_PSY_ALL_CORE
Delusional disorder   F22  

## 2023-01-17 NOTE — BH INPATIENT PSYCHIATRY PROGRESS NOTE - NS ED BHA REVIEW OF ED CHART VITAL SIGNS REVIEWED
Yes

## 2023-01-17 NOTE — BH DISCHARGE NOTE NURSING/SOCIAL WORK/PSYCH REHAB - NSTOBACCOOTHER_PSY_ALL_CORE_FT
Call [facility] Smokers' Quitline at [phone] ([phone]) or visit [email or web address]

## 2023-01-17 NOTE — BH INPATIENT PSYCHIATRY PROGRESS NOTE - NSTXDISORGDATEEST_PSY_ALL_CORE
01-Dec-2022
07-Dec-2022
07-Dec-2022
21-Dec-2022
04-Jan-2023
04-Jan-2023
07-Dec-2022
21-Dec-2022
07-Dec-2022
28-Dec-2022
07-Dec-2022
01-Dec-2022
01-Dec-2022
11-Jan-2023
07-Dec-2022
11-Jan-2023
01-Dec-2022
07-Dec-2022
21-Dec-2022
04-Jan-2023
21-Dec-2022
07-Dec-2022
28-Dec-2022
01-Dec-2022
01-Dec-2022
04-Jan-2023
04-Jan-2023
07-Dec-2022
28-Dec-2022
28-Dec-2022
21-Dec-2022
11-Jan-2023
11-Jan-2023

## 2023-01-17 NOTE — BH INPATIENT PSYCHIATRY PROGRESS NOTE - NSBHCONSDANGERSELF_PSY_A_CORE
unable to care for self

## 2023-01-17 NOTE — BH INPATIENT PSYCHIATRY PROGRESS NOTE - NSBHPSYCHOLCOGORIENT_PSY_A_CORE
Oriented to time, place, person, situation

## 2023-01-17 NOTE — BH INPATIENT PSYCHIATRY PROGRESS NOTE - NSTXPSYCHOPROGRES_PSY_ALL_CORE
Improving
No Change
Improving
No Change
Improving
No Change
Improving
No Change
Improving
No Change
Improving
No Change
No Change
Improving
No Change
Improving
No Change

## 2023-01-17 NOTE — BH INPATIENT PSYCHIATRY PROGRESS NOTE - NSTXPSYCHODATENEW_PSY_ALL_CORE
11-Jan-2023
24-Jan-2023
04-Jan-2023
11-Jan-2023
04-Jan-2023
11-Jan-2023
04-Jan-2023
04-Jan-2023

## 2023-01-17 NOTE — BH INPATIENT PSYCHIATRY PROGRESS NOTE - NSBHMSEJUDGE_PSY_A_CORE
Poor
Fair
Poor

## 2023-01-17 NOTE — BH DISCHARGE NOTE NURSING/SOCIAL WORK/PSYCH REHAB - NSDCPRGOAL_PSY_ALL_CORE
Pt has attended groups regularly over this hospitalization. Pt continues to be forgetful and need reminders about the group schedule and unit structure, but shows better orientation to the unit and less confusion. Pt appears notably less paranoid and hostile as she has been at times, but continues to believe people are moving her things around or taking personal belongings. Pt is better related with peers and often cooperative and proactively helpful within group sessions. Pt demonstrates low insight around her memory impairment and current limitations, endorsing hopes to go back to school and start a new career. Pt demonstrates brighter mood and a positive outlook for her future. Pt completed a safety plan and received a copy to take with her.

## 2023-01-17 NOTE — BH INPATIENT PSYCHIATRY PROGRESS NOTE - NSTXPSYCHODATETRGT_PSY_ALL_CORE
13-Dec-2022
11-Jan-2023
28-Dec-2022
08-Dec-2022
28-Dec-2022
04-Jan-2023
20-Dec-2022
17-Dec-2022
18-Jan-2023
28-Dec-2022
04-Jan-2023
04-Jan-2023
08-Dec-2022
18-Dec-2022
11-Jan-2023
13-Dec-2022
13-Dec-2022
04-Jan-2023
17-Dec-2022
11-Jan-2023
28-Dec-2022
08-Dec-2022
18-Dec-2022
08-Dec-2022
13-Dec-2022
17-Dec-2022
11-Jan-2023
28-Dec-2022
11-Jan-2023
18-Dec-2022
18-Dec-2022

## 2023-01-17 NOTE — BH INPATIENT PSYCHIATRY PROGRESS NOTE - NSBHMSESPEECH_PSY_A_CORE
Normal volume, rate, productivity, spontaneity and articulation

## 2023-01-17 NOTE — BH INPATIENT PSYCHIATRY PROGRESS NOTE - NSTXOTHERDATEEST_PSY_ALL_CORE
08-Dec-2022
22-Dec-2022
08-Dec-2022
08-Dec-2022
22-Dec-2022
08-Dec-2022
08-Dec-2022
22-Dec-2022
22-Dec-2022
08-Dec-2022
22-Dec-2022
08-Dec-2022
22-Dec-2022
08-Dec-2022
22-Dec-2022
22-Dec-2022
08-Dec-2022
08-Dec-2022

## 2023-01-17 NOTE — BH INPATIENT PSYCHIATRY PROGRESS NOTE - NSBHATTESTAPPBILLTIME_PSY_A_CORE
I attest my time as JEREMY is greater than 50% of the total combined time spent on qualifying patient care activities. I have reviewed and verified the documentation.
I attest my time as JEREYM is greater than 50% of the total combined time spent on qualifying patient care activities. I have reviewed and verified the documentation.
I attest my time as JEREMY is greater than 50% of the total combined time spent on qualifying patient care activities. I have reviewed and verified the documentation.

## 2023-01-17 NOTE — BH INPATIENT PSYCHIATRY PROGRESS NOTE - NSBHMSEATTEN_PSY_A_CORE
Normal
Impaired
Normal
Impaired
Impaired
Normal
Impaired
Normal
Impaired
Normal

## 2023-01-17 NOTE — BH INPATIENT PSYCHIATRY PROGRESS NOTE - NSTXDCHOUSGOAL_PSY_ALL_CORE
Other...
Will meet with care coordinator and accept services
Will meet with care coordinator and accept services
Other...
Will agree to participate in housing process
Will meet with care coordinator and accept services
Other...
Other...
Will meet with care coordinator and accept services
Other...
Will meet with care coordinator and accept services
Will meet with care coordinator and accept services
Other...
Other...
Will meet with care coordinator and accept services
Other...
Will meet with care coordinator and accept services
Other...
Will meet with care coordinator and accept services
Other...
Will meet with care coordinator and accept services
Other...
Will meet with care coordinator and accept services

## 2023-01-17 NOTE — BH INPATIENT PSYCHIATRY PROGRESS NOTE - NSICDXBHPRIMARYDX_PSY_ALL_CORE
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  

## 2023-01-17 NOTE — BH INPATIENT PSYCHIATRY PROGRESS NOTE - NSBHATTESTATTENDNAMEFT_PSY_A_CORE
Rylan Hankins

## 2023-01-17 NOTE — BH INPATIENT PSYCHIATRY PROGRESS NOTE - NSBHCHARTREVIEWVS_PSY_A_CORE FT
Vital Signs Last 24 Hrs  T(C): 36.6 (01-17-23 @ 10:27), Max: 36.6 (01-17-23 @ 10:27)  T(F): 97.9 (01-17-23 @ 10:27), Max: 97.9 (01-17-23 @ 10:27)  HR: 68 (01-17-23 @ 10:27) (68 - 70)  BP: 120/71 (01-17-23 @ 10:27) (120/71 - 127/72)  BP(mean): --  RR: 18 (01-17-23 @ 10:27) (17 - 18)  SpO2: 99% (01-17-23 @ 10:27) (98% - 99%)

## 2023-01-17 NOTE — BH INPATIENT PSYCHIATRY PROGRESS NOTE - NSBHATTESTBILLING_PSY_A_CORE
71042-Cexvhxrglr OBS or IP - low complexity OR 25-34 mins
72349-Zxaxchkcjz OBS or IP - moderate complexity OR 35-49 mins
58506-Tnjyyjxtuc OBS or IP - low complexity OR 25-34 mins
62173-Qouqwvkgdj OBS or IP - low complexity OR 25-34 mins
93387-Yusbhcqyoo OBS or IP - low complexity OR 25-34 mins
49272-Ihzrtnfwqa OBS or IP - moderate complexity OR 35-49 mins
74744-Wedrzgiegs OBS or IP - moderate complexity OR 35-49 mins
89315-Oktafinzju OBS or IP - low complexity OR 25-34 mins
43444-Iwkqcrpetm OBS or IP - low complexity OR 25-34 mins

## 2023-01-17 NOTE — BH INPATIENT PSYCHIATRY PROGRESS NOTE - NSBHMETABOLIC_PSY_ALL_CORE_FT
BMI: BMI (kg/m2): 21.8 (11-30-22 @ 03:08)  HbA1c: A1C with Estimated Average Glucose Result: 5.4 % (12-01-22 @ 07:21)    Glucose:   BP: 120/71 (01-17-23 @ 10:27) (120/71 - 127/74)  Lipid Panel: Date/Time: 12-01-22 @ 07:21  Cholesterol, Serum: 168  Direct LDL: --  HDL Cholesterol, Serum: 89  Total Cholesterol/HDL Ration Measurement: --  Triglycerides, Serum: 29

## 2023-01-17 NOTE — BH INPATIENT PSYCHIATRY PROGRESS NOTE - NSTXPROBDISORG_PSY_ALL_CORE
DISORGANIZATION OF THOUGHT/BEHAVIOR

## 2023-01-17 NOTE — BH DISCHARGE NOTE NURSING/SOCIAL WORK/PSYCH REHAB - NSDCADDINFO1FT_PSY_ALL_CORE
You are scheduled to attend an IN-PERSON intake mental health appointment at [facility] on TUESDAY, JANUARY 24, 2023 at 9AM. Please bring your ID and health insurance card.

## 2023-01-17 NOTE — BH INPATIENT PSYCHIATRY PROGRESS NOTE - NSTXPSYCHOINTERMD_PSY_ALL_CORE
Psychopharmacology x15 minutes, Risperdal titration continuing, TOO, will start invega sustenna
Psychopharmacology x15 minutes, Risperdal initiated, will titrate as appropriate, will consider TOO if pt continues to refuse
Psychopharmacology x15 minutes, Risperdal titration continuing, TOO with the goal of MALLOY will be completed. 
Psychopharmacology x15 minutes, Risperdal titration continuing, TOO, will start invega sustenna
Psychopharmacology x15 minutes, Risperdal initiated, will titrate as appropriate,TOO with the goal of MALLOY will be completed. 
Psychopharmacology x15 minutes, Risperdal initiated, will titrate as appropriate, will consider TOO if pt continues to refuse
Psychopharmacology x15 minutes, Risperdal titration continuing, TOO with the goal of MALLOY will be completed. 
Psychopharmacology x15 minutes, Risperdal titration continuing, TOO with the goal of MALLOY will be completed. 
Psychopharmacology x15 minutes, Risperdal initiated, will titrate as appropriate, will consider TOO if pt continues to refuse
Psychopharmacology x15 minutes, Risperdal initiated, will titrate as appropriate once patient starts to take it, TOO with the goal of MALLOY will be completed. 
Psychopharmacology x15 minutes, Risperdal initiated, will titrate as appropriate, will consider TOO if pt continues to refuse
Psychopharmacology x15 minutes, Risperdal initiated, will titrate as appropriate 
Psychopharmacology x15 minutes, Risperdal initiated, will titrate as appropriate, will consider TOO if pt continues to refuse
Psychopharmacology x15 minutes, Risperdal titration continuing, TOO, will start invega sustenna
Psychopharmacology x15 minutes, Risperdal initiated, will titrate as appropriate, will consider TOO if pt continues to refuse
Psychopharmacology x15 minutes, Risperdal initiated, will titrate as appropriate, will consider TOO if pt continues to refuse
Psychopharmacology x15 minutes, Risperdal initiated, will titrate as appropriate 
Psychopharmacology x15 minutes, Risperdal initiated, will titrate as appropriate 
Psychopharmacology x15 minutes, Risperdal initiated, will titrate as appropriate once patient starts to take it, TOO with the goal of MALLOY will be completed. 
Psychopharmacology x15 minutes, Risperdal initiated, will titrate as appropriate,TOO with the goal of MALLOY will be completed. 
Psychopharmacology x15 minutes, Risperdal titration continuing, TOO, will start invega sustenna
Psychopharmacology x15 minutes, Risperdal initiated, will titrate as appropriate once patient starts to take it, TOO with the goal of MALLOY will be completed. 
Psychopharmacology x15 minutes, Risperdal initiated, will titrate as appropriate,TOO with the goal of MALLOY will be completed. 
Psychopharmacology x15 minutes, Risperdal titration continuing, TOO, will start invega sustenna
Psychopharmacology x15 minutes, Risperdal initiated, will titrate as appropriate once patient starts to take it, TOO with the goal of MALLOY will be completed. 
Psychopharmacology x15 minutes, Risperdal initiated, will titrate as appropriate 
Psychopharmacology x15 minutes, Risperdal initiated, will titrate as appropriate,TOO with the goal of MALLOY will be completed. 
Psychopharmacology x15 minutes, Risperdal titration continuing, TOO, will start invega sustenna
Psychopharmacology x15 minutes, Risperdal titration continuing, TOO with the goal of MALLOY will be completed. 
Psychopharmacology x15 minutes, Risperdal titration continuing, TOO, will start invega sustenna
Psychopharmacology x15 minutes, Risperdal initiated, will titrate as appropriate, will consider TOO if pt continues to refuse
Psychopharmacology x15 minutes, Risperdal titration continuing, TOO with the goal of MALLOY will be completed. 
Psychopharmacology x15 minutes, Risperdal titration continuing, TOO, will start invega sustenna

## 2023-01-17 NOTE — BH INPATIENT PSYCHIATRY PROGRESS NOTE - NSTXDCHOUSDATETRGT_PSY_ALL_CORE
15-Dec-2022
29-Dec-2022
15-Roberto Carlos-2023
29-Dec-2022
29-Dec-2022
15-Dec-2022
22-Dec-2022
15-Roberto Carlos-2023
29-Dec-2022
15-Roberto Carlos-2023
29-Dec-2022
15-Dec-2022
15-Roberto Carlos-2023
20-Dec-2022
15-Roberto Carlos-2023
15-Dec-2022
29-Dec-2022
15-Dec-2022
20-Dec-2022
22-Dec-2022
22-Dec-2022
29-Dec-2022
22-Dec-2022
29-Dec-2022
20-Dec-2022
22-Dec-2022
15-Roberto Carlos-2023

## 2023-01-17 NOTE — BH INPATIENT PSYCHIATRY PROGRESS NOTE - NSTXDISORGDATETRGT_PSY_ALL_CORE
11-Jan-2023
08-Dec-2022
13-Dec-2022
17-Dec-2022
28-Dec-2022
04-Jan-2023
11-Jan-2023
17-Dec-2022
28-Dec-2022
14-Dec-2022
11-Jan-2023
13-Dec-2022
18-Jan-2023
08-Dec-2022
04-Jan-2023
08-Dec-2022
14-Dec-2022
17-Dec-2022
18-Jan-2023
17-Dec-2022
28-Dec-2022
04-Jan-2023
04-Jan-2023
28-Dec-2022
11-Jan-2023
08-Dec-2022
11-Jan-2023
21-Dec-2022
28-Dec-2022
18-Jan-2023
18-Jan-2023

## 2023-01-17 NOTE — BH INPATIENT PSYCHIATRY PROGRESS NOTE - NSTXPROBPSYCHO_PSY_ALL_CORE
PSYCHOTIC SYMPTOMS

## 2023-01-17 NOTE — BH INPATIENT PSYCHIATRY PROGRESS NOTE - NSTXDISORGGOAL_PSY_ALL_CORE
Will identify 2 coping skills that assist in organizing
Will make at least 3 goal and reality oriented statements during therapy
Will identify 2 coping skills that assist in organizing
Will make at least 3 goal and reality oriented statements during therapy
Will identify 2 coping skills that assist in organizing
Will make at least 3 goal and reality oriented statements during therapy
Will identify 2 coping skills that assist in organizing
Will make at least 3 goal and reality oriented statements during therapy
Other...
Will make at least 3 goal and reality oriented statements during therapy
Will make at least 3 goal and reality oriented statements during therapy
Other...
Will make at least 3 goal and reality oriented statements during therapy
Other...
Will make at least 3 goal and reality oriented statements during therapy
Will identify 2 coping skills that assist in organizing
Will make at least 3 goal and reality oriented statements during therapy
Will make at least 3 goal and reality oriented statements during therapy
Will identify 2 coping skills that assist in organizing
Will identify 2 coping skills that assist in organizing
Will make at least 3 goal and reality oriented statements during therapy
Will identify 2 coping skills that assist in organizing
Will make at least 3 goal and reality oriented statements during therapy
Will make at least 3 goal and reality oriented statements during therapy

## 2023-01-17 NOTE — BH INPATIENT PSYCHIATRY PROGRESS NOTE - NSTXDCHOUSINTERMD_PSY_ALL_CORE
will look into SNF, 

## 2023-01-17 NOTE — BH INPATIENT PSYCHIATRY PROGRESS NOTE - NSBHROSSYSTEMS_PSY_ALL_CORE
Psychiatric

## 2023-01-17 NOTE — BH INPATIENT PSYCHIATRY PROGRESS NOTE - NSTXOTHERDATETRGT_PSY_ALL_CORE
28-Dec-2022
20-Dec-2022
15-Dec-2022
22-Dec-2022
28-Dec-2022
22-Dec-2022
29-Dec-2022
22-Dec-2022
22-Dec-2022
17-Jan-2023
29-Dec-2022
17-Jan-2023
28-Dec-2022
20-Dec-2022
15-Dec-2022
17-Jan-2023
15-Dec-2022
17-Jan-2023
29-Dec-2022
22-Dec-2022
29-Dec-2022
17-Jan-2023
17-Jan-2023
20-Dec-2022
22-Dec-2022

## 2023-01-17 NOTE — BH INPATIENT PSYCHIATRY PROGRESS NOTE - NSBHMSEBODY_PSY_A_CORE
Underweight
Underweight
Average build
Underweight
Average build
Underweight
Average build
Underweight
Average build
Underweight
Average build
Underweight
Average build
Average build
Underweight
Average build
Underweight
Average build
Underweight
Underweight
Average build

## 2023-01-17 NOTE — BH INPATIENT PSYCHIATRY PROGRESS NOTE - NSBHMSEPERCEPT_PSY_A_CORE
No abnormalities
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
## 2023-01-17 NOTE — BH INPATIENT PSYCHIATRY PROGRESS NOTE - NSBHMSETHTCONTENT_PSY_A_CORE
Delusions/Preoccupations/Other
Delusions/Preoccupations
Preoccupations
Delusions/Preoccupations/Other
Delusions/Preoccupations
Delusions/Preoccupations
Delusions/Preoccupations/Other
Preoccupations/Other
Delusions/Preoccupations/Other
Delusions/Preoccupations/Other
Preoccupations/Other
Delusions/Preoccupations
Delusions/Preoccupations
Preoccupations/Other
Preoccupations/Other
Delusions/Preoccupations
Delusions/Preoccupations/Other
Unremarkable
Preoccupations
Delusions/Preoccupations/Other
Preoccupations/Other
Delusions/Preoccupations
Preoccupations
Delusions/Preoccupations/Other
Preoccupations/Other
Delusions/Preoccupations/Other
Delusions/Preoccupations
Delusions/Preoccupations/Other
Delusions/Preoccupations/Other

## 2023-01-17 NOTE — BH INPATIENT PSYCHIATRY PROGRESS NOTE - NSTXOTHERGOAL_PSY_ALL_CORE
dementia

## 2023-01-17 NOTE — BH INPATIENT PSYCHIATRY PROGRESS NOTE - NSBHMSEBEHAV_PSY_A_CORE
Cooperative

## 2023-01-17 NOTE — BH INPATIENT PSYCHIATRY PROGRESS NOTE - NSTXDISORGPROGRES_PSY_ALL_CORE
Improving
No Change
Improving
No Change
Improving
No Change
Improving
No Change
Improving
No Change
Improving
No Change
No Change
Improving
Improving

## 2023-01-17 NOTE — BH INPATIENT PSYCHIATRY PROGRESS NOTE - NSTREATMENTCERTY_PSY_ALL_CORE

## 2023-01-17 NOTE — BH INPATIENT PSYCHIATRY PROGRESS NOTE - NSBHMSEGAIT_PSY_A_CORE
Normal gait / station

## 2023-01-17 NOTE — BH INPATIENT PSYCHIATRY PROGRESS NOTE - NSTXOTHERINTERMD_PSY_ALL_CORE
Psychopharmacology x15 minutes, Risperdal initiated, will titrate as appropriate, TOO may be considered it pt continues to refuse.  PT and OT consults
Psychopharmacology x15 minutes, Risperdal titration continuing, TOO with the goal of MALLOY will be completed.   PT and OT consult
Psychopharmacology x15 minutes, Risperdal titration continuing, TOO with the goal of MALLOY will be completed.   PT and OT consult
Psychopharmacology x15 minutes, Risperdal initiated, will titrate as appropriate, TOO with MALLOY the goal will be completed.   PT and OT consult
Psychopharmacology x15 minutes, Risperdal titration continuing, TOO with the goal of MALLOY will be completed.   PT and OT consult
Psychopharmacology x15 minutes, Risperdal titration continuing, TOO with the goal of MALLOY will be completed.   PT and OT consult
Psychopharmacology x15 minutes, Risperdal initiated, will titrate as appropriate once patient starts to take it, TOO with the goal of MALLOY will be completed.   PT and OT consult
Psychopharmacology x15 minutes, Risperdal initiated, will titrate as appropriate, TOO with MALLOY the goal will be completed.   PT and OT consult
Psychopharmacology x15 minutes, Risperdal titration continuing, TOO with the goal of MALLOY will be completed.   PT and OT consult
Psychopharmacology x15 minutes, Risperdal initiated, will titrate as appropriate, TOO may be considered it pt continues to refuse.  PT and OT consults
Psychopharmacology x15 minutes, Risperdal initiated, will titrate as appropriate, TOO with MALLOY the goal will be completed.   PT and OT consult
Psychopharmacology x15 minutes, Risperdal titration continuing, TOO with the goal of MALLOY will be completed.   PT and OT consult
Psychopharmacology x15 minutes, Risperdal initiated, will titrate as appropriate once patient starts to take it, TOO with the goal of MALLOY will be completed.   PT and OT consult
Psychopharmacology x15 minutes, Risperdal initiated, will titrate as appropriate, TOO with MALLOY the goal will be completed.   PT and OT consult
Psychopharmacology x15 minutes, Risperdal titration continuing, TOO with the goal of MALLOY will be completed.   PT and OT consult
Psychopharmacology x15 minutes, Risperdal titration continuing, TOO with the goal of MALLOY will be completed.   PT and OT consult
Psychopharmacology x15 minutes, Risperdal initiated, will titrate as appropriate, TOO may be considered it pt continues to refuse.  PT and OT consults
Psychopharmacology x15 minutes, Risperdal titration continuing, TOO with the goal of MALLOY will be completed.   PT and OT consult
Psychopharmacology x15 minutes, Risperdal initiated, will titrate as appropriate once patient starts to take it, TOO with the goal of MALLOY will be completed.   PT and OT consult
Psychopharmacology x15 minutes, Risperdal titration continuing, TOO with the goal of MALLOY will be completed.   PT and OT consult
Psychopharmacology x15 minutes, Risperdal initiated, will titrate as appropriate, TOO may be considered it pt continues to refuse.  PT and OT consults
Psychopharmacology x15 minutes, Risperdal initiated, will titrate as appropriate once patient starts to take it, TOO with the goal of MALLOY will be completed.   PT and OT consult
Psychopharmacology x15 minutes, Risperdal titration continuing, TOO with the goal of MALLOY will be completed.   PT and OT consult

## 2023-01-18 NOTE — BH SOCIAL WORK CONFIRMATION FOLLOW UP NOTE - NSCOMMENTS_PSY_ALL_CORE
Caring Call: Pt listed as "Low" suicide risk; thus, caring call not required.  Caring Call: Pt listed as "Low" suicide risk; thus, caring call not required.   Linkage Call: This SW tried x3 to call [facility] on 1/25/23 to confirm if pt attended her appt on 1/24/23 @ 9AM; however, the line was busy each time.  Caring Call: Pt listed as "Low" suicide risk; thus, caring call not required.   Linkage Call: This SW tried x3 to call [facility] on 1/25/23 to confirm if pt attended her appt on 1/24/23 @ 9AM; however, the line was busy each time. This SW called the clinic again on 1/27/23, but the line was busy again.

## 2023-01-20 DIAGNOSIS — Z53.20 PROCEDURE AND TREATMENT NOT CARRIED OUT BECAUSE OF PATIENT'S DECISION FOR UNSPECIFIED REASONS: ICD-10-CM

## 2023-01-20 DIAGNOSIS — F03.92 UNSPECIFIED DEMENTIA, UNSPECIFIED SEVERITY, WITH PSYCHOTIC DISTURBANCE: ICD-10-CM

## 2023-01-20 DIAGNOSIS — F22 DELUSIONAL DISORDERS: ICD-10-CM

## 2023-01-20 DIAGNOSIS — Z91.14 PATIENT'S OTHER NONCOMPLIANCE WITH MEDICATION REGIMEN: ICD-10-CM

## 2023-02-07 PROCEDURE — 80307 DRUG TEST PRSMV CHEM ANLYZR: CPT

## 2023-02-07 PROCEDURE — 84100 ASSAY OF PHOSPHORUS: CPT

## 2023-02-07 PROCEDURE — 87389 HIV-1 AG W/HIV-1&-2 AB AG IA: CPT

## 2023-02-07 PROCEDURE — 82746 ASSAY OF FOLIC ACID SERUM: CPT

## 2023-02-07 PROCEDURE — 70450 CT HEAD/BRAIN W/O DYE: CPT

## 2023-02-07 PROCEDURE — 71046 X-RAY EXAM CHEST 2 VIEWS: CPT

## 2023-02-07 PROCEDURE — 84484 ASSAY OF TROPONIN QUANT: CPT

## 2023-02-07 PROCEDURE — 83735 ASSAY OF MAGNESIUM: CPT

## 2023-02-07 PROCEDURE — 80053 COMPREHEN METABOLIC PANEL: CPT

## 2023-02-07 PROCEDURE — 99285 EMERGENCY DEPT VISIT HI MDM: CPT | Mod: 25

## 2023-02-07 PROCEDURE — 80048 BASIC METABOLIC PNL TOTAL CA: CPT

## 2023-02-07 PROCEDURE — 84443 ASSAY THYROID STIM HORMONE: CPT

## 2023-02-07 PROCEDURE — 80061 LIPID PANEL: CPT

## 2023-02-07 PROCEDURE — 93005 ELECTROCARDIOGRAM TRACING: CPT

## 2023-02-07 PROCEDURE — 83036 HEMOGLOBIN GLYCOSYLATED A1C: CPT

## 2023-02-07 PROCEDURE — U0003: CPT

## 2023-02-07 PROCEDURE — 87635 SARS-COV-2 COVID-19 AMP PRB: CPT

## 2023-02-07 PROCEDURE — 36415 COLL VENOUS BLD VENIPUNCTURE: CPT

## 2023-02-07 PROCEDURE — 81001 URINALYSIS AUTO W/SCOPE: CPT

## 2023-02-07 PROCEDURE — 85027 COMPLETE CBC AUTOMATED: CPT

## 2023-02-07 PROCEDURE — 81003 URINALYSIS AUTO W/O SCOPE: CPT

## 2023-02-07 PROCEDURE — U0005: CPT

## 2023-02-07 PROCEDURE — 85025 COMPLETE CBC W/AUTO DIFF WBC: CPT

## 2023-02-07 PROCEDURE — 83880 ASSAY OF NATRIURETIC PEPTIDE: CPT

## 2023-02-07 PROCEDURE — 97161 PT EVAL LOW COMPLEX 20 MIN: CPT

## 2023-11-16 NOTE — BH PATIENT PROFILE - NSPROMEDSPATCH_GEN_A_NUR
none
General Sunscreen Counseling: I recommended a broad spectrum sunscreen with a SPF of 30 or higher. I explained that SPF 30 sunscreens block approximately 97 percent of the sun's harmful rays. Sunscreens should be applied at least 15 minutes prior to expected sun exposure and then every 2 hours after that as long as sun exposure continues. If swimming or exercising sunscreen should be reapplied every 45 minutes to an hour after getting wet or sweating. One ounce, or the equivalent of a shot glass full of sunscreen, is adequate to protect the skin not covered by a bathing suit. I also recommended a lip balm with a sunscreen as well. Sun protective clothing can be used in lieu of sunscreen but must be worn the entire time you are exposed to the sun's rays.
Detail Level: Detailed

## 2025-05-06 NOTE — BH CONSULTATION LIAISON ASSESSMENT NOTE - NSBHLEGALSTATUS_PSY_ALL_CORE
Medication:     olmesartan (BENICAR) 40 MG tablet    passed protocol.   Last office visit date: 0312/25  Next appointment scheduled?: No; Outreach performed, left message for patient to call back.    Number of refills given: 3    9.27 (2PC)